# Patient Record
Sex: MALE | Race: WHITE | NOT HISPANIC OR LATINO | Employment: OTHER | URBAN - METROPOLITAN AREA
[De-identification: names, ages, dates, MRNs, and addresses within clinical notes are randomized per-mention and may not be internally consistent; named-entity substitution may affect disease eponyms.]

---

## 2020-10-02 ENCOUNTER — HOSPITAL PATHOLOGY (OUTPATIENT)
Dept: OTHER | Facility: CLINIC | Age: 80
End: 2020-10-02

## 2020-10-06 LAB — COPATH REPORT: NORMAL

## 2020-10-23 DIAGNOSIS — Z11.59 ENCOUNTER FOR SCREENING FOR OTHER VIRAL DISEASES: Primary | ICD-10-CM

## 2020-10-31 DIAGNOSIS — Z11.59 ENCOUNTER FOR SCREENING FOR OTHER VIRAL DISEASES: ICD-10-CM

## 2020-10-31 LAB
SARS-COV-2 RNA SPEC QL NAA+PROBE: NORMAL
SPECIMEN SOURCE: NORMAL

## 2020-10-31 PROCEDURE — U0003 INFECTIOUS AGENT DETECTION BY NUCLEIC ACID (DNA OR RNA); SEVERE ACUTE RESPIRATORY SYNDROME CORONAVIRUS 2 (SARS-COV-2) (CORONAVIRUS DISEASE [COVID-19]), AMPLIFIED PROBE TECHNIQUE, MAKING USE OF HIGH THROUGHPUT TECHNOLOGIES AS DESCRIBED BY CMS-2020-01-R: HCPCS | Performed by: OPHTHALMOLOGY

## 2020-11-01 LAB
LABORATORY COMMENT REPORT: NORMAL
SARS-COV-2 RNA SPEC QL NAA+PROBE: NEGATIVE
SPECIMEN SOURCE: NORMAL

## 2020-11-03 RX ORDER — LORATADINE 10 MG/1
10 TABLET ORAL DAILY PRN
COMMUNITY
End: 2022-07-08

## 2020-11-03 RX ORDER — LEVOTHYROXINE SODIUM 100 UG/1
100 TABLET ORAL DAILY
COMMUNITY

## 2020-11-03 RX ORDER — ALBUTEROL SULFATE 0.83 MG/ML
2.5 SOLUTION RESPIRATORY (INHALATION) EVERY 4 HOURS PRN
COMMUNITY
End: 2022-07-08

## 2020-11-03 RX ORDER — KETOCONAZOLE 10 MG/ML
SHAMPOO TOPICAL PRN
COMMUNITY

## 2020-11-03 RX ORDER — ASPIRIN 81 MG/1
81 TABLET, CHEWABLE ORAL DAILY
COMMUNITY
End: 2021-03-24

## 2020-11-03 RX ORDER — FEXOFENADINE HCL 180 MG/1
180 TABLET ORAL PRN
COMMUNITY
End: 2022-07-08

## 2020-11-03 RX ORDER — TRIAMCINOLONE ACETONIDE 1 MG/G
CREAM TOPICAL 2 TIMES DAILY
COMMUNITY
End: 2022-07-08

## 2020-11-03 RX ORDER — ALBUTEROL SULFATE 90 UG/1
2 AEROSOL, METERED RESPIRATORY (INHALATION) PRN
COMMUNITY

## 2020-11-03 RX ORDER — PANTOPRAZOLE SODIUM 40 MG/1
40 TABLET, DELAYED RELEASE ORAL DAILY
COMMUNITY
End: 2022-07-08

## 2020-11-04 ENCOUNTER — ANESTHESIA EVENT (OUTPATIENT)
Dept: SURGERY | Facility: CLINIC | Age: 80
End: 2020-11-04
Payer: MEDICARE

## 2020-11-04 ENCOUNTER — ANESTHESIA (OUTPATIENT)
Dept: SURGERY | Facility: CLINIC | Age: 80
End: 2020-11-04
Payer: MEDICARE

## 2020-11-04 ENCOUNTER — HOSPITAL ENCOUNTER (OUTPATIENT)
Facility: CLINIC | Age: 80
Discharge: HOME | End: 2020-11-04
Attending: OPHTHALMOLOGY | Admitting: OPHTHALMOLOGY
Payer: MEDICARE

## 2020-11-04 VITALS
HEART RATE: 56 BPM | TEMPERATURE: 98 F | DIASTOLIC BLOOD PRESSURE: 92 MMHG | RESPIRATION RATE: 16 BRPM | SYSTOLIC BLOOD PRESSURE: 135 MMHG | HEIGHT: 71 IN | BODY MASS INDEX: 23.49 KG/M2 | WEIGHT: 167.8 LBS | OXYGEN SATURATION: 95 %

## 2020-11-04 DIAGNOSIS — H02.9 LESION OF LEFT EYELID: Primary | ICD-10-CM

## 2020-11-04 PROCEDURE — 250N000011 HC RX IP 250 OP 636: Performed by: NURSE ANESTHETIST, CERTIFIED REGISTERED

## 2020-11-04 PROCEDURE — 360N000021 HC SURGERY LEVEL 3 EA 15 ADDTL MIN: Performed by: OPHTHALMOLOGY

## 2020-11-04 PROCEDURE — 88331 PATH CONSLTJ SURG 1 BLK 1SPC: CPT | Mod: 26 | Performed by: PATHOLOGY

## 2020-11-04 PROCEDURE — 258N000003 HC RX IP 258 OP 636: Performed by: NURSE ANESTHETIST, CERTIFIED REGISTERED

## 2020-11-04 PROCEDURE — 272N000001 HC OR GENERAL SUPPLY STERILE: Performed by: OPHTHALMOLOGY

## 2020-11-04 PROCEDURE — 360N000020 HC SURGERY LEVEL 3 1ST 30 MIN: Performed by: OPHTHALMOLOGY

## 2020-11-04 PROCEDURE — 999N000139 HC STATISTIC PRE-PROCEDURE ASSESSMENT II: Performed by: OPHTHALMOLOGY

## 2020-11-04 PROCEDURE — 250N000009 HC RX 250: Performed by: OPHTHALMOLOGY

## 2020-11-04 PROCEDURE — 88331 PATH CONSLTJ SURG 1 BLK 1SPC: CPT | Mod: TC | Performed by: OPHTHALMOLOGY

## 2020-11-04 PROCEDURE — 370N000001 HC ANESTHESIA TECHNICAL FEE, 1ST 30 MIN: Performed by: OPHTHALMOLOGY

## 2020-11-04 PROCEDURE — 761N000007 HC RECOVERY PHASE 2 EACH 15 MINS: Performed by: OPHTHALMOLOGY

## 2020-11-04 PROCEDURE — 761N000001 HC RECOVERY PHASE 1 LEVEL 1 FIRST HR: Performed by: OPHTHALMOLOGY

## 2020-11-04 PROCEDURE — 370N000002 HC ANESTHESIA TECHNICAL FEE, EACH ADDTL 15 MIN: Performed by: OPHTHALMOLOGY

## 2020-11-04 PROCEDURE — 88305 TISSUE EXAM BY PATHOLOGIST: CPT | Mod: TC | Performed by: OPHTHALMOLOGY

## 2020-11-04 PROCEDURE — 88305 TISSUE EXAM BY PATHOLOGIST: CPT | Mod: 26 | Performed by: PATHOLOGY

## 2020-11-04 RX ORDER — ONDANSETRON 2 MG/ML
4 INJECTION INTRAMUSCULAR; INTRAVENOUS EVERY 30 MIN PRN
Status: DISCONTINUED | OUTPATIENT
Start: 2020-11-04 | End: 2020-11-04 | Stop reason: HOSPADM

## 2020-11-04 RX ORDER — ERYTHROMYCIN 5 MG/G
OINTMENT OPHTHALMIC PRN
Status: DISCONTINUED | OUTPATIENT
Start: 2020-11-04 | End: 2020-11-04 | Stop reason: HOSPADM

## 2020-11-04 RX ORDER — HYDRALAZINE HYDROCHLORIDE 20 MG/ML
2.5-5 INJECTION INTRAMUSCULAR; INTRAVENOUS EVERY 10 MIN PRN
Status: DISCONTINUED | OUTPATIENT
Start: 2020-11-04 | End: 2020-11-04 | Stop reason: HOSPADM

## 2020-11-04 RX ORDER — DEXAMETHASONE SODIUM PHOSPHATE 4 MG/ML
4 INJECTION, SOLUTION INTRA-ARTICULAR; INTRALESIONAL; INTRAMUSCULAR; INTRAVENOUS; SOFT TISSUE EVERY 10 MIN PRN
Status: DISCONTINUED | OUTPATIENT
Start: 2020-11-04 | End: 2020-11-04 | Stop reason: HOSPADM

## 2020-11-04 RX ORDER — SODIUM CHLORIDE, SODIUM LACTATE, POTASSIUM CHLORIDE, CALCIUM CHLORIDE 600; 310; 30; 20 MG/100ML; MG/100ML; MG/100ML; MG/100ML
INJECTION, SOLUTION INTRAVENOUS CONTINUOUS PRN
Status: DISCONTINUED | OUTPATIENT
Start: 2020-11-04 | End: 2020-11-04

## 2020-11-04 RX ORDER — MEPERIDINE HYDROCHLORIDE 25 MG/ML
12.5 INJECTION INTRAMUSCULAR; INTRAVENOUS; SUBCUTANEOUS
Status: DISCONTINUED | OUTPATIENT
Start: 2020-11-04 | End: 2020-11-04 | Stop reason: HOSPADM

## 2020-11-04 RX ORDER — PROPOFOL 10 MG/ML
INJECTION, EMULSION INTRAVENOUS PRN
Status: DISCONTINUED | OUTPATIENT
Start: 2020-11-04 | End: 2020-11-04

## 2020-11-04 RX ORDER — FENTANYL CITRATE 0.05 MG/ML
25-50 INJECTION, SOLUTION INTRAMUSCULAR; INTRAVENOUS
Status: DISCONTINUED | OUTPATIENT
Start: 2020-11-04 | End: 2020-11-04 | Stop reason: HOSPADM

## 2020-11-04 RX ORDER — ONDANSETRON 4 MG/1
4 TABLET, ORALLY DISINTEGRATING ORAL EVERY 30 MIN PRN
Status: DISCONTINUED | OUTPATIENT
Start: 2020-11-04 | End: 2020-11-04 | Stop reason: HOSPADM

## 2020-11-04 RX ORDER — HYDROCODONE BITARTRATE AND ACETAMINOPHEN 5; 325 MG/1; MG/1
1 TABLET ORAL EVERY 6 HOURS PRN
Qty: 10 TABLET | Refills: 0 | Status: SHIPPED | OUTPATIENT
Start: 2020-11-04 | End: 2020-11-07

## 2020-11-04 RX ORDER — ERYTHROMYCIN 5 MG/G
OINTMENT OPHTHALMIC 3 TIMES DAILY
Qty: 2 TUBE | Refills: 1 | Status: SHIPPED | OUTPATIENT
Start: 2020-11-04 | End: 2020-11-04

## 2020-11-04 RX ORDER — SODIUM CHLORIDE, SODIUM LACTATE, POTASSIUM CHLORIDE, CALCIUM CHLORIDE 600; 310; 30; 20 MG/100ML; MG/100ML; MG/100ML; MG/100ML
INJECTION, SOLUTION INTRAVENOUS CONTINUOUS
Status: DISCONTINUED | OUTPATIENT
Start: 2020-11-04 | End: 2020-11-04 | Stop reason: HOSPADM

## 2020-11-04 RX ORDER — ONDANSETRON 2 MG/ML
INJECTION INTRAMUSCULAR; INTRAVENOUS PRN
Status: DISCONTINUED | OUTPATIENT
Start: 2020-11-04 | End: 2020-11-04

## 2020-11-04 RX ORDER — NALOXONE HYDROCHLORIDE 0.4 MG/ML
.1-.4 INJECTION, SOLUTION INTRAMUSCULAR; INTRAVENOUS; SUBCUTANEOUS
Status: DISCONTINUED | OUTPATIENT
Start: 2020-11-04 | End: 2020-11-04 | Stop reason: HOSPADM

## 2020-11-04 RX ORDER — ERYTHROMYCIN 5 MG/G
OINTMENT OPHTHALMIC 3 TIMES DAILY
Qty: 2 TUBE | Refills: 1 | Status: SHIPPED | OUTPATIENT
Start: 2020-11-04 | End: 2022-07-08

## 2020-11-04 RX ORDER — TETRACAINE HYDROCHLORIDE 5 MG/ML
SOLUTION OPHTHALMIC PRN
Status: DISCONTINUED | OUTPATIENT
Start: 2020-11-04 | End: 2020-11-04 | Stop reason: HOSPADM

## 2020-11-04 RX ORDER — LABETALOL HYDROCHLORIDE 5 MG/ML
10 INJECTION, SOLUTION INTRAVENOUS
Status: DISCONTINUED | OUTPATIENT
Start: 2020-11-04 | End: 2020-11-04 | Stop reason: HOSPADM

## 2020-11-04 RX ORDER — HYDROMORPHONE HYDROCHLORIDE 1 MG/ML
.3-.5 INJECTION, SOLUTION INTRAMUSCULAR; INTRAVENOUS; SUBCUTANEOUS EVERY 10 MIN PRN
Status: DISCONTINUED | OUTPATIENT
Start: 2020-11-04 | End: 2020-11-04 | Stop reason: HOSPADM

## 2020-11-04 RX ADMIN — PROPOFOL 20 MG: 10 INJECTION, EMULSION INTRAVENOUS at 14:51

## 2020-11-04 RX ADMIN — SODIUM CHLORIDE, POTASSIUM CHLORIDE, SODIUM LACTATE AND CALCIUM CHLORIDE: 600; 310; 30; 20 INJECTION, SOLUTION INTRAVENOUS at 14:23

## 2020-11-04 RX ADMIN — ONDANSETRON 4 MG: 2 INJECTION INTRAMUSCULAR; INTRAVENOUS at 14:46

## 2020-11-04 RX ADMIN — PROPOFOL 50 MG: 10 INJECTION, EMULSION INTRAVENOUS at 14:28

## 2020-11-04 RX ADMIN — PROPOFOL 20 MG: 10 INJECTION, EMULSION INTRAVENOUS at 14:29

## 2020-11-04 ASSESSMENT — COPD QUESTIONNAIRES: COPD: 1

## 2020-11-04 ASSESSMENT — MIFFLIN-ST. JEOR: SCORE: 1493.27

## 2020-11-04 ASSESSMENT — LIFESTYLE VARIABLES: TOBACCO_USE: 1

## 2020-11-04 NOTE — ANESTHESIA CARE TRANSFER NOTE
Patient: Ramu Quigley    Procedure(s):  LEFT LOWER LID WEDGE EXCISION WITH FROZEN SECTION CONTROL    Diagnosis: Neoplasm of uncertain behavior of skin [D48.5]  Diagnosis Additional Information: No value filed.    Anesthesia Type:   MAC     Note:  Airway :Room Air  Patient transferred to:PACU  Handoff Report: Identifed the Patient, Identified the Reponsible Provider, Reviewed the pertinent medical history, Discussed the surgical course, Reviewed Intra-OP anesthesia mangement and issues during anesthesia, Set expectations for post-procedure period and Allowed opportunity for questions and acknowledgement of understanding      Vitals: (Last set prior to Anesthesia Care Transfer)    CRNA VITALS  11/4/2020 1430 - 11/4/2020 1509      11/4/2020             Resp Rate (set):  10    EKG:  Sinus rhythm                Electronically Signed By: BRANDY Altman CRNA  November 4, 2020  3:09 PM

## 2020-11-04 NOTE — DISCHARGE INSTRUCTIONS
Same Day Surgery Discharge Instructions for  Sedation and General Anesthesia       It's not unusual to feel dizzy, light-headed or faint for up to 24 hours after surgery or while taking pain medication.  If you have these symptoms: sit for a few minutes before standing and have someone assist you when you get up to walk or use the bathroom.      You should rest and relax for the next 24 hours. We recommend you make arrangements to have an adult stay with you for at least 24 hours after your discharge.  Avoid hazardous and strenuous activity.      DO NOT DRIVE any vehicle or operate mechanical equipment for 24 hours following the end of your surgery.  Even though you may feel normal, your reactions may be affected by the medication you have received.      Do not drink alcoholic beverages for 24 hours following surgery.       Slowly progress to your regular diet as you feel able. It's not unusual to feel nauseated and/or vomit after receiving anesthesia.  If you develop these symptoms, drink clear liquids (apple juice, ginger ale, broth, 7-up, etc. ) until you feel better.  If your nausea and vomiting persists for 24 hours, please notify your surgeon.        All narcotic pain medications, along with inactivity and anesthesia, can cause constipation. Drinking plenty of liquids and increasing fiber intake will help.      For any questions of a medical nature, call your surgeon.      Do not make important decisions for 24 hours.      If you had general anesthesia, you may have a sore throat for a couple of days related to the breathing tube used during surgery.  You may use Cepacol lozenges to help with this discomfort.  If it worsens or if you develop a fever, contact your surgeon.       If you feel your pain is not well managed with the pain medications prescribed by your surgeon, please contact your surgeon's office to let them know so they can address your concerns.       CoVid 19 Information    We want to give you  information regarding Covid. Please consult your primary care provider with any questions you might have.     Patient who have symptoms (cough, fever, or shortness of breath), need to isolate for 7 days from when symptoms started OR 72 hours after fever resolves (without fever reducing medications) AND improvement of respiratory symptoms (whichever is longer).      Isolate yourself at home (in own room/own bathroom if possible)    Do Not allow any visitors    Do Not go to work or school    Do Not go to [employer],  centers, shopping, or other public places.    Do Not shake hands.    Avoid close and intimate contact with others (hugging, kissing).    Follow CDC recommendations for household cleaning of frequently touched services.     After the initial 7 days, continue to isolate yourself from household members as much as possible. To continue decrease the risk of community spread and exposure, you and any members of your household should limit activities in public for 14 days after starting home isolation.     You can reference the following CDC link for helpful home isolation/care tips:  https://www.cdc.gov/coronavirus/2019-ncov/downloads/10Things.pdf    Protect Others:    Cover Your Mouth and Nose with a mask, disposable tissue or wash cloth to avoid spreading germs to others.    Wash your hands and face frequently with soap and water    Call Your Primary Doctor If: Breathing difficulty develops or you become worse.    For more information about COVID19 and options for caring for yourself at home, please visit the CDC website at https://www.cdc.gov/coronavirus/2019-ncov/about/steps-when-sick.html  For more options for care at [facility], please visit our website at [email or web address]            [facility]   Eyelid/Orbital Surgery Discharge Instructions  Dr. Beulah Ba     ICE COMPRESSES  Immediately following surgery, you should begin to apply ice  compresses.  Apply a cold gel pack or wrap a clean washcloth around a cup of crushed ice in a plastic bag (a bag of frozen peas also works well) and hold the cold compresses directly against the closed eyelid (s).Apply cold pack for a minimum of six times daily for no longer than 15 minutes at a time. Continue cold compresses every day until the bruising and swelling begin to subside.  This can vary for each patient, but three days may be common.    HOT COMPRESSES  After your swelling and bruising have begun to subside, hot compresses should be applied.  Take a clean washcloth and wring it out in hot water (as warm as you can tolerate comfortably).  Hold this warm compress against the closed eyelid(s) at least six times per day for 15 minutes.  This should be continued for about two weeks.    OINTMENT  You may be given some ointment when you leave the hospital.  Apply this ointment as directed per pharmacy label for 7 days.  Expect some blurring of vision from the ointment.     ACTIVITY  Avoid heavy lifting or vigorous exercise for one week after surgery.  You may resume regular activities as tolerated.  You may shower and wash your hair on the day after surgery; be careful to avoid soaking the wounds or getting shampoo in your eyes. While your eyes are still swollen, it is recommended you sleep on your back and elevate your head with 2-3 pillows.    MEDICATION  If the doctor has given you some medications to take after surgery, please take these according to the instructions on the bottle.  Pain medications may make you drowsy so do not drive, operate heavy machinery, or use alcohol while taking it.  When you feel that you do not need the prescription pain medication, you may substitute Extra Strength Tylenol for mild pain by also following the directions on the bottle.    If you were taking Aspirin prior to your surgery, you may resume this medication tomorrow.    If you were on an anticoagulant, you may resume  taking it with the next scheduled dose.      WHAT TO EXPECT  You should expect some slight oozing of blood from the incision site over the first two to three days after surgery.  Swelling and bruising will occur for one to two weeks or longer.  You may also experience itching and tearing during the first several weeks after surgery.  This is part of the normal healing process    QUESTIONS  Please feel free to contact the office, should you have any questions that are not answered above.  The phone number is [phone].  Please call immediately if you are unable to establish vision in the operative eye, you are experiencing heavy bleeding that will not stop with gentle pressure or you have any signs of an infection (greenish/yellow discharge or progressive redness).    [facility]  [address]  [place], [place] [address] [phone]      **If you have questions or concerns about your procedure,  call Dr. Ba at [phone]**

## 2020-11-04 NOTE — ANESTHESIA POSTPROCEDURE EVALUATION
Patient: Ramu Quigley    Procedure(s):  LEFT LOWER LID WEDGE EXCISION WITH FROZEN SECTION CONTROL    Diagnosis:Neoplasm of uncertain behavior of skin [D48.5]  Diagnosis Additional Information: No value filed.    Anesthesia Type:  MAC    Note:  Anesthesia Post Evaluation    Patient location during evaluation: PACU  Patient participation: Able to fully participate in evaluation  Level of consciousness: awake and alert  Pain management: adequate  Airway patency: patent  Cardiovascular status: acceptable  Respiratory status: acceptable  Hydration status: acceptable  PONV: none     Anesthetic complications: None          Last vitals:  Vitals:    11/04/20 1530 11/04/20 1545 11/04/20 1556   BP: (!) 159/111 (!) 160/91 (!) 139/95   Pulse: 52 56 56   Resp: 12 10 11   Temp:      SpO2: 96% 94% 95%         Electronically Signed By: Boby Carey MD  November 4, 2020  4:34 PM

## 2020-11-04 NOTE — ANESTHESIA PREPROCEDURE EVALUATION
Anesthesia Pre-Procedure Evaluation    Patient: Ramu Quigley   MRN: [id number] : 1940          Preoperative Diagnosis: Neoplasm of uncertain behavior of skin [D48.5]    Procedure(s):  LEFT LOWER LID WEDGE EXCISION WITH FROZEN SECTION CONTROL    Past Medical History:   Diagnosis Date     Asthma      COPD (chronic obstructive pulmonary disease) (H)      Essential thrombocytopenia (H)      Graves disease      Hypertension      Hypothyroidism      Lumbar disc herniation      Testosterone deficiency      Past Surgical History:   Procedure Laterality Date     BACK SURGERY      lumbar discectomy     ENT SURGERY      sinus polypectomy       Anesthesia Evaluation     . Pt has had prior anesthetic.     No history of anesthetic complications          ROS/MED HX    ENT/Pulmonary:     (+)tobacco use, Past use asthma COPD, , . .   (-) sleep apnea   Neurologic:  - neg neurologic ROS     Cardiovascular:     (+) hypertension----. : . . . :. .       METS/Exercise Tolerance:     Hematologic:     (+) Other Hematologic Disorder-thrombocytopenia      Musculoskeletal:         GI/Hepatic:     (+) GERD       Renal/Genitourinary:  - ROS Renal section negative       Endo:     (+) thyroid problem hypothyroidism, .      Psychiatric:         Infectious Disease:         Malignancy:   (+) Malignancy History of Skin          Other:                          Physical Exam  Normal systems: cardiovascular and pulmonary    Airway   Mallampati: I  TM distance: >3 FB  Neck ROM: full    Dental   (+) caps    Cardiovascular       Pulmonary             No results found for: WBC, HGB, HCT, PLT, CRP, SED, NA, POTASSIUM, CHLORIDE, CO2, BUN, CR, GLC, JOSUE, PHOS, MAG, ALBUMIN, PROTTOTAL, ALT, AST, GGT, ALKPHOS, BILITOTAL, BILIDIRECT, LIPASE, AMYLASE, SIMON, PTT, INR, FIBR, TSH, T4, T3, HCG, HCGS, CKTOTAL, CKMB, TROPN    Preop Vitals  BP Readings from Last 3 Encounters:   No data found for BP    Pulse Readings from Last 3 Encounters:   No data found  for Pulse      Resp Readings from Last 3 Encounters:   No data found for Resp    SpO2 Readings from Last 3 Encounters:   No data found for SpO2      Temp Readings from Last 1 Encounters:   01/04/10 36.4  C (97.5  F) (Tympanic)    Ht Readings from Last 1 Encounters:   No data found for Ht      Wt Readings from Last 1 Encounters:   No data found for Wt    There is no height or weight on file to calculate BMI.       Anesthesia Plan      History & Physical Review  History and physical reviewed and following examination; no interval change.    ASA Status:  2 .    NPO Status:  > 8 hours    Plan for MAC Reason for MAC:  Deep or markedly invasive procedure (G8)  PONV prophylaxis:  Ondansetron (or other 5HT-3)         Postoperative Care  Postoperative pain management:  Multi-modal analgesia.      Consents  Anesthetic plan, risks, benefits and alternatives discussed with:  Patient..                 Negrita Retana MD

## 2020-11-04 NOTE — OP NOTE
"Pre operative Diagnosis:  1. Left lower eyelid margin involving lesion 2 mm x  3 mm, history of biopsy proven basal cell carcinoma      Post-operative Diagnosis: Same as above      Procedure(s):    1.Left lower eyelid wedge resection with frozen section control and with full thickness repair                 Surgeon: Beulah Ba MD      Anesthesia: Monitored anesthesia care with local anesthetic      Blood loss: <5 cc      Complications: None      Specimens: Left lower eyelid wedge resection (permanent pathology), medial and lateral margin for frozen section returned negative for malignancy                 Operative Procedure:  The risks, benefits and alternatives to the procedure were discussed with the patient.The patient agreed to the planned procedure and the patient was brought to the operating room.  A \"time out\" was performed to ensure the correct surgical site was being operated on.  The eyelid skin was cleaned with isopropyl alcohol.  A pentagonal wedge was marked with care taken to excise the entire lesion. The lesion was noted to be 2 x 3 mm, the wedge was increased by 1 mm on each side.  Local anesthetic was injected into the operative site(s). The patient was prepped and draped in the usual sterile fashion.     Next, attention was turned to left lower eyelid.  A wedge was marked with care taken to excise the gross appearing lesion with 1 mm margin on each side. The eye was protected with the Aziza plate and the #15 blade was used to make full thickness incision through the eyelid margin along the previously placed markings.  The wedge was completed with scissors.  A medial and lateral margin (~1 mm on each side) were also excised and sent to pathology for frozen section.  Cautery was used for hemostasis. This wedge resection was sent for permanent pathology.  The frozen sections returned negative for carcinoma. The repair was performed with partial thickness posterior lamellar passes of 5-0 " vicryl sutures in an interrupted fashion. The lid margin was repaired with 7-0 vicryl in a vertical mattress fashion: one through the gray line and one through the lash line.  Care was taken to laura the lid margin.    Hemostasis was achieved with cautery.      The anterior lamella was then repair with interrupted 7-0 vicryl sutures.  The face was washed and antibiotic ointment was placed.     The patient was transferred to recovery in stable condition.           Beulah Ba MD

## 2020-11-05 LAB — COPATH REPORT: NORMAL

## 2021-03-03 ENCOUNTER — HOSPITAL ENCOUNTER (OUTPATIENT)
Dept: WOUND CARE | Facility: CLINIC | Age: 81
Discharge: HOME | End: 2021-03-03
Attending: SURGERY | Admitting: SURGERY
Payer: MEDICARE

## 2021-03-03 VITALS
TEMPERATURE: 99.8 F | DIASTOLIC BLOOD PRESSURE: 83 MMHG | WEIGHT: 171.6 LBS | HEART RATE: 73 BPM | BODY MASS INDEX: 24.02 KG/M2 | SYSTOLIC BLOOD PRESSURE: 159 MMHG | HEIGHT: 71 IN

## 2021-03-03 DIAGNOSIS — I70.233 ATHEROSCLEROSIS OF NATIVE ARTERIES OF RIGHT LEG WITH ULCERATION OF ANKLE (H): ICD-10-CM

## 2021-03-03 DIAGNOSIS — L97.912 ULCER OF RIGHT LOWER EXTREMITY WITH FAT LAYER EXPOSED (H): Primary | ICD-10-CM

## 2021-03-03 DIAGNOSIS — L97.912 ULCER OF RIGHT LOWER EXTREMITY WITH FAT LAYER EXPOSED (H): ICD-10-CM

## 2021-03-03 PROBLEM — G47.61 PERIODIC LIMB MOVEMENT DISORDER: Status: ACTIVE | Noted: 2017-10-31

## 2021-03-03 PROBLEM — R60.0 EDEMA OF LOWER EXTREMITY: Status: ACTIVE | Noted: 2019-04-23

## 2021-03-03 PROBLEM — D47.3 ESSENTIAL THROMBOCYTHEMIA (H): Status: ACTIVE | Noted: 2019-04-23

## 2021-03-03 PROBLEM — Z87.891 PERSONAL HISTORY OF NICOTINE DEPENDENCE: Status: ACTIVE | Noted: 2018-11-28

## 2021-03-03 PROBLEM — K92.1 GASTROINTESTINAL HEMORRHAGE WITH MELENA: Status: ACTIVE | Noted: 2019-12-18

## 2021-03-03 PROBLEM — M81.0 OSTEOPOROSIS: Status: ACTIVE | Noted: 2017-10-31

## 2021-03-03 PROBLEM — Z86.39 HISTORY OF GRAVES' DISEASE: Status: ACTIVE | Noted: 2021-03-03

## 2021-03-03 PROBLEM — H91.90 HIGH FREQUENCY DEAFNESS: Status: ACTIVE | Noted: 2017-10-30

## 2021-03-03 PROBLEM — L97.911: Status: ACTIVE | Noted: 2019-12-18

## 2021-03-03 PROBLEM — E03.9 HYPOTHYROIDISM (ACQUIRED): Status: ACTIVE | Noted: 2019-12-18

## 2021-03-03 PROBLEM — E34.9 TESTOSTERONE DEFICIENCY: Status: ACTIVE | Noted: 2021-03-03

## 2021-03-03 PROBLEM — G47.33 OBSTRUCTIVE SLEEP APNEA SYNDROME: Status: ACTIVE | Noted: 2017-10-31

## 2021-03-03 PROBLEM — R41.3 MEMORY IMPAIRMENT: Status: ACTIVE | Noted: 2017-08-11

## 2021-03-03 PROBLEM — K92.2 GASTROINTESTINAL HEMORRHAGE: Status: ACTIVE | Noted: 2020-01-29

## 2021-03-03 PROBLEM — J45.909 ASTHMA: Status: ACTIVE | Noted: 2021-03-03

## 2021-03-03 PROBLEM — D64.9 ANEMIA: Status: ACTIVE | Noted: 2018-11-20

## 2021-03-03 PROBLEM — L29.9 PRURITUS: Status: ACTIVE | Noted: 2020-10-12

## 2021-03-03 PROCEDURE — 17250 CHEM CAUT OF GRANLTJ TISSUE: CPT | Performed by: SURGERY

## 2021-03-03 PROCEDURE — 81291 MTHFR GENE: CPT | Performed by: INTERNAL MEDICINE

## 2021-03-03 PROCEDURE — 88312 SPECIAL STAINS GROUP 1: CPT | Mod: TC | Performed by: SURGERY

## 2021-03-03 PROCEDURE — 88305 TISSUE EXAM BY PATHOLOGIST: CPT | Mod: 26 | Performed by: DERMATOLOGY

## 2021-03-03 PROCEDURE — 11042 DBRDMT SUBQ TIS 1ST 20SQCM/<: CPT | Performed by: SURGERY

## 2021-03-03 PROCEDURE — G0463 HOSPITAL OUTPT CLINIC VISIT: HCPCS | Mod: 25

## 2021-03-03 PROCEDURE — 99203 OFFICE O/P NEW LOW 30 MIN: CPT | Mod: 25 | Performed by: SURGERY

## 2021-03-03 PROCEDURE — G0452 MOLECULAR PATHOLOGY INTERPR: HCPCS | Performed by: PATHOLOGY

## 2021-03-03 PROCEDURE — 11102 TANGNTL BX SKIN SINGLE LES: CPT | Mod: XU

## 2021-03-03 PROCEDURE — 11102 TANGNTL BX SKIN SINGLE LES: CPT | Performed by: SURGERY

## 2021-03-03 PROCEDURE — 36415 COLL VENOUS BLD VENIPUNCTURE: CPT | Performed by: INTERNAL MEDICINE

## 2021-03-03 PROCEDURE — 88305 TISSUE EXAM BY PATHOLOGIST: CPT | Mod: TC | Performed by: SURGERY

## 2021-03-03 PROCEDURE — 88312 SPECIAL STAINS GROUP 1: CPT | Mod: 26 | Performed by: DERMATOLOGY

## 2021-03-03 RX ORDER — LOSARTAN POTASSIUM 100 MG/1
100 TABLET ORAL DAILY
COMMUNITY
Start: 2020-11-19

## 2021-03-03 RX ORDER — IRON HEME POLYPEPTIDE/FOLIC AC 12-1MG
10000 TABLET ORAL DAILY
Qty: 90 CAPSULE | Refills: 3 | Status: SHIPPED | OUTPATIENT
Start: 2021-03-03

## 2021-03-03 RX ORDER — LANOLIN ALCOHOL/MO/W.PET/CERES
1000 CREAM (GRAM) TOPICAL DAILY
Qty: 90 TABLET | Refills: 3 | Status: SHIPPED | OUTPATIENT
Start: 2021-03-03 | End: 2022-07-08

## 2021-03-03 RX ORDER — MULTIPLE VITAMINS W/ MINERALS TAB 9MG-400MCG
1 TAB ORAL
COMMUNITY

## 2021-03-03 RX ORDER — MULTIVIT WITH MINERALS/LUTEIN
1000 TABLET ORAL 2 TIMES DAILY
Qty: 180 TABLET | Refills: 3 | Status: SHIPPED | OUTPATIENT
Start: 2021-03-03

## 2021-03-03 ASSESSMENT — MIFFLIN-ST. JEOR: SCORE: 1510.5

## 2021-03-03 NOTE — PROGRESS NOTES
Patient arrived for wound care visit. Certified Wound Care Nurse time spent evaluating patient record, completed a full evaluation and documented wound(s) & doroteo-wound skin; provided recommendation based on treatment plan. Applied dressing, reviewed discharge instructions, patient education, and discussed plan of care with appropriate medical team staff members and patient and/or family members.

## 2021-03-03 NOTE — PROGRESS NOTES
[facility] [place] Progress Note    Subject: Ramu Quigley consultation for evaluation of right lateral distal calf wound.  Patient is an 80-year-old male present with his wife, he states he has been seen at [facility] over the past 2 years in the wound clinic, he states this was traumatically induced secondary to a penetrating injury while fixing a lawnmower.  Medihoney has been utilized, Jobst style compression sock or Sigvaris compression sock.  Denies history of deep venous thrombosis, nondiabetic, remote history of tobacco utilization, he has not had ankle-brachial indices or duplex ultrasound for evaluation of peripheral arterial disease.  Denies history of stroke.  No tobacco utilization current, no significant alcohol utilization.  States he has COPD, not on home oxygen.  History of hypertension and hypothyroidism.  No history of myocardial infarction, renal or hepatic dysfunction, no known history of lymphedema though states he has had chronic edema in the right lower extremity since the trauma approximately 2 years ago.  Medical record reviewed.  In view of systems otherwise negative.    PMH:   Past Medical History:   Diagnosis Date     Asthma      COPD (chronic obstructive pulmonary disease) (H)      Essential thrombocytopenia (H)      Graves disease      Hypertension      Hypothyroidism      Lumbar disc herniation      Testosterone deficiency      Patient Active Problem List   Diagnosis     Anemia     Asthma     Chronic cough     Edema of lower extremity     Essential hypertension     Essential thrombocytosis (H)     Essential thrombocythemia (H)     Gastrointestinal hemorrhage     Gastrointestinal hemorrhage with melena     High frequency deafness     History of Graves' disease     Hypothyroidism (acquired)     Hypothyroidism following radioiodine therapy     Memory impairment     Moderate COPD (chronic obstructive pulmonary disease) (H)     Obstructive sleep apnea syndrome      Osteoporosis     Periodic limb movement disorder     Personal history of nicotine dependence     Personal history of other malignant neoplasm of skin     Pruritus     Testosterone deficiency     Non-pressure chronic ulcer of unspecified part of right lower leg limited to breakdown of skin (H)     Ulcer of right lower extremity with fat layer exposed (H)     Social Hx:   Social History     Socioeconomic History     Marital status:      Spouse name: Not on file     Number of children: Not on file     Years of education: Not on file     Highest education level: Not on file   Occupational History     Not on file   Social Needs     Financial resource strain: Not on file     Food insecurity     Worry: Not on file     Inability: Not on file     Transportation needs     Medical: Not on file     Non-medical: Not on file   Tobacco Use     Smoking status: Former Smoker     Smokeless tobacco: Never Used     Tobacco comment: quit over 40 yrs ago   Substance and Sexual Activity     Alcohol use: Yes     Comment: 3 drinks/week     Drug use: Never     Sexual activity: Not on file   Lifestyle     Physical activity     Days per week: Not on file     Minutes per session: Not on file     Stress: Not on file   Relationships     Social connections     Talks on phone: Not on file     Gets together: Not on file     Attends [employer]ism service: Not on file     Active member of club or organization: Not on file     Attends meetings of clubs or organizations: Not on file     Relationship status: Not on file     Intimate partner violence     Fear of current or ex partner: Not on file     Emotionally abused: Not on file     Physically abused: Not on file     Forced sexual activity: Not on file   Other Topics Concern     Parent/sibling w/ CABG, MI or angioplasty before 65F 55M? Not Asked   Social History Narrative     Not on file       Surgical Hx:   Past Surgical History:   Procedure Laterality Date     BACK SURGERY      lumbar discectomy      ENT SURGERY      sinus polypectomy     WEDGE RESECTION EYELID Left 11/4/2020    Procedure: LEFT LOWER LID WEDGE EXCISION WITH FROZEN SECTION CONTROL;  Surgeon: Beulah Ba MD;  Location:  OR       Allergies:    Allergies   Allergen Reactions     Cats Itching     Dust Mites Itching     Mold Itching     Levaquin [Levofloxacin]      Ankle swelling       Medications:   Current Outpatient Medications   Medication     albuterol (PROAIR HFA/PROVENTIL HFA/VENTOLIN HFA) 108 (90 Base) MCG/ACT inhaler     albuterol (PROVENTIL) (2.5 MG/3ML) 0.083% neb solution     busulfan (MYLERAN) 2 MG tablet     Calcium Carbonate-Vitamin D (CALTRATE 600+D PO)     calcium carbonate-vitamin D (OYSTER SHELL CALCIUM/D) 500-200 MG-UNIT tablet     cholecalciferol (DIALYVITE VITAMIN D 5000) 125 MCG (5000 UT) CAPS     cyanocobalamin (VITAMIN B-12) 1000 MCG tablet     erythromycin (ROMYCIN) 5 MG/GM ophthalmic ointment     fexofenadine (ALLEGRA) 180 MG tablet     fluticasone-salmeterol (ADVAIR) 250-50 MCG/DOSE inhaler     folic acid (FOLVITE) 400 MCG tablet     ketoconazole (NIZORAL A-D) 1 % shampoo     levothyroxine (SYNTHROID/LEVOTHROID) 100 MCG tablet     loratadine (CLARITIN) 10 MG tablet     losartan (COZAAR) 25 MG tablet     Multiple Vitamins-Minerals (MULTIVITAMIN ADULTS PO)     multivitamin w/minerals (MULTI-VITAMIN) tablet     omeprazole (PRILOSEC) 20 MG DR capsule     pantoprazole (PROTONIX) 40 MG EC tablet     triamcinolone (KENALOG) 0.1 % external cream     umeclidinium (INCRUSE ELLIPTA) 62.5 MCG/INH inhaler     vitamin C (ASCORBIC ACID) 1000 MG TABS     aspirin (ASA) 81 MG chewable tablet     No current facility-administered medications for this encounter.        Labs: No results for input(s): ALBUMIN, HGB, INR, WBC, A1C, CRP in the last 57445 hours.    Invalid input(s): PREALBUMIN,  GLUCOSE, MICROBIO  No results found for: CR  No results found for: GFRESTIMATED  No results found for: GFRESTBLACK  No results found for:  "WBC  No results found for: RBC  No results found for: HGB  No results found for: HCT  No components found for: MCT  No results found for: MCV  No results found for: MCH  No results found for: MCHC  No results found for: RDW  No results found for: PLT       Nutrition requirements were discussed with patient today.  Objective:  BP (!) 159/83   Pulse 73   Temp 99.8  F (37.7  C) (Temporal)   Ht 1.803 m (5' 11\")   Wt 77.8 kg (171 lb 9.6 oz)   BMI 23.93 kg/m    Wound (used by OP [facility] only) 03/03/21 1237 Right (Active)   Dressing Appearance moist drainage 03/03/21 1200   Length (cm) 4 03/03/21 1200   Width (cm) 1.6 03/03/21 1200   Depth (cm) 0.3 03/03/21 1200   Wound (cm^2) 6.4 cm^2 03/03/21 1200   Wound Volume (cm^3) 1.92 cm^3 03/03/21 1200   Drainage Characteristics/Odor serosanguineous 03/03/21 1200   Drainage Amount moderate 03/03/21 1200        General:  Patient is alert and orientated, no acute distress.  Conversant.  Evaluation of the right lateral distal calf reveals gravity dependent maceration from wound drainage, significant 2+ pitting interstitial edema, pale granulation bed, no cellulitis, no bone or tendon exposure, no undermining, no foul odor, no clinical signs or symptoms of infection.  Periwound margins have mild raised edges.    Vascular: Palpable posterior tibial pulse though no palpable anterior tibial pulse on the right lower extremity    Procedure:   Patient was determined to be capable of making their own medical decisions and informed consent was obtained, topical anesthetic of 4% lidocaine was applied, then 1% lidocaine was infiltrated, knife was utilized to excise all wound edges and wound bed, sent to dermatopathology for evaluation, debridement was performed.  Knife was used to debride ulcer down to and including subcutaneous tissue. Bleeding controlled with light pressure and application of silver nitrate.. Patient tolerated procedure well.    Impression: 2-year history of chronic " nonhealing right lateral distal calf wound initiated by trauma, penetrating injury while repairing lawnmower, previous utilization of Medihoney, history of tobacco utilization ceased approximately 30 years ago  Barriers to healing include: Tobacco, lymphedema, chronic edema and chronicity    Plan: Obtain ankle-brachial index, toe brachial indices, duplex ultrasound right lower extremity to evaluate for potential peripheral arterial disease given nonpalpable right pedal pulse and history of tobacco utilization.  Control of interstitial edema with utilization of edema wear, then place hypochlorous acid over edema wear on top of wound, changes twice a day, Tubigrip sock to be placed over edema wear for mild compression on top of the edema wear, obtain MTHFR given chronicity of wound.  Adjunct of micronutrients of micronized purified flavonoid fraction 1 tablet twice daily, B12 1 mg daily, folate 1 mg daily, vitamin C 2000 mg daily, vitamin D 10,000 international units daily.  Emphasized the need to elevate right ankle above hip when sitting to decrease interstitial edema.  Patient will return to the clinic in 2-3 weeks time.     Juancho Guillen MD on 3/3/2021 at 1:59 PM      [place]

## 2021-03-03 NOTE — DISCHARGE INSTRUCTIONS
[facility]  [address] [address]    Call us at [phone] if you have any questions about your wounds, have redness or swelling around your wound, have a fever of 101 or greater or if you have any other problems or concerns. We answer the phone Monday through Friday 8 am to 4 pm, please leave a message as we check the voicemail frequently throughout the day.     Ramu Quigley      1940    Medications/supplements to aid in healin. Hesperidin Diosmin 1,000 mg tablet twice daily order from [employer]   2. Vitamin D3 10,000 iu per day  3. Vitamin C 2,000 mg daily  4. Methyl Folate 1000 mcg daily   5. Vitamin B 12 1000 mcg daily    Wound care recommendations to Right Ankle  Cleanse leg with soap and water in shower. Pat Dry.  Apply Edemawear blue stripe from base of toes to knee.   Apply VASHE damp gauze on top. Secure with roll gauze and tape. Change twice a day  Compression:  Apply clean compression spandigrip E first thing in the morning and remove at night before going to bed.   Remove compression dressing if toes turn blue and/or start to feel numb and is not relieved by elevating the leg for one hour.   Walk as much as you can. When you sit raise your ankle above your hips to promote wound healing.      RICHARD Guillen M.D.. March 3, 2021    Wound Clinic follow up 3 weeks   [facility] [phone] for blood flow testing    COVID vaccine information at [email or web address]    If you had a positive experience please indicate that on your patient satisfaction survey form that [facility] will be sending you.

## 2021-03-08 LAB — COPATH REPORT: NORMAL

## 2021-03-10 LAB — COPATH REPORT: NORMAL

## 2021-03-15 ENCOUNTER — HOSPITAL ENCOUNTER (OUTPATIENT)
Dept: ULTRASOUND IMAGING | Facility: CLINIC | Age: 81
End: 2021-03-15
Attending: SURGERY
Payer: MEDICARE

## 2021-03-15 DIAGNOSIS — L97.912 ULCER OF RIGHT LOWER EXTREMITY WITH FAT LAYER EXPOSED (H): ICD-10-CM

## 2021-03-15 DIAGNOSIS — I70.233 ATHEROSCLEROSIS OF NATIVE ARTERIES OF RIGHT LEG WITH ULCERATION OF ANKLE (H): ICD-10-CM

## 2021-03-15 PROCEDURE — 93926 LOWER EXTREMITY STUDY: CPT | Mod: RT

## 2021-03-15 PROCEDURE — 93922 UPR/L XTREMITY ART 2 LEVELS: CPT

## 2021-03-24 ENCOUNTER — HOSPITAL ENCOUNTER (OUTPATIENT)
Dept: WOUND CARE | Facility: CLINIC | Age: 81
End: 2021-03-24
Attending: SURGERY
Payer: MEDICARE

## 2021-03-24 VITALS
HEART RATE: 75 BPM | DIASTOLIC BLOOD PRESSURE: 90 MMHG | TEMPERATURE: 98.6 F | SYSTOLIC BLOOD PRESSURE: 157 MMHG | RESPIRATION RATE: 16 BRPM

## 2021-03-24 DIAGNOSIS — L97.912 ULCER OF RIGHT LOWER EXTREMITY WITH FAT LAYER EXPOSED (H): ICD-10-CM

## 2021-03-24 DIAGNOSIS — I87.2 VENOUS (PERIPHERAL) INSUFFICIENCY: Primary | ICD-10-CM

## 2021-03-24 PROCEDURE — 11042 DBRDMT SUBQ TIS 1ST 20SQCM/<: CPT | Performed by: SURGERY

## 2021-03-24 NOTE — PROGRESS NOTES
[facility]aling [place] Progress Note    Subject: Ramu Quigley traumatically induced right lateral calf ulceration, dermatopathology demonstrates no evidence of malignancy, additional studies reviewed, adequate arterial perfusion.  Less maceration.  Denies pain.  Utilizing edema wear.  Needs improved interstitial edema control, will measure for Sigvaris CoolFlex.    Patient Active Problem List   Diagnosis     Anemia     Asthma     Chronic cough     Edema of lower extremity     Essential hypertension     Essential thrombocytosis (H)     Essential thrombocythemia (H)     Gastrointestinal hemorrhage     Gastrointestinal hemorrhage with melena     High frequency deafness     History of Graves' disease     Hypothyroidism (acquired)     Hypothyroidism following radioiodine therapy     Memory impairment     Moderate COPD (chronic obstructive pulmonary disease) (H)     Obstructive sleep apnea syndrome     Osteoporosis     Periodic limb movement disorder     Personal history of nicotine dependence     Personal history of other malignant neoplasm of skin     Pruritus     Testosterone deficiency     Non-pressure chronic ulcer of unspecified part of right lower leg limited to breakdown of skin (H)     Ulcer of right lower extremity with fat layer exposed (H)     Past Medical History:   Diagnosis Date     Asthma      COPD (chronic obstructive pulmonary disease) (H)      Essential thrombocytopenia (H)      Graves disease      Hypertension      Hypothyroidism      Lumbar disc herniation      Testosterone deficiency      Exam:  BP (!) 157/90   Pulse 75   Temp 98.6  F (37  C) (Temporal)   Resp 16   Wound (used by OP [facility] only) 03/03/21 1237 Right (Active)   Thickness/Stage full thickness 03/24/21 1210   Dressing Appearance moist drainage 03/24/21 1210   Base granulating 03/24/21 1210   Periwound swelling 03/24/21 1210   Periwound Temperature warm 03/24/21 1210   Periwound Skin Turgor soft 03/24/21 1210   Edges  open 03/24/21 1210   Length (cm) 4.7 03/24/21 1210   Width (cm) 2.6 03/24/21 1210   Depth (cm) 0.3 03/24/21 1210   Wound (cm^2) 12.22 cm^2 03/24/21 1210   Wound Volume (cm^3) 3.67 cm^3 03/24/21 1210   Wound healing % -90.94 03/24/21 1210   Drainage Characteristics/Odor serosanguineous 03/24/21 1210   Drainage Amount moderate 03/24/21 1210   Care, Wound non-select wound debridement performed 03/24/21 1210     Measurements were Sigvaris CoolFlex right leg, calf length 42 cm, foot length 20 cm, circumference of calf 36 cm, ankle 27 cm, ankle to heel 37 cm, base of toes 25 cm.  Wound has fibrinous debris, no eschar, no purulence, no undermining, no bone or tendon exposure, no periwound erythema, epiboly improved.    Procedure:   Patient was determined to be capable of making their own medical decisions and informed consent was obtained. Topical anesthetic of 4% lidocaine was applied, debridement was performed using a #15 blade down to and including subcutaneous tissue biofilm, bleeding controlled with light pressure. Patient tolerated procedure well.    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  Anticipate this should be 40 to 50% better within the next 4 weeks.  Wound size had increased due to shave biopsy performed at consultation..  Patient will return to the clinic in 4 weeks time    Juancho Guillen MD on 3/24/2021 at 12:18 PM

## 2021-03-24 NOTE — DISCHARGE INSTRUCTIONS
[facility]  [address] [address]    Call us at [phone] if you have any questions about your wounds, have redness or swelling around your wound, have a fever of 101 or greater or if you have any other problems or concerns. We answer the phone Monday through Friday 8 am to 4 pm, please leave a message as we check the voicemail frequently throughout the day.     Ramu Quigley      1940    Medications/supplements to aid in healing:  Hesperidin Diosmin 1,000 mg tablet twice daily order from [employer]  Vitamin D3 10,000 iu per day  Vitamin C 2,000 mg daily  Methyl Folate 1000 mcg daily  Vitamin B 12 1000 mcg daily  Wound care recommendations to Right Ankle  Cleanse leg with soap and water in shower. Pat Dry.  Apply Medline Gel to wound base Edemawear blue stripe from base of toes to knee.  Apply VASHE damp gauze on top. Secure with roll gauze and tape. Change twice a day  Compression: Apply clean Coolflex Wraps -apply by pulling the strap to the second white line than hooking into place- leave on 24/7  Remove compression dressing if toes turn blue and/or start to feel numb and is not relieved by elevating the leg for one hour.  Walk as much as you can. When you sit raise your ankle above your hips to promote wound healing       RICHARD Guillen M.D.. March 24, 2021          If you had a positive experience please indicate on your patient satisfaction survey form that [facility] will be sending you.

## 2021-04-22 ENCOUNTER — HOSPITAL ENCOUNTER (OUTPATIENT)
Dept: WOUND CARE | Facility: CLINIC | Age: 81
Discharge: HOME | End: 2021-04-22
Attending: SURGERY | Admitting: SURGERY
Payer: MEDICARE

## 2021-04-22 VITALS
HEART RATE: 74 BPM | TEMPERATURE: 98.5 F | RESPIRATION RATE: 16 BRPM | SYSTOLIC BLOOD PRESSURE: 157 MMHG | DIASTOLIC BLOOD PRESSURE: 89 MMHG

## 2021-04-22 DIAGNOSIS — L97.912 ULCER OF RIGHT LOWER EXTREMITY WITH FAT LAYER EXPOSED (H): ICD-10-CM

## 2021-04-22 DIAGNOSIS — L97.922 ULCER OF LEFT LOWER EXTREMITY WITH FAT LAYER EXPOSED (H): Primary | ICD-10-CM

## 2021-04-22 PROCEDURE — 97602 WOUND(S) CARE NON-SELECTIVE: CPT

## 2021-04-22 PROCEDURE — 99213 OFFICE O/P EST LOW 20 MIN: CPT | Performed by: SURGERY

## 2021-04-22 RX ORDER — BETAMETHASONE DIPROPIONATE 0.05 %
OINTMENT (GRAM) TOPICAL DAILY
Qty: 150 G | Refills: 1 | Status: SHIPPED | OUTPATIENT
Start: 2021-04-22 | End: 2022-07-08

## 2021-04-22 NOTE — PROGRESS NOTES
[facility]ound [facility] [place] Progress Note    Subject: Ramu Quigley, traumatically induced ulceration right lateral distal calf with chronic edema, 2+ pitting at the right medial malleolus and the right lateral malleolus.  The patient lacks insight into the degree of edema and the necessity of treating edema to maximize microvascular perfusion of the lower extremity ulceration and management.  He states he attempted to obtain the overlying inelastic compression and that it has not been ordered though he questions the need for any type of additional inelastic compression over the edema wear.  The physiology has been discussed with the patient at length with multiple types of analogies.    Patient Active Problem List   Diagnosis     Anemia     Asthma     Chronic cough     Edema of lower extremity     Essential hypertension     Essential thrombocytosis (H)     Essential thrombocythemia (H)     Gastrointestinal hemorrhage     Gastrointestinal hemorrhage with melena     High frequency deafness     History of Graves' disease     Hypothyroidism (acquired)     Hypothyroidism following radioiodine therapy     Memory impairment     Moderate COPD (chronic obstructive pulmonary disease) (H)     Obstructive sleep apnea syndrome     Osteoporosis     Periodic limb movement disorder     Personal history of nicotine dependence     Personal history of other malignant neoplasm of skin     Pruritus     Testosterone deficiency     Non-pressure chronic ulcer of unspecified part of right lower leg limited to breakdown of skin (H)     Ulcer of right lower extremity with fat layer exposed (H)     Past Medical History:   Diagnosis Date     Asthma      COPD (chronic obstructive pulmonary disease) (H)      Essential thrombocytopenia (H)      Graves disease      Hypertension      Hypothyroidism      Lumbar disc herniation      Testosterone deficiency      Exam:  BP (!) 157/89   Pulse 74   Temp 98.5  F (36.9  C) (Temporal)   Resp  16   Wound (used by OP [facility] only) 03/03/21 1237 Right (Active)   Thickness/Stage full thickness 04/22/21 1104   Dressing Appearance moist drainage 04/22/21 1104   Base granulating 04/22/21 1104   Periwound intact 04/22/21 1104   Periwound Temperature warm 04/22/21 1104   Periwound Skin Turgor soft 04/22/21 1104   Edges open 04/22/21 1104   Length (cm) 4.2 04/22/21 1104   Width (cm) 2.6 04/22/21 1104   Depth (cm) 0.2 04/22/21 1104   Wound (cm^2) 10.92 cm^2 04/22/21 1104   Wound Volume (cm^3) 2.18 cm^3 04/22/21 1104   Wound healing % -70.62 04/22/21 1104   Drainage Characteristics/Odor serosanguineous 04/22/21 1104   Drainage Amount moderate 04/22/21 1104   Care, Wound non-select wound debridement performed 04/22/21 1104     Chronic pitting edema right lateral and medial malleolus, chronic ulceration, periwound fibrosis and periwound inflammation without purulence or cellulitis.        Impression: Traumatically induced right lateral calf wound, chronic    Plan: We have attempted to explain to the patient the importance of interstitial edema control, lymphatic dysfunction management, lymphedema, we have offered 2 layer wrap, he is hesitant for any type of management regarding standard of care for edema management.  We will dress the wounds with steroid cream under Saran wrap for an hour now a daily basis, edema wear, Spandage  as outer layer though this is clearly not standard of care does not provide adequate inelastic compression over the edema wear to maximize dermal lymphatic functionality and edema resolution.  Will place PolyMem with Prontosan on wound.  This will be applied after the steroid under Saran wrap for an hour.  Ideally elevate leg when sitting.  Micronutrients..  Patient will return to the clinic in 6 weeks time  We have discussed with the patient that without better control of his interstitial edema, the wound resolution will be remarkably slow.    Juancho Guillen MD on 4/22/2021 at 11:50  AM

## 2021-04-22 NOTE — DISCHARGE INSTRUCTIONS
[facility]  [address] [address]    Call us at [phone] if you have any questions about your wounds, have redness or swelling around your wound, have a fever of 101 or greater or if you have any other problems or concerns. We answer the phone Monday through Friday 8 am to 4 pm, please leave a message as we check the voicemail frequently throughout the day.     Ramu Quigley      1940    Medications/supplements to aid in healing:  Hesperidin Diosmin 1,000 mg tablet twice daily order from [employer]  Vitamin D3 10,000 iu per day  Vitamin C 2,000 mg daily  Methyl Folate 1000 mcg daily  Vitamin B 12 1000 mcg daily    Wound care recommendations to Right Ankle  Cleanse leg with soap and water in shower. Then apply prontosan wound solution on wound with gauze. Then Apply a thin layer of Betamethasone Ointment to intact skin on legs. Then wrap with saran wrap for one hour. Remove saran wrap and do not rinse. Then apply moisturizing cream (Cera-Ve, Aquaphor, Eucerin, ect.)  Apply Polymem to wound base Edemawear blue stripe from base of toes to knee.  Secure with roll gauze and tape. Change once day    Compression: Apply clean spandigrip E wear at all times.   Remove compression dressing if toes turn blue and/or start to feel numb and is not relieved by elevating the leg for one hour.  Walk as much as you can. When you sit raise your ankle above your hips to promote wound healing     RICHARD Guillen M.D.. April 22, 2021      If you had a positive experience please indicate on your patient satisfaction survey form that [facility] will be sending you.

## 2021-04-22 NOTE — ADDENDUM NOTE
Encounter addended by: Robyn Rdz RN on: 4/22/2021 12:00 PM   Actions taken: Edited Discharge Instructions

## 2021-06-03 ENCOUNTER — TELEPHONE (OUTPATIENT)
Dept: WOUND CARE | Facility: CLINIC | Age: 81
End: 2021-06-03

## 2021-06-03 NOTE — TELEPHONE ENCOUNTER
[facility] [facility]    Who is the name of the provider?:  Mindy      What is the location you see this provider at?: [place]    Reason for call:  Please call to reschedule 6/8 appt, will be out of town on 6/8.    Can we leave a detailed message on this number?  YES

## 2021-06-28 ENCOUNTER — HOSPITAL ENCOUNTER (OUTPATIENT)
Dept: WOUND CARE | Facility: CLINIC | Age: 81
Discharge: HOME | End: 2021-06-28
Attending: SURGERY | Admitting: SURGERY
Payer: MEDICARE

## 2021-06-28 VITALS
HEART RATE: 63 BPM | TEMPERATURE: 98 F | RESPIRATION RATE: 16 BRPM | DIASTOLIC BLOOD PRESSURE: 91 MMHG | SYSTOLIC BLOOD PRESSURE: 151 MMHG

## 2021-06-28 DIAGNOSIS — L97.912 ULCER OF RIGHT LOWER EXTREMITY WITH FAT LAYER EXPOSED (H): ICD-10-CM

## 2021-06-28 PROCEDURE — 97602 WOUND(S) CARE NON-SELECTIVE: CPT

## 2021-06-28 PROCEDURE — 99213 OFFICE O/P EST LOW 20 MIN: CPT | Performed by: SURGERY

## 2021-06-28 RX ORDER — HYDROXYZINE HYDROCHLORIDE 25 MG/1
TABLET, FILM COATED ORAL
COMMUNITY
Start: 2021-04-26 | End: 2022-07-08

## 2021-06-28 NOTE — DISCHARGE INSTRUCTIONS
[facility]  [address] [address]    Call us at [phone] if you have any questions about your wounds, have redness or swelling around your wound, have a fever of 101 or greater or if you have any other problems or concerns. We answer the phone Monday through Friday 8 am to 4 pm, please leave a message as we check the voicemail frequently throughout the day.     Ramu Quigley      1940    Medications/supplements to aid in healing:  Hesperidin Diosmin 1,000 mg tablet twice daily order from [employer]  Vitamin D3 10,000 iu per day  Vitamin C 2,000 mg daily  Methyl Folate 1000 mcg daily  Vitamin B 12 1000 mcg daily    Wound care recommendations to Right Ankle  Cleanse leg with soap and water in shower. Then apply prontosan wound solution on wound with gauze. Then Apply a thin layer of Betamethasone Ointment to intact skin on legs. Then wrap with saran wrap for one hour. Remove saran wrap and do not rinse. Then apply moisturizing cream (Cera-Ve, Aquaphor, Eucerin, ect.)  Apply Polymem to wound base Edemawear blue stripe from base of toes to knee.  Secure with roll gauze and tape. Change once day    Compression: Apply clean spandigrip E wear at all times.  Remove compression dressing if toes turn blue and/or start to feel numb and is not relieved by elevating the leg for one hour.  Walk as much as you can. When you sit raise your ankle above your hips to promote wound healing.     You have been referred to [facility] at [phone]. Call to make an appointment with them.     RICHARD Guillen M.D. June 28, 2021    Wound Clinic follow up - call clinic when you know your dermatology appointment    If you had a positive experience please indicate that on your patient satisfaction survey form that [facility] will be sending you.    It was a pleasure meeting with you today.  Thank you for allowing me and my team the privilege of  caring for you today.  YOU are the reason we are here, and I truly hope we provided you with the excellent service you deserve.  Please let us know if there is anything else we can do for you so that we can be sure you are leaving completely satisfied with your care experience.      If you have any billing related questions please call the [facility] Business office at [phone]. The clinic staff does not handle billing related matters.

## 2021-06-28 NOTE — PROGRESS NOTES
[facility] Wound [facility]aling [place] Progress Note    Subject: Ramu Quigley 2-year history of right lateral calf ulceration which she states initiated secondary to being struck by a machinery from a lawnmower.  Normal MTHFR status.  Dermatopathology from March 3, 2021 not entirely diagnostic, favor venous stasis ulcer, mixed cell infiltrate of predominantly neutrophils.  One of our additional concerns was the potential for pyoderma given 2-year history of mild healing after trauma.  Ankle-brachial disease within normal limits, does not utilize tobacco, nondiabetic.    Patient Active Problem List   Diagnosis     Anemia     Asthma     Chronic cough     Edema of lower extremity     Essential hypertension     Essential thrombocytosis (H)     Essential thrombocythemia (H)     Gastrointestinal hemorrhage     Gastrointestinal hemorrhage with melena     High frequency deafness     History of Graves' disease     Hypothyroidism (acquired)     Hypothyroidism following radioiodine therapy     Memory impairment     Moderate COPD (chronic obstructive pulmonary disease) (H)     Obstructive sleep apnea syndrome     Osteoporosis     Periodic limb movement disorder     Personal history of nicotine dependence     Personal history of other malignant neoplasm of skin     Pruritus     Testosterone deficiency     Non-pressure chronic ulcer of unspecified part of right lower leg limited to breakdown of skin (H)     Ulcer of right lower extremity with fat layer exposed (H)     Past Medical History:   Diagnosis Date     Asthma      COPD (chronic obstructive pulmonary disease) (H)      Essential thrombocytopenia (H)      Graves disease      Hypertension      Hypothyroidism      Lumbar disc herniation      Testosterone deficiency      Exam:  BP (!) 151/91   Pulse 63   Temp 98  F (36.7  C) (Temporal)   Resp 16   Wound (used by OP [facility] only) 03/03/21 1237 Right (Active)   Dressing Appearance moist drainage 06/28/21 1107   Length (cm) 5  06/28/21 1107   Width (cm) 2.2 06/28/21 1107   Depth (cm) 0.5 06/28/21 1107   Wound (cm^2) 11 cm^2 06/28/21 1107   Wound Volume (cm^3) 5.5 cm^3 06/28/21 1107   Wound healing % -71.88 06/28/21 1107   Drainage Characteristics/Odor serosanguineous;yellow 06/28/21 1107   Drainage Amount moderate 06/28/21 1107     See photodocumentation and wound measurements, wound has slightly increased in size compared to most recent evaluation, he was utilizing edema wear, adequate management of interstitial edema.      Impression: 2-year history of right lateral calf wound, initially caused by trauma    Plan: Initial consultation on 3 months ago, minimal progress has been made despite standard of care management and patient adherence to treatment regime.  I am requesting a consultation with dermatology specialist to reconsider potential diagnosis pyoderma versus other alternative diagnosis.  Depending on the dermatology consultation, we have discussed ultrasonic debridement in the operating room with application of ovine foregut, will make this decision upon dermatology consultation and evaluation.  We will dress the wounds with PolyMem after hypochlorous acid soak, edema wear, micronized purified flavonoid fraction, adjunctive micronutrients..  Patient will return to the clinic in 2-3 weeks time with a phone call discuss options after dermatology evaluation.    Juancho Guillen MD on 6/28/2021 at 11:27 AM       PT orders received, no formal activity orders. Consulted with nurse Alma Rosa WILCOX. Pt may participate in PT evaluation./yes

## 2021-08-04 ENCOUNTER — LAB REQUISITION (OUTPATIENT)
Dept: LAB | Facility: CLINIC | Age: 81
End: 2021-08-04
Payer: MEDICARE

## 2021-08-04 DIAGNOSIS — L08.9 LOCAL INFECTION OF THE SKIN AND SUBCUTANEOUS TISSUE, UNSPECIFIED: ICD-10-CM

## 2021-08-04 PROCEDURE — 87077 CULTURE AEROBIC IDENTIFY: CPT | Mod: ORL,59 | Performed by: DERMATOLOGY

## 2021-08-08 LAB
BACTERIA SPEC CULT: ABNORMAL

## 2022-07-08 ENCOUNTER — HOSPITAL ENCOUNTER (INPATIENT)
Facility: HOSPITAL | Age: 82
LOS: 2 days | Discharge: HOME | DRG: 354 | End: 2022-07-10
Attending: EMERGENCY MEDICINE | Admitting: INTERNAL MEDICINE
Payer: MEDICARE

## 2022-07-08 DIAGNOSIS — K56.609 SMALL BOWEL OBSTRUCTION (H): ICD-10-CM

## 2022-07-08 LAB
ALBUMIN SERPL-MCNC: 3.4 G/DL (ref 3.5–5)
ALP SERPL-CCNC: 100 U/L (ref 45–120)
ALT SERPL W P-5'-P-CCNC: 11 U/L (ref 0–45)
AST SERPL W P-5'-P-CCNC: 22 U/L (ref 0–40)
BILIRUB DIRECT SERPL-MCNC: 0.3 MG/DL
BILIRUB SERPL-MCNC: 0.9 MG/DL (ref 0–1)
C REACTIVE PROTEIN LHE: 5.5 MG/DL (ref 0–?)
CK SERPL-CCNC: 128 U/L (ref 30–190)
CK SERPL-CCNC: 148 U/L (ref 30–190)
ETHANOL SERPL-MCNC: <10 MG/DL
LACTATE SERPL-SCNC: 1.4 MMOL/L (ref 0.7–2)
LIPASE SERPL-CCNC: 21 U/L (ref 0–52)
MAGNESIUM SERPL-MCNC: 2 MG/DL (ref 1.8–2.6)
PHOSPHATE SERPL-MCNC: 5.6 MG/DL (ref 2.5–4.5)
PROCALCITONIN SERPL-MCNC: 0.1 NG/ML (ref 0–0.49)
PROT SERPL-MCNC: 7.6 G/DL (ref 6–8)
SARS-COV-2 RNA RESP QL NAA+PROBE: NEGATIVE

## 2022-07-08 PROCEDURE — 258N000003 HC RX IP 258 OP 636: Performed by: EMERGENCY MEDICINE

## 2022-07-08 PROCEDURE — 99285 EMERGENCY DEPT VISIT HI MDM: CPT

## 2022-07-08 PROCEDURE — 250N000011 HC RX IP 250 OP 636: Performed by: INTERNAL MEDICINE

## 2022-07-08 PROCEDURE — 84145 PROCALCITONIN (PCT): CPT | Performed by: INTERNAL MEDICINE

## 2022-07-08 PROCEDURE — 87040 BLOOD CULTURE FOR BACTERIA: CPT | Performed by: INTERNAL MEDICINE

## 2022-07-08 PROCEDURE — 83735 ASSAY OF MAGNESIUM: CPT | Performed by: INTERNAL MEDICINE

## 2022-07-08 PROCEDURE — 96361 HYDRATE IV INFUSION ADD-ON: CPT

## 2022-07-08 PROCEDURE — 82550 ASSAY OF CK (CPK): CPT | Performed by: INTERNAL MEDICINE

## 2022-07-08 PROCEDURE — 96360 HYDRATION IV INFUSION INIT: CPT

## 2022-07-08 PROCEDURE — 87635 SARS-COV-2 COVID-19 AMP PRB: CPT | Performed by: EMERGENCY MEDICINE

## 2022-07-08 PROCEDURE — C9803 HOPD COVID-19 SPEC COLLECT: HCPCS

## 2022-07-08 PROCEDURE — 82746 ASSAY OF FOLIC ACID SERUM: CPT | Performed by: INTERNAL MEDICINE

## 2022-07-08 PROCEDURE — 99223 1ST HOSP IP/OBS HIGH 75: CPT | Mod: AI | Performed by: INTERNAL MEDICINE

## 2022-07-08 PROCEDURE — 82077 ASSAY SPEC XCP UR&BREATH IA: CPT | Performed by: INTERNAL MEDICINE

## 2022-07-08 PROCEDURE — 120N000001 HC R&B MED SURG/OB

## 2022-07-08 PROCEDURE — 80076 HEPATIC FUNCTION PANEL: CPT | Performed by: EMERGENCY MEDICINE

## 2022-07-08 PROCEDURE — 82607 VITAMIN B-12: CPT | Performed by: INTERNAL MEDICINE

## 2022-07-08 PROCEDURE — 86140 C-REACTIVE PROTEIN: CPT | Performed by: EMERGENCY MEDICINE

## 2022-07-08 PROCEDURE — 36415 COLL VENOUS BLD VENIPUNCTURE: CPT | Performed by: INTERNAL MEDICINE

## 2022-07-08 PROCEDURE — 36415 COLL VENOUS BLD VENIPUNCTURE: CPT | Performed by: EMERGENCY MEDICINE

## 2022-07-08 PROCEDURE — 84100 ASSAY OF PHOSPHORUS: CPT | Performed by: INTERNAL MEDICINE

## 2022-07-08 PROCEDURE — C9113 INJ PANTOPRAZOLE SODIUM, VIA: HCPCS | Performed by: INTERNAL MEDICINE

## 2022-07-08 PROCEDURE — 99222 1ST HOSP IP/OBS MODERATE 55: CPT | Mod: 57 | Performed by: SPECIALIST

## 2022-07-08 PROCEDURE — 83690 ASSAY OF LIPASE: CPT | Performed by: EMERGENCY MEDICINE

## 2022-07-08 PROCEDURE — 83605 ASSAY OF LACTIC ACID: CPT | Performed by: EMERGENCY MEDICINE

## 2022-07-08 PROCEDURE — 84443 ASSAY THYROID STIM HORMONE: CPT | Performed by: INTERNAL MEDICINE

## 2022-07-08 RX ORDER — PIPERACILLIN SODIUM, TAZOBACTAM SODIUM 3; .375 G/15ML; G/15ML
3.38 INJECTION, POWDER, LYOPHILIZED, FOR SOLUTION INTRAVENOUS EVERY 8 HOURS
Status: DISCONTINUED | OUTPATIENT
Start: 2022-07-09 | End: 2022-07-10 | Stop reason: HOSPADM

## 2022-07-08 RX ORDER — ONDANSETRON 2 MG/ML
4 INJECTION INTRAMUSCULAR; INTRAVENOUS ONCE
Status: DISCONTINUED | OUTPATIENT
Start: 2022-07-08 | End: 2022-07-08

## 2022-07-08 RX ORDER — NALOXONE HYDROCHLORIDE 0.4 MG/ML
0.4 INJECTION, SOLUTION INTRAMUSCULAR; INTRAVENOUS; SUBCUTANEOUS
Status: DISCONTINUED | OUTPATIENT
Start: 2022-07-08 | End: 2022-07-10 | Stop reason: HOSPADM

## 2022-07-08 RX ORDER — THIAMINE HYDROCHLORIDE 100 MG/ML
100 INJECTION, SOLUTION INTRAMUSCULAR; INTRAVENOUS DAILY
Status: DISCONTINUED | OUTPATIENT
Start: 2022-07-08 | End: 2022-07-10 | Stop reason: HOSPADM

## 2022-07-08 RX ORDER — NALOXONE HYDROCHLORIDE 0.4 MG/ML
0.2 INJECTION, SOLUTION INTRAMUSCULAR; INTRAVENOUS; SUBCUTANEOUS
Status: DISCONTINUED | OUTPATIENT
Start: 2022-07-08 | End: 2022-07-10 | Stop reason: HOSPADM

## 2022-07-08 RX ORDER — ONDANSETRON 2 MG/ML
4 INJECTION INTRAMUSCULAR; INTRAVENOUS EVERY 6 HOURS PRN
Status: DISCONTINUED | OUTPATIENT
Start: 2022-07-08 | End: 2022-07-09

## 2022-07-08 RX ORDER — LIDOCAINE 40 MG/G
CREAM TOPICAL
Status: DISCONTINUED | OUTPATIENT
Start: 2022-07-08 | End: 2022-07-10 | Stop reason: HOSPADM

## 2022-07-08 RX ORDER — MORPHINE SULFATE 4 MG/ML
4 INJECTION, SOLUTION INTRAMUSCULAR; INTRAVENOUS ONCE
Status: DISCONTINUED | OUTPATIENT
Start: 2022-07-08 | End: 2022-07-08

## 2022-07-08 RX ORDER — FOLIC ACID 5 MG/ML
1 INJECTION, SOLUTION INTRAMUSCULAR; INTRAVENOUS; SUBCUTANEOUS DAILY
Status: DISCONTINUED | OUTPATIENT
Start: 2022-07-08 | End: 2022-07-10 | Stop reason: HOSPADM

## 2022-07-08 RX ORDER — SODIUM CHLORIDE 9 MG/ML
INJECTION, SOLUTION INTRAVENOUS CONTINUOUS
Status: DISCONTINUED | OUTPATIENT
Start: 2022-07-08 | End: 2022-07-10 | Stop reason: HOSPADM

## 2022-07-08 RX ORDER — PIPERACILLIN SODIUM, TAZOBACTAM SODIUM 3; .375 G/15ML; G/15ML
3.38 INJECTION, POWDER, LYOPHILIZED, FOR SOLUTION INTRAVENOUS EVERY 8 HOURS
Status: DISCONTINUED | OUTPATIENT
Start: 2022-07-08 | End: 2022-07-08 | Stop reason: DRUGHIGH

## 2022-07-08 RX ORDER — SODIUM CHLORIDE 9 MG/ML
INJECTION, SOLUTION INTRAVENOUS CONTINUOUS
Status: DISCONTINUED | OUTPATIENT
Start: 2022-07-08 | End: 2022-07-09

## 2022-07-08 RX ORDER — HYDRALAZINE HYDROCHLORIDE 20 MG/ML
10 INJECTION INTRAMUSCULAR; INTRAVENOUS EVERY 6 HOURS PRN
Status: DISCONTINUED | OUTPATIENT
Start: 2022-07-08 | End: 2022-07-10 | Stop reason: HOSPADM

## 2022-07-08 RX ORDER — LEVOTHYROXINE SODIUM 100 UG/1
100 TABLET ORAL DAILY
Status: DISCONTINUED | OUTPATIENT
Start: 2022-07-09 | End: 2022-07-09

## 2022-07-08 RX ORDER — PIPERACILLIN SODIUM, TAZOBACTAM SODIUM 3; .375 G/15ML; G/15ML
3.38 INJECTION, POWDER, LYOPHILIZED, FOR SOLUTION INTRAVENOUS ONCE
Status: COMPLETED | OUTPATIENT
Start: 2022-07-08 | End: 2022-07-08

## 2022-07-08 RX ORDER — ALBUTEROL SULFATE 90 UG/1
2 AEROSOL, METERED RESPIRATORY (INHALATION) EVERY 4 HOURS PRN
Status: DISCONTINUED | OUTPATIENT
Start: 2022-07-08 | End: 2022-07-10 | Stop reason: HOSPADM

## 2022-07-08 RX ORDER — FERROUS SULFATE 325(65) MG
325 TABLET ORAL
COMMUNITY

## 2022-07-08 RX ORDER — ONDANSETRON 4 MG/1
4 TABLET, ORALLY DISINTEGRATING ORAL EVERY 6 HOURS PRN
Status: DISCONTINUED | OUTPATIENT
Start: 2022-07-08 | End: 2022-07-09

## 2022-07-08 RX ADMIN — PANTOPRAZOLE SODIUM 40 MG: 40 INJECTION, POWDER, FOR SOLUTION INTRAVENOUS at 21:22

## 2022-07-08 RX ADMIN — FOLIC ACID 1 MG: 5 INJECTION, SOLUTION INTRAMUSCULAR; INTRAVENOUS; SUBCUTANEOUS at 23:25

## 2022-07-08 RX ADMIN — SODIUM CHLORIDE 1000 ML: 9 INJECTION, SOLUTION INTRAVENOUS at 20:01

## 2022-07-08 RX ADMIN — THIAMINE HYDROCHLORIDE 100 MG: 100 INJECTION, SOLUTION INTRAMUSCULAR; INTRAVENOUS at 23:24

## 2022-07-08 RX ADMIN — PIPERACILLIN AND TAZOBACTAM 3.38 G: 3; .375 INJECTION, POWDER, LYOPHILIZED, FOR SOLUTION INTRAVENOUS at 22:46

## 2022-07-08 RX ADMIN — SODIUM CHLORIDE: 9 INJECTION, SOLUTION INTRAVENOUS at 22:50

## 2022-07-08 ASSESSMENT — ACTIVITIES OF DAILY LIVING (ADL)
DOING_ERRANDS_INDEPENDENTLY_DIFFICULTY: NO
WEAR_GLASSES_OR_BLIND: YES
CONCENTRATING,_REMEMBERING_OR_MAKING_DECISIONS_DIFFICULTY: YES
CHANGE_IN_FUNCTIONAL_STATUS_SINCE_ONSET_OF_CURRENT_ILLNESS/INJURY: NO
DRESSING/BATHING_DIFFICULTY: NO
ADLS_ACUITY_SCORE: 21
TOILETING_ISSUES: NO
FALL_HISTORY_WITHIN_LAST_SIX_MONTHS: NO
DIFFICULTY_EATING/SWALLOWING: NO
ADLS_ACUITY_SCORE: 35
WALKING_OR_CLIMBING_STAIRS_DIFFICULTY: NO

## 2022-07-08 ASSESSMENT — ENCOUNTER SYMPTOMS
VOMITING: 1
NAUSEA: 0
ABDOMINAL PAIN: 0
ABDOMINAL DISTENTION: 1

## 2022-07-08 NOTE — ED NOTES
Expected Patient Referral to ED  6:23 PM    Referring Clinic/Provider:  [facility]     Reason for referral/Clinical facts:  He had 3 days of colicky abdominal pain and then today began distended and had nausea and vomiting.  A CT scan was done which showed a high-grade bowel obstruction with a transition point in the right lower quadrant.  He is being sent to the ER for evaluation and admission.  Surgery has not been consulted.    Recommendations provided:  Send to ED for further evaluation    Caller was informed that this institution does possess the capabilities and/or resources to provide for patient and should be transferred to our facility.    Discussed that if direct admit is sought and any hurdles are encountered, this ED would be happy to see the patient and evaluate.    Informed caller that recommendations provided are recommendations based only on the facts provided and that they responsible to accept or reject the advice, or to seek a formal in person consultation as needed and that this ED will see/treat patient should they arrive.      Fabio Bustillo MD  [facility] EMERGENCY DEPARTMENT  [address] [place] [id number]  [phone]       Fabio Bustillo MD  07/08/22 3239

## 2022-07-09 ENCOUNTER — APPOINTMENT (OUTPATIENT)
Dept: RADIOLOGY | Facility: HOSPITAL | Age: 82
DRG: 354 | End: 2022-07-09
Attending: SPECIALIST
Payer: MEDICARE

## 2022-07-09 ENCOUNTER — APPOINTMENT (OUTPATIENT)
Dept: RADIOLOGY | Facility: HOSPITAL | Age: 82
DRG: 354 | End: 2022-07-09
Attending: INTERNAL MEDICINE
Payer: MEDICARE

## 2022-07-09 ENCOUNTER — ANESTHESIA (OUTPATIENT)
Dept: SURGERY | Facility: HOSPITAL | Age: 82
DRG: 354 | End: 2022-07-09
Payer: MEDICARE

## 2022-07-09 ENCOUNTER — APPOINTMENT (OUTPATIENT)
Dept: RADIOLOGY | Facility: HOSPITAL | Age: 82
DRG: 354 | End: 2022-07-09
Payer: MEDICARE

## 2022-07-09 ENCOUNTER — ANESTHESIA EVENT (OUTPATIENT)
Dept: SURGERY | Facility: HOSPITAL | Age: 82
DRG: 354 | End: 2022-07-09
Payer: MEDICARE

## 2022-07-09 ENCOUNTER — APPOINTMENT (OUTPATIENT)
Dept: PHYSICAL THERAPY | Facility: HOSPITAL | Age: 82
DRG: 354 | End: 2022-07-09
Attending: INTERNAL MEDICINE
Payer: MEDICARE

## 2022-07-09 ENCOUNTER — APPOINTMENT (OUTPATIENT)
Dept: OCCUPATIONAL THERAPY | Facility: HOSPITAL | Age: 82
DRG: 354 | End: 2022-07-09
Attending: INTERNAL MEDICINE
Payer: MEDICARE

## 2022-07-09 LAB
ALBUMIN SERPL-MCNC: 3 G/DL (ref 3.5–5)
ALBUMIN UR-MCNC: 20 MG/DL
ALP SERPL-CCNC: 86 U/L (ref 45–120)
ALT SERPL W P-5'-P-CCNC: 12 U/L (ref 0–45)
ANION GAP SERPL CALCULATED.3IONS-SCNC: 13 MMOL/L (ref 5–18)
APPEARANCE UR: CLEAR
AST SERPL W P-5'-P-CCNC: 20 U/L (ref 0–40)
BACTERIA #/AREA URNS HPF: ABNORMAL /HPF
BILIRUB SERPL-MCNC: 1 MG/DL (ref 0–1)
BILIRUB UR QL STRIP: NEGATIVE
BUN SERPL-MCNC: 55 MG/DL (ref 8–28)
CALCIUM SERPL-MCNC: 8.9 MG/DL (ref 8.5–10.5)
CHLORIDE BLD-SCNC: 103 MMOL/L (ref 98–107)
CO2 SERPL-SCNC: 23 MMOL/L (ref 22–31)
COLOR UR AUTO: YELLOW
CREAT SERPL-MCNC: 2.32 MG/DL (ref 0.7–1.3)
ERYTHROCYTE [DISTWIDTH] IN BLOOD BY AUTOMATED COUNT: 13.5 % (ref 10–15)
FOLATE SERPL-MCNC: 18.2 NG/ML (ref 4.6–34.8)
GFR SERPL CREATININE-BSD FRML MDRD: 27 ML/MIN/1.73M2
GLUCOSE BLD-MCNC: 92 MG/DL (ref 70–125)
GLUCOSE UR STRIP-MCNC: NEGATIVE MG/DL
HCT VFR BLD AUTO: 34.5 % (ref 40–53)
HGB BLD-MCNC: 10.8 G/DL (ref 13.3–17.7)
HGB UR QL STRIP: NEGATIVE
HYALINE CASTS: 21 /LPF
KETONES UR STRIP-MCNC: ABNORMAL MG/DL
LEUKOCYTE ESTERASE UR QL STRIP: NEGATIVE
MCH RBC QN AUTO: 31.1 PG (ref 26.5–33)
MCHC RBC AUTO-ENTMCNC: 31.3 G/DL (ref 31.5–36.5)
MCV RBC AUTO: 99 FL (ref 78–100)
MUCOUS THREADS #/AREA URNS LPF: PRESENT /LPF
NITRATE UR QL: NEGATIVE
PH UR STRIP: 5 [PH] (ref 5–7)
PLATELET # BLD AUTO: 590 10E3/UL (ref 150–450)
POTASSIUM BLD-SCNC: 4.1 MMOL/L (ref 3.5–5)
PROT SERPL-MCNC: 6.7 G/DL (ref 6–8)
RBC # BLD AUTO: 3.47 10E6/UL (ref 4.4–5.9)
RBC URINE: <1 /HPF
SODIUM SERPL-SCNC: 139 MMOL/L (ref 136–145)
SP GR UR STRIP: 1.02 (ref 1–1.03)
SQUAMOUS EPITHELIAL: <1 /HPF
TSH SERPL DL<=0.005 MIU/L-ACNC: 0.49 UIU/ML (ref 0.3–5)
UROBILINOGEN UR STRIP-MCNC: <2 MG/DL
VIT B12 SERPL-MCNC: 932 PG/ML (ref 232–1245)
WBC # BLD AUTO: 11.2 10E3/UL (ref 4–11)
WBC URINE: 2 /HPF

## 2022-07-09 PROCEDURE — 0JDP0ZZ EXTRACTION OF LEFT LOWER LEG SUBCUTANEOUS TISSUE AND FASCIA, OPEN APPROACH: ICD-10-PCS | Performed by: SPECIALIST

## 2022-07-09 PROCEDURE — 258N000003 HC RX IP 258 OP 636: Performed by: SPECIALIST

## 2022-07-09 PROCEDURE — 0WUF0JZ SUPPLEMENT ABDOMINAL WALL WITH SYNTHETIC SUBSTITUTE, OPEN APPROACH: ICD-10-PCS | Performed by: SPECIALIST

## 2022-07-09 PROCEDURE — 370N000017 HC ANESTHESIA TECHNICAL FEE, PER MIN: Performed by: SPECIALIST

## 2022-07-09 PROCEDURE — 258N000003 HC RX IP 258 OP 636: Performed by: NURSE ANESTHETIST, CERTIFIED REGISTERED

## 2022-07-09 PROCEDURE — 81001 URINALYSIS AUTO W/SCOPE: CPT | Performed by: INTERNAL MEDICINE

## 2022-07-09 PROCEDURE — 82040 ASSAY OF SERUM ALBUMIN: CPT | Performed by: INTERNAL MEDICINE

## 2022-07-09 PROCEDURE — 80053 COMPREHEN METABOLIC PANEL: CPT | Performed by: INTERNAL MEDICINE

## 2022-07-09 PROCEDURE — 250N000011 HC RX IP 250 OP 636: Performed by: SPECIALIST

## 2022-07-09 PROCEDURE — 74018 RADEX ABDOMEN 1 VIEW: CPT

## 2022-07-09 PROCEDURE — 250N000009 HC RX 250: Performed by: NURSE ANESTHETIST, CERTIFIED REGISTERED

## 2022-07-09 PROCEDURE — 11045 DBRDMT SUBQ TISS EACH ADDL: CPT | Mod: 51 | Performed by: SPECIALIST

## 2022-07-09 PROCEDURE — C1781 MESH (IMPLANTABLE): HCPCS | Performed by: SPECIALIST

## 2022-07-09 PROCEDURE — 2894A PR VOIDCORRECT: CPT | Mod: 57 | Performed by: SPECIALIST

## 2022-07-09 PROCEDURE — 49590 PR REPAIR SPIEGELIAN HERNIA: CPT | Performed by: SPECIALIST

## 2022-07-09 PROCEDURE — 250N000025 HC SEVOFLURANE, PER MIN: Performed by: SPECIALIST

## 2022-07-09 PROCEDURE — 11042 DBRDMT SUBQ TIS 1ST 20SQCM/<: CPT | Mod: 51 | Performed by: SPECIALIST

## 2022-07-09 PROCEDURE — 710N000009 HC RECOVERY PHASE 1, LEVEL 1, PER MIN: Performed by: SPECIALIST

## 2022-07-09 PROCEDURE — 0JDN0ZZ EXTRACTION OF RIGHT LOWER LEG SUBCUTANEOUS TISSUE AND FASCIA, OPEN APPROACH: ICD-10-PCS | Performed by: SPECIALIST

## 2022-07-09 PROCEDURE — 250N000013 HC RX MED GY IP 250 OP 250 PS 637: Performed by: SPECIALIST

## 2022-07-09 PROCEDURE — 85027 COMPLETE CBC AUTOMATED: CPT | Performed by: INTERNAL MEDICINE

## 2022-07-09 PROCEDURE — 97535 SELF CARE MNGMENT TRAINING: CPT | Mod: GO

## 2022-07-09 PROCEDURE — 88302 TISSUE EXAM BY PATHOLOGIST: CPT | Mod: TC | Performed by: SPECIALIST

## 2022-07-09 PROCEDURE — 250N000009 HC RX 250: Performed by: SPECIALIST

## 2022-07-09 PROCEDURE — 250N000011 HC RX IP 250 OP 636: Performed by: INTERNAL MEDICINE

## 2022-07-09 PROCEDURE — 999N000065 XR ABDOMEN 1 VIEW

## 2022-07-09 PROCEDURE — 36415 COLL VENOUS BLD VENIPUNCTURE: CPT | Performed by: INTERNAL MEDICINE

## 2022-07-09 PROCEDURE — 250N000011 HC RX IP 250 OP 636: Performed by: NURSE ANESTHETIST, CERTIFIED REGISTERED

## 2022-07-09 PROCEDURE — 97161 PT EVAL LOW COMPLEX 20 MIN: CPT | Mod: GP

## 2022-07-09 PROCEDURE — 258N000003 HC RX IP 258 OP 636: Performed by: INTERNAL MEDICINE

## 2022-07-09 PROCEDURE — 97116 GAIT TRAINING THERAPY: CPT | Mod: GP

## 2022-07-09 PROCEDURE — 360N000076 HC SURGERY LEVEL 3, PER MIN: Performed by: SPECIALIST

## 2022-07-09 PROCEDURE — 120N000001 HC R&B MED SURG/OB

## 2022-07-09 PROCEDURE — 272N000001 HC OR GENERAL SUPPLY STERILE: Performed by: SPECIALIST

## 2022-07-09 PROCEDURE — 97166 OT EVAL MOD COMPLEX 45 MIN: CPT | Mod: GO

## 2022-07-09 PROCEDURE — 97597 DBRDMT OPN WND 1ST 20 CM/<: CPT | Mod: 59 | Performed by: SPECIALIST

## 2022-07-09 PROCEDURE — 87040 BLOOD CULTURE FOR BACTERIA: CPT | Performed by: INTERNAL MEDICINE

## 2022-07-09 PROCEDURE — 99233 SBSQ HOSP IP/OBS HIGH 50: CPT | Performed by: INTERNAL MEDICINE

## 2022-07-09 PROCEDURE — 999N000141 HC STATISTIC PRE-PROCEDURE NURSING ASSESSMENT: Performed by: SPECIALIST

## 2022-07-09 PROCEDURE — C9113 INJ PANTOPRAZOLE SODIUM, VIA: HCPCS | Performed by: INTERNAL MEDICINE

## 2022-07-09 DEVICE — POLYPROPYLENE NON-ABSORBABLE SYNTHETIC SURGICAL MESH
Type: IMPLANTABLE DEVICE | Site: ABDOMEN | Status: FUNCTIONAL
Brand: PROLENE

## 2022-07-09 RX ORDER — ONDANSETRON 4 MG/1
4 TABLET, ORALLY DISINTEGRATING ORAL EVERY 6 HOURS PRN
Status: DISCONTINUED | OUTPATIENT
Start: 2022-07-09 | End: 2022-07-10 | Stop reason: HOSPADM

## 2022-07-09 RX ORDER — ACETAMINOPHEN 325 MG/1
975 TABLET ORAL EVERY 8 HOURS
Status: DISCONTINUED | OUTPATIENT
Start: 2022-07-09 | End: 2022-07-10 | Stop reason: HOSPADM

## 2022-07-09 RX ORDER — ONDANSETRON 4 MG/1
4 TABLET, ORALLY DISINTEGRATING ORAL EVERY 30 MIN PRN
Status: DISCONTINUED | OUTPATIENT
Start: 2022-07-09 | End: 2022-07-09 | Stop reason: HOSPADM

## 2022-07-09 RX ORDER — CEFAZOLIN SODIUM/WATER 2 G/20 ML
2 SYRINGE (ML) INTRAVENOUS
Status: DISCONTINUED | OUTPATIENT
Start: 2022-07-09 | End: 2022-07-09 | Stop reason: HOSPADM

## 2022-07-09 RX ORDER — HYDROMORPHONE HYDROCHLORIDE 1 MG/ML
0.2 INJECTION, SOLUTION INTRAMUSCULAR; INTRAVENOUS; SUBCUTANEOUS EVERY 5 MIN PRN
Status: DISCONTINUED | OUTPATIENT
Start: 2022-07-09 | End: 2022-07-09 | Stop reason: HOSPADM

## 2022-07-09 RX ORDER — LIDOCAINE 40 MG/G
CREAM TOPICAL
Status: DISCONTINUED | OUTPATIENT
Start: 2022-07-09 | End: 2022-07-10 | Stop reason: HOSPADM

## 2022-07-09 RX ORDER — ONDANSETRON 2 MG/ML
4 INJECTION INTRAMUSCULAR; INTRAVENOUS EVERY 30 MIN PRN
Status: DISCONTINUED | OUTPATIENT
Start: 2022-07-09 | End: 2022-07-09 | Stop reason: HOSPADM

## 2022-07-09 RX ORDER — ACETAMINOPHEN 325 MG/1
975 TABLET ORAL ONCE
Status: DISCONTINUED | OUTPATIENT
Start: 2022-07-09 | End: 2022-07-09 | Stop reason: HOSPADM

## 2022-07-09 RX ORDER — BUPIVACAINE HYDROCHLORIDE 2.5 MG/ML
INJECTION, SOLUTION EPIDURAL; INFILTRATION; INTRACAUDAL PRN
Status: DISCONTINUED | OUTPATIENT
Start: 2022-07-09 | End: 2022-07-09 | Stop reason: HOSPADM

## 2022-07-09 RX ORDER — POLYETHYLENE GLYCOL 3350 17 G/17G
17 POWDER, FOR SOLUTION ORAL DAILY
Status: DISCONTINUED | OUTPATIENT
Start: 2022-07-10 | End: 2022-07-10 | Stop reason: HOSPADM

## 2022-07-09 RX ORDER — AMOXICILLIN 250 MG
1 CAPSULE ORAL 2 TIMES DAILY
Status: DISCONTINUED | OUTPATIENT
Start: 2022-07-09 | End: 2022-07-10 | Stop reason: HOSPADM

## 2022-07-09 RX ORDER — OXYCODONE HYDROCHLORIDE 5 MG/1
5 TABLET ORAL EVERY 4 HOURS PRN
Status: DISCONTINUED | OUTPATIENT
Start: 2022-07-09 | End: 2022-07-09 | Stop reason: HOSPADM

## 2022-07-09 RX ORDER — DEXAMETHASONE SODIUM PHOSPHATE 10 MG/ML
INJECTION, SOLUTION INTRAMUSCULAR; INTRAVENOUS PRN
Status: DISCONTINUED | OUTPATIENT
Start: 2022-07-09 | End: 2022-07-09

## 2022-07-09 RX ORDER — BACITRACIN ZINC 500 [USP'U]/G
OINTMENT TOPICAL PRN
Status: DISCONTINUED | OUTPATIENT
Start: 2022-07-09 | End: 2022-07-09

## 2022-07-09 RX ORDER — PROPOFOL 10 MG/ML
INJECTION, EMULSION INTRAVENOUS PRN
Status: DISCONTINUED | OUTPATIENT
Start: 2022-07-09 | End: 2022-07-09

## 2022-07-09 RX ORDER — SODIUM CHLORIDE, SODIUM LACTATE, POTASSIUM CHLORIDE, CALCIUM CHLORIDE 600; 310; 30; 20 MG/100ML; MG/100ML; MG/100ML; MG/100ML
INJECTION, SOLUTION INTRAVENOUS CONTINUOUS
Status: DISCONTINUED | OUTPATIENT
Start: 2022-07-09 | End: 2022-07-09 | Stop reason: HOSPADM

## 2022-07-09 RX ORDER — FENTANYL CITRATE 50 UG/ML
INJECTION, SOLUTION INTRAMUSCULAR; INTRAVENOUS PRN
Status: DISCONTINUED | OUTPATIENT
Start: 2022-07-09 | End: 2022-07-09

## 2022-07-09 RX ORDER — BISACODYL 10 MG
10 SUPPOSITORY, RECTAL RECTAL DAILY PRN
Status: DISCONTINUED | OUTPATIENT
Start: 2022-07-09 | End: 2022-07-10 | Stop reason: HOSPADM

## 2022-07-09 RX ORDER — ONDANSETRON 2 MG/ML
INJECTION INTRAMUSCULAR; INTRAVENOUS PRN
Status: DISCONTINUED | OUTPATIENT
Start: 2022-07-09 | End: 2022-07-09

## 2022-07-09 RX ORDER — FENTANYL CITRATE 50 UG/ML
25 INJECTION, SOLUTION INTRAMUSCULAR; INTRAVENOUS EVERY 5 MIN PRN
Status: DISCONTINUED | OUTPATIENT
Start: 2022-07-09 | End: 2022-07-09 | Stop reason: HOSPADM

## 2022-07-09 RX ORDER — PROCHLORPERAZINE MALEATE 5 MG
5 TABLET ORAL EVERY 6 HOURS PRN
Status: DISCONTINUED | OUTPATIENT
Start: 2022-07-09 | End: 2022-07-10 | Stop reason: HOSPADM

## 2022-07-09 RX ORDER — ONDANSETRON 2 MG/ML
4 INJECTION INTRAMUSCULAR; INTRAVENOUS EVERY 6 HOURS PRN
Status: DISCONTINUED | OUTPATIENT
Start: 2022-07-09 | End: 2022-07-10 | Stop reason: HOSPADM

## 2022-07-09 RX ORDER — HYDROMORPHONE HCL IN WATER/PF 6 MG/30 ML
0.2 PATIENT CONTROLLED ANALGESIA SYRINGE INTRAVENOUS
Status: DISCONTINUED | OUTPATIENT
Start: 2022-07-09 | End: 2022-07-10 | Stop reason: HOSPADM

## 2022-07-09 RX ORDER — ALBUTEROL SULFATE 5 MG/ML
2.5 SOLUTION RESPIRATORY (INHALATION) EVERY 6 HOURS PRN
Status: DISCONTINUED | OUTPATIENT
Start: 2022-07-09 | End: 2022-07-10 | Stop reason: HOSPADM

## 2022-07-09 RX ORDER — LEVOTHYROXINE SODIUM ANHYDROUS 100 UG/5ML
50 INJECTION, POWDER, LYOPHILIZED, FOR SOLUTION INTRAVENOUS DAILY
Status: DISCONTINUED | OUTPATIENT
Start: 2022-07-10 | End: 2022-07-10 | Stop reason: HOSPADM

## 2022-07-09 RX ORDER — ACETAMINOPHEN 325 MG/1
650 TABLET ORAL EVERY 4 HOURS PRN
Status: DISCONTINUED | OUTPATIENT
Start: 2022-07-12 | End: 2022-07-10 | Stop reason: HOSPADM

## 2022-07-09 RX ORDER — LIDOCAINE HYDROCHLORIDE 10 MG/ML
INJECTION, SOLUTION INFILTRATION; PERINEURAL PRN
Status: DISCONTINUED | OUTPATIENT
Start: 2022-07-09 | End: 2022-07-09

## 2022-07-09 RX ADMIN — DIATRIZOATE MEGLUMINE AND DIATRIZOATE SODIUM 75 ML: 660; 100 SOLUTION ORAL; RECTAL at 02:30

## 2022-07-09 RX ADMIN — PROPOFOL 150 MG: 10 INJECTION, EMULSION INTRAVENOUS at 13:30

## 2022-07-09 RX ADMIN — PHENYLEPHRINE HYDROCHLORIDE 100 MCG: 10 INJECTION INTRAVENOUS at 14:13

## 2022-07-09 RX ADMIN — PANTOPRAZOLE SODIUM 40 MG: 40 INJECTION, POWDER, FOR SOLUTION INTRAVENOUS at 11:37

## 2022-07-09 RX ADMIN — LIDOCAINE HYDROCHLORIDE 20 MG: 10 INJECTION, SOLUTION INFILTRATION; PERINEURAL at 13:30

## 2022-07-09 RX ADMIN — PHENYLEPHRINE HYDROCHLORIDE 100 MCG: 10 INJECTION INTRAVENOUS at 14:25

## 2022-07-09 RX ADMIN — SODIUM CHLORIDE: 9 INJECTION, SOLUTION INTRAVENOUS at 17:57

## 2022-07-09 RX ADMIN — PHENYLEPHRINE HYDROCHLORIDE 100 MCG: 10 INJECTION INTRAVENOUS at 13:59

## 2022-07-09 RX ADMIN — PIPERACILLIN AND TAZOBACTAM 3.38 G: 3; .375 INJECTION, POWDER, LYOPHILIZED, FOR SOLUTION INTRAVENOUS at 19:30

## 2022-07-09 RX ADMIN — PROPOFOL 50 MG: 10 INJECTION, EMULSION INTRAVENOUS at 13:47

## 2022-07-09 RX ADMIN — ACETAMINOPHEN 975 MG: 325 TABLET ORAL at 17:24

## 2022-07-09 RX ADMIN — SENNOSIDES AND DOCUSATE SODIUM 1 TABLET: 50; 8.6 TABLET ORAL at 21:02

## 2022-07-09 RX ADMIN — PIPERACILLIN AND TAZOBACTAM 3.38 G: 3; .375 INJECTION, POWDER, LYOPHILIZED, FOR SOLUTION INTRAVENOUS at 02:40

## 2022-07-09 RX ADMIN — PHENYLEPHRINE HYDROCHLORIDE 100 MCG: 10 INJECTION INTRAVENOUS at 14:07

## 2022-07-09 RX ADMIN — FENTANYL CITRATE 50 MCG: 50 INJECTION, SOLUTION INTRAMUSCULAR; INTRAVENOUS at 13:22

## 2022-07-09 RX ADMIN — ONDANSETRON 4 MG: 2 INJECTION INTRAMUSCULAR; INTRAVENOUS at 14:07

## 2022-07-09 RX ADMIN — SUGAMMADEX 400 MG: 100 INJECTION, SOLUTION INTRAVENOUS at 14:32

## 2022-07-09 RX ADMIN — PHENYLEPHRINE HYDROCHLORIDE 100 MCG: 10 INJECTION INTRAVENOUS at 13:43

## 2022-07-09 RX ADMIN — PHENYLEPHRINE HYDROCHLORIDE 100 MCG: 10 INJECTION INTRAVENOUS at 13:45

## 2022-07-09 RX ADMIN — ROCURONIUM BROMIDE 50 MG: 50 INJECTION, SOLUTION INTRAVENOUS at 13:45

## 2022-07-09 RX ADMIN — PROPOFOL 30 MG: 10 INJECTION, EMULSION INTRAVENOUS at 14:18

## 2022-07-09 RX ADMIN — SODIUM CHLORIDE: 9 INJECTION, SOLUTION INTRAVENOUS at 05:31

## 2022-07-09 RX ADMIN — PIPERACILLIN AND TAZOBACTAM 3.38 G: 3; .375 INJECTION, POWDER, LYOPHILIZED, FOR SOLUTION INTRAVENOUS at 11:39

## 2022-07-09 RX ADMIN — PROPOFOL 20 MG: 10 INJECTION, EMULSION INTRAVENOUS at 14:22

## 2022-07-09 RX ADMIN — FENTANYL CITRATE 50 MCG: 50 INJECTION, SOLUTION INTRAMUSCULAR; INTRAVENOUS at 13:55

## 2022-07-09 RX ADMIN — Medication 140 MG: at 13:30

## 2022-07-09 RX ADMIN — DEXAMETHASONE SODIUM PHOSPHATE 10 MG: 10 INJECTION, SOLUTION INTRAMUSCULAR; INTRAVENOUS at 13:36

## 2022-07-09 ASSESSMENT — ACTIVITIES OF DAILY LIVING (ADL)
ADLS_ACUITY_SCORE: 25
ADLS_ACUITY_SCORE: 23
ADLS_ACUITY_SCORE: 23
ADLS_ACUITY_SCORE: 21
ADLS_ACUITY_SCORE: 23
ADLS_ACUITY_SCORE: 25
ADLS_ACUITY_SCORE: 23
ADLS_ACUITY_SCORE: 25
ADLS_ACUITY_SCORE: 23
ADLS_ACUITY_SCORE: 23

## 2022-07-09 ASSESSMENT — COPD QUESTIONNAIRES
CAT_SEVERITY: MODERATE
COPD: 1

## 2022-07-09 NOTE — PLAN OF CARE
Goal Outcome Evaluation:        NG last saw in place at 0030. At 0110 pt confused and  pulled out  NG. NG replaced and new order for xray placed. Pt reeducated on NG. Will monitor.

## 2022-07-09 NOTE — ED PROVIDER NOTES
Emergency Department Encounter      NAME: Ramu Quigley  AGE: 82 year old male  YOB: 1940  MRN: [id number]  EVALUATION DATE & TIME: No admission date for patient encounter.    PCP: [facility]    ED PROVIDER: Fabio Bustillo M.D.      Chief Complaint   Patient presents with     bowel obstruction         FINAL IMPRESSION:  1. Small bowel obstruction (H)        MEDICAL DECISION MAKIN:12 PM I discussed the case with hospitalist, Dr Zacarias, who accepts the patient    7:20 PM I met with the patient, obtained history, performed an initial exam, and discussed options and plan for diagnostics and treatment here in the ED. PPE: Provider wore gloves, and paper mask.    7:49 PM Spoke with Dr. Higuera, General Surgery.     This patient is a 82-year-old male who presents with a bowel obstruction.  The patient says that he has been having vomiting since .  He said that his symptoms improved with Pepto-Bismol and that he did not eat much for the next 2 days.  He ate a banana today and the vomiting returned.  He says that his abdomen is distended since yesterday.  He had a small bowel movement yesterday but no bowel movement and he has not passing gas today.  He was seen at the urgency room where lab work and a CT were done.  The CT scan showed a high-grade small bowel obstruction.  I took the phone call from the emergency room doctor who was sending the patient to us.  I began the patient's work-up in the waiting room and spoke quickly with Dr. Shields and then later with Dr. Higuera.  The patient had brought a copy of the CT scan with them on a disc which had been on his chart.  I reviewed the patient's lab work and CT result on the patient's medical record.  Additional lab work was ordered.  An NG tube was ordered in the ER.  I have admitted the patient to the hospitalist service and we consulted Dr. Higuera because of the high-grade bowel obstruction.         Pertinent Labs & Imaging studies  reviewed. (See chart for details)    MEDICATIONS GIVEN IN THE EMERGENCY:  Medications   0.9% sodium chloride BOLUS (1,000 mLs Intravenous New Bag 7/8/22 2001)     Followed by   sodium chloride 0.9% infusion (has no administration in time range)   lidocaine 1 % 0.1-1 mL (has no administration in time range)   lidocaine (LMX4) cream (has no administration in time range)   sodium chloride (PF) 0.9% PF flush 3 mL (3 mLs Intracatheter Not Given 7/8/22 2040)   sodium chloride (PF) 0.9% PF flush 3 mL (has no administration in time range)   sodium chloride 0.9% infusion (has no administration in time range)   ondansetron (ZOFRAN ODT) ODT tab 4 mg (has no administration in time range)     Or   ondansetron (ZOFRAN) injection 4 mg (has no administration in time range)   pantoprazole (PROTONIX) IV push injection 40 mg (has no administration in time range)   HYDROmorphone (DILAUDID) injection 0.3 mg (has no administration in time range)   piperacillin-tazobactam (ZOSYN) 3.375 g vial to attach to  mL bag (has no administration in time range)     Followed by   piperacillin-tazobactam (ZOSYN) 3.375 g vial to attach to  mL bag (has no administration in time range)   phenol (CHLORASEPTIC) 1.4 % spray 1 mL (has no administration in time range)   hydrALAZINE (APRESOLINE) injection 10 mg (has no administration in time range)   thiamine (B-1) injection 100 mg (has no administration in time range)   folic acid injection 1 mg (has no administration in time range)       NEW PRESCRIPTIONS STARTED AT TODAY'S ER VISIT:  New Prescriptions    No medications on file          =================================================================    HPI    Patient information was obtained from: the patient and his wife    Use of : N/A        Ramu Billy Quigley is a 82 year old male with a past medical history of dementia, hypertension and COPD, who presents to the ED via walk in for evaluation of a bowel obstruction.     Per  Chart Review:   7/8/2022 the patient presented to [facility] for evaluation of vomiting. He was given 4 mg of Zofran. The patient's CT abdomen and pelvis showed a high grade, mid to distal small bowel obstruction with a small amount of ascites and mild mesenteric stranding. Exact point of transition was not clear but there were angulated small bowel loops in the mid abdomen at the pelvic inlet. He was referred to the ED for admission and surgical consultation.     The patient reports the onset of vomiting four days ago (7/4). He had about 15 episodes of vomiting on 7/4, and then took some pepto bismol, which provided relief. The patient did not have much to eat over the next two days, and today he ate a banana which started the vomiting again. His wife notes that when he has episodes of vomiting it begins very suddenly. The patient endorses abdominal distention which began yesterday (7/7). His last bowel movement was yesterday, and was very small. He denies passing any gas today. The patient denies abdominal pain, nausea, and any other symptoms or complaints at this time.     REVIEW OF SYSTEMS   Review of Systems   Gastrointestinal: Positive for abdominal distention and vomiting. Negative for abdominal pain and nausea.        Negative for passing gas   All other systems reviewed and are negative.       PAST MEDICAL HISTORY:  Past Medical History:   Diagnosis Date     Asthma      COPD (chronic obstructive pulmonary disease) (H)      Essential thrombocytopenia (H)      Graves disease      Hypertension      Hypothyroidism      Lumbar disc herniation      Testosterone deficiency        PAST SURGICAL HISTORY:  Past Surgical History:   Procedure Laterality Date     BACK SURGERY      lumbar discectomy     ENT SURGERY      sinus polypectomy     WEDGE RESECTION EYELID Left 11/4/2020    Procedure: LEFT LOWER LID WEDGE EXCISION WITH FROZEN SECTION CONTROL;  Surgeon: Beulah Ba MD;  Location:   OR       CURRENT MEDICATIONS:      Current Facility-Administered Medications:      folic acid injection 1 mg, 1 mg, Intravenous, Daily, Boby Zacarias DO     hydrALAZINE (APRESOLINE) injection 10 mg, 10 mg, Intravenous, Q6H PRN, Boby Zacarias DO     HYDROmorphone (DILAUDID) injection 0.3 mg, 0.3 mg, Intravenous, Q3H PRN, Boby Zacarias DO     lidocaine (LMX4) cream, , Topical, Q1H PRN, Boby Zacarias DO     lidocaine 1 % 0.1-1 mL, 0.1-1 mL, Other, Q1H PRN, Boby Zacarias DO     ondansetron (ZOFRAN ODT) ODT tab 4 mg, 4 mg, Oral, Q6H PRN **OR** ondansetron (ZOFRAN) injection 4 mg, 4 mg, Intravenous, Q6H PRN, Boby Zacarias DO     pantoprazole (PROTONIX) IV push injection 40 mg, 40 mg, Intravenous, Daily with breakfast, Boby Zacarias DO     phenol (CHLORASEPTIC) 1.4 % spray 1 mL, 1 spray, Mouth/Throat, Q1H PRN, Boby Zacarias DO     piperacillin-tazobactam (ZOSYN) 3.375 g vial to attach to  mL bag, 3.375 g, Intravenous, Once **FOLLOWED BY** [START ON 7/9/2022] piperacillin-tazobactam (ZOSYN) 3.375 g vial to attach to  mL bag, 3.375 g, Intravenous, Q8H, Boby Zacarias DO     sodium chloride (PF) 0.9% PF flush 3 mL, 3 mL, Intracatheter, Q8H, Boby Zacarias DO     sodium chloride (PF) 0.9% PF flush 3 mL, 3 mL, Intracatheter, q1 min prn, Boby Zacarias DO     [COMPLETED] 0.9% sodium chloride BOLUS, 1,000 mL, Intravenous, Once, Last Rate: 1,000 mL/hr at 07/08/22 2001, 1,000 mL at 07/08/22 2001 **FOLLOWED BY** sodium chloride 0.9% infusion, , Intravenous, Continuous, Fabio Bustillo MD     sodium chloride 0.9% infusion, , Intravenous, Continuous, Boby Zacarias DO     thiamine (B-1) injection 100 mg, 100 mg, Intravenous, Daily, Boby Zacarias DO    Current Outpatient Medications:      albuterol (PROAIR HFA/PROVENTIL HFA/VENTOLIN HFA) 108 (90 Base) MCG/ACT inhaler, Inhale 2 puffs into the lungs as needed for shortness of breath /  dyspnea or wheezing, Disp: , Rfl:      aspirin (ASA) 81 MG EC tablet, Take 81 mg by mouth daily, Disp: , Rfl:      cholecalciferol (DIALYVITE VITAMIN D 5000) 125 MCG (5000 UT) CAPS, Take 2 capsules (10,000 Units) by mouth daily, Disp: 90 capsule, Rfl: 3     ferrous sulfate (FEROSUL) 325 (65 Fe) MG tablet, Take 325 mg by mouth daily (with breakfast), Disp: , Rfl:      ketoconazole (NIZORAL A-D) 1 % shampoo, Externally apply topically as needed, Disp: , Rfl:      levothyroxine (SYNTHROID/LEVOTHROID) 100 MCG tablet, Take 100 mcg by mouth daily, Disp: , Rfl:      losartan (COZAAR) 100 MG tablet, Take 100 mg by mouth daily, Disp: , Rfl:      multivitamin w/minerals (THERA-VIT-M) tablet, Take 1 tablet by mouth, Disp: , Rfl:      vitamin C (ASCORBIC ACID) 1000 MG TABS, Take 1 tablet (1,000 mg) by mouth 2 times daily, Disp: 180 tablet, Rfl: 3    ALLERGIES:  Allergies   Allergen Reactions     Cats Itching     Dust Mites Itching     Mold Itching     Levaquin [Levofloxacin]      Ankle swelling       FAMILY HISTORY:  History reviewed. No pertinent family history.    SOCIAL HISTORY:   Social History     Socioeconomic History     Marital status:    Tobacco Use     Smoking status: Former Smoker     Smokeless tobacco: Never Used     Tobacco comment: quit over 40 yrs ago   Substance and Sexual Activity     Alcohol use: Yes     Comment: 3 drinks/week     Drug use: Never       PHYSICAL EXAM:    Vitals: BP (!) 160/96   Pulse 79   Temp 98.7  F (37.1  C) (Temporal)   Resp 16   Wt 80.7 kg (178 lb)   SpO2 92%   BMI 24.83 kg/m     Constitutional: Well developed, well nourished. Comfortable appearing.  HEAD:Normocephalic, atraumatic,   Eyes: PERRLA, EOM intact, conjunctiva clear, no discharge  ENT: mucous membranes moist, nose normal.   Neck- Supple, gross ROM intact.  No JVD.  No palpable nodes.  Pulmonary: Clear to auscultation bilaterally, no respiratory distress, no wheezing, speaks full sentences easily.  Chest: No chest  wall tenderness  Cardiovascular: Normal heart rate, regular rhythm, no murmurs. No lower extremity edema, 2+ DP pulses.   GI: Soft, mild diffuse tenderness to deep palpation, distended, no masses.  No hepatosplenomegaly. Absent bowel sounds.   Musculoskeletal: Moving all 4 extremities intentionally and without pain. No obvious deformity.  Back: No CVA tenderness  Skin: Warm, dry, no rash.  Neurologic: Alert & oriented x 3, speech clear, moving all extremities spontaneously   Psychiatric: Affect normal, cooperative.     LAB:  All pertinent labs reviewed and interpreted.  Labs Ordered and Resulted from Time of ED Arrival to Time of ED Departure   HEPATIC FUNCTION PANEL - Abnormal       Result Value    Bilirubin Total 0.9      Bilirubin Direct 0.3      Protein Total 7.6      Albumin 3.4 (*)     Alkaline Phosphatase 100      AST 22      ALT 11     CRP INFLAMMATION - Abnormal    CRP 5.5 (*)    LIPASE - Normal    Lipase 21     LACTIC ACID WHOLE BLOOD - Normal    Lactic Acid 1.4     COVID-19 VIRUS (CORONAVIRUS) BY PCR   ETHYL ALCOHOL LEVEL   CK TOTAL   VITAMIN B12   FOLATE   PROCALCITONIN   MAGNESIUM   PHOSPHORUS   UA MACROSCOPIC WITH REFLEX TO MICRO AND CULTURE   CK TOTAL   TSH WITH FREE T4 REFLEX   BLOOD CULTURE   BLOOD CULTURE       RADIOLOGY:  No orders to display       EKG:   None.     PROCEDURES:   Procedures   None.       I, Jackie Brown, am serving as a scribe to document services personally performed by Dr. Fabio Bustillo based on my observation and the provider's statements to me. I, Fabio Bustillo M.D. attest that Jackie Brown is acting in a scribe capacity, has observed my performance of the services and has documented them in accordance with my direction.      Fabio Bustillo M.D.  Emergency Medicine  Formerly [facility] EMERGENCY DEPARTMENT  [address] [place] [id number]  [phone]  Dept: [phone]      Fabio Bustillo MD  07/08/22 4658

## 2022-07-09 NOTE — PROGRESS NOTES
"   07/09/22 0940   Quick Adds   Type of Visit Initial Occupational Therapy Evaluation   Living Environment   People in Home spouse   Current Living Arrangements house   Home Accessibility stairs to enter home   Number of Stairs, Main Entrance 3   Stair Railings, Main Entrance none   Transportation Anticipated family or friend will provide   Living Environment Comments questionable hx due to confusion   Self-Care   Usual Activity Tolerance good   Current Activity Tolerance moderate   Regular Exercise Yes   Activity/Exercise Type walking  (golf)   Equipment Currently Used at Home none   Fall history within last six months no   Instrumental Activities of Daily Living (IADL)   IADL Comments indep.dressing, showers, laundry, sp. cooks, does cleaning duties together w/ spouse   General Information   Onset of Illness/Injury or Date of Surgery 07/08/22   Patient/Family Therapy Goal Statement (OT) none stated   Existing Precautions/Restrictions NPO  (NG tube)   Limitations/Impairments safety/cognitive   Cognitive Status Examination   Orientation Status person;place  (knew month, not year)   Cognitive Status Comments confusion, pt may be unreliable historian   Visual Perception   Visual Impairment/Limitations   (had glasses, wears \"half the time\")   Range of Motion Comprehensive   General Range of Motion no range of motion deficits identified   Strength Comprehensive (MMT)   General Manual Muscle Testing (MMT) Assessment no strength deficits identified   Coordination   Upper Extremity Coordination No deficits were identified   Bed Mobility   Comment (Bed Mobility) EOB sit indep.   Transfers   Transfers toilet transfer;bed-chair transfer   Transfer Skill: Bed to Chair/Chair to Bed   Bed-Chair [place] (Transfers) independent   Transfer Comments none   Toilet Transfer   Type (Toilet Transfer) sit-stand;stand-sit   [place] Level (Toilet Transfer) modified independence   Assistive Device (Toilet Transfer)   (none) "   Balance   Balance Assessment no deficits were identified   Balance Comments amb. in room SBA/I   Grooming Assessment/Training   [place] Level (Grooming) supervision   Comment, (Grooming) brushsed teeth   Clinical Impression   Criteria for Skilled Therapeutic Interventions Met (OT) Yes, treatment indicated   OT Diagnosis decreased ADLs   OT Problem List-Impairments impacting ADL cognition;mobility   Assessment of Occupational Performance 1-3 Performance Deficits   Identified Performance Deficits cognition w/ ADLs   Planned Therapy Interventions (OT) cognition   Clinical Decision Making Complexity (OT) moderate complexity   Anticipated Equipment Needs Upon Discharge (OT)   (cont. to assess)   Risk & Benefits of therapy have been explained evaluation/treatment results reviewed;care plan/treatment goals reviewed;risks/benefits reviewed;patient   OT Discharge Planning   OT Discharge Recommendation (DC Rec) home with assist   OT Rationale for DC Rec AX1 for ADLs and mobility, recommend supervision w/ ADLs for safety   Total Evaluation Time (Minutes)   Total Evaluation Time (Minutes) 18   OT Goals   Therapy Frequency (OT) Daily   OT Predicted Duration/Target Date for Goal Attainment 07/14/22   OT Goals Cognition   OT: Cognitive Patient/caregiver will verbalize understanding of cognitive assessment results/recommendations as needed for safe discharge planning

## 2022-07-09 NOTE — ANESTHESIA CARE TRANSFER NOTE
Patient: Ramu Quigley    Procedure: Procedure(s):  LAPAROTOMY  IRRIGATION AND DEBRIDEMENT, BILATERAL LOWER LEG ULCERS       Diagnosis: Small bowel obstruction (H) [K56.609]  Diagnosis Additional Information: No value filed.    Anesthesia Type:   General     Note:    Oropharynx: oropharynx clear of all foreign objects  Level of Consciousness: drowsy  Oxygen Supplementation: face mask  Level of Supplemental Oxygen (L/min / FiO2): 6  Independent Airway: airway patency satisfactory and stable  Dentition: dentition unchanged  Vital Signs Stable: post-procedure vital signs reviewed and stable  Report to RN Given: handoff report given  Patient transferred to: PACU    Handoff Report: Identifed the Patient, Identified the Reponsible Provider, Reviewed the pertinent medical history, Discussed the surgical course, Reviewed Intra-OP anesthesia mangement and issues during anesthesia, Set expectations for post-procedure period and Allowed opportunity for questions and acknowledgement of understanding      Vitals:  Vitals Value Taken Time   /82 07/09/22 1445   Temp 36.9  C (98.4  F) 07/09/22 1444   Pulse 71 07/09/22 1451   Resp 14 07/09/22 1451   SpO2 100 % 07/09/22 1451   Vitals shown include unvalidated device data.    Electronically Signed By: BRANDY Smith CRNA  July 9, 2022  2:53 PM

## 2022-07-09 NOTE — ANESTHESIA POSTPROCEDURE EVALUATION
Patient: Ramu Quigley    Procedure: Procedure(s):  LAPAROTOMY, REPAIR INCARCERATED SPIGELIAN HERNIA, LEFT  IRRIGATION AND DEBRIDEMENT, BILATERAL LOWER LEG ULCERS       Anesthesia Type:  General    Note:  Disposition: Inpatient   Postop Pain Control: Uneventful            Sign Out: Well controlled pain   PONV: No   Neuro/Psych: Uneventful            Sign Out: Acceptable/Baseline neuro status   Airway/Respiratory: Uneventful            Sign Out: Acceptable/Baseline resp. status   CV/Hemodynamics: Uneventful            Sign Out: Acceptable CV status; No obvious hypovolemia; No obvious fluid overload   Other NRE: NONE   DID A NON-ROUTINE EVENT OCCUR? No    Event details/Postop Comments:  No issues.             Last vitals:  Vitals Value Taken Time   /82 07/09/22 1545   Temp 36.9  C (98.4  F) 07/09/22 1530   Pulse 69 07/09/22 1545   Resp 16 07/09/22 1545   SpO2 96 % 07/09/22 1545       Electronically Signed By: Aidan Abad MD  July 9, 2022  4:59 PM

## 2022-07-09 NOTE — PROGRESS NOTES
Progress Note    Assessment/Plan  Small bowel obstruction:  - Surgery consult  - Continue NG, Gastrografin did not reach colon.  Nurse advised to check NG tube connection for optimal output  - N.p.o. but will allow meds  - Decrease IV fluids to 125 cc an hour     Possible community-acquired pneumonia: Incidental CT finding of air bronchogram and consolidation right middle lobe.  Reports mild cough.  No fever.  Procalcitonin is 0.1  Continue empirical Zosyn for 2 more days     NAVEEN on CKD stage III: Current creatinine 2.7.  Baseline creatinine appears to be 1.6-1.7.  Secondary to volume depletion due to problem #1.  - Improving with IV fluids but decrease rate to 125 cc an hour  - Strict intake and output and daily weights  - UA is unremarkable with negative nitrites and leukocyte esterase  - Avoid nephrotoxic medications  Creatinine is trending down  -Hold ARB     Acute hypercalcemia: Secondary to volume depletion.  Resolved with IV fluids  - Hold vitamin D and calcium supplement  - Calcium of 8.9 on 7/9    Hyponatremia due to hypovolemia  -- Improved with IV fluids        Chronic macrocytic anemia and thrombocytosis:  - Continue outpatient management  TSH, B12 is normal     Hypertension:  - Suboptimal control  - Hold oral meds for now  - IV hydralazine if needed     GERD: PPI     COPD: Not in acute exacerbation.  Not on chronic oxygen.  - Albuterol neb as needed     Hypothyroidism:  -  TSH   Date Value Ref Range Status   07/08/2022 0.49 0.30 - 5.00 uIU/mL Final     -  Ordered half the oral dose of levothyroxine as IV at 50 mcg daily.     Full code     Greater than 2 midnight stay         Barriers to discharge: Return of bowel function    Anticipated discharge date:        Subjective  Patient new to me.  Chart reviewed.  Denies passing gas.  NG tube in place.  No output from the NG tube.  Requested nurse to check the connection.  Gastrografin SBFT does not show dye in the colon.  Creatinine is down to 2.32.  Was 2.7  "as per surgery on admission.  Will decrease IV fluid rate to 125 an hour.  Patient denies any cough or fever or chills.  Sodium has improved to 139      Objective    BP (!) 175/87 (BP Location: Right arm)   Pulse 82   Temp 98.6  F (37  C) (Oral)   Resp 18   Ht 1.778 m (5' 10\")   Wt 80.7 kg (178 lb)   SpO2 96%   BMI 25.54 kg/m    Weight:   Wt Readings from Last 5 Encounters:   07/08/22 80.7 kg (178 lb)   03/03/21 77.8 kg (171 lb 9.6 oz)   11/04/20 76.1 kg (167 lb 12.8 oz)       I/O last 3 completed shifts:  In: 1113 [I.V.:963; NG/GT:150]  Out: 1900 [Urine:600; Emesis/NG output:1300]  I/O this shift:  In: -   Out: 900 [Emesis/NG output:900]          Physical Exam  Alert, oriented*3  Denies any abdominal pain  Body mass index is 25.54 kg/m .  NG tube in place  No sinus tenderness  Moist membranes  Neck supple  CVS: S1 S2-N, no murmurs, gallops, rubs  Resp: B/L vesicular breath sounds, no wheezing, crackles  Abd: Distended with no bowel sounds or  Neuro: no involuntary movements such as tremors  Vasc: no leg edema     Pertinent Labs  ----------------------  Recent Labs   Lab 07/09/22 0546 07/08/22 1955     --    POTASSIUM 4.1  --    CO2 23  --    BUN 55*  --    CR 2.32*  --    MAG  --  2.0   GLC 92  --    ALBUMIN 3.0* 3.4*   BILITOTAL 1.0 0.9   ALKPHOS 86 100   ALT 12 11   AST 20 22     Recent Labs   Lab 07/09/22  0546   WBC 11.2*   HGB 10.8*   HCT 34.5*   *     No results for input(s): INR in the last 168 hours.  Glucose Values Latest Ref Rng & Units 7/9/2022   Bedside Glucose (mg/dl )  - --   GLUCOSE 70 - 125 mg/dL 92   Some recent data might be hidden         Pertinent Radiology   Radiology Results: Personally reviewed impression/s  XR Abdomen 1 View    Result Date: 7/9/2022  EXAM: XR ABDOMEN 1 VIEW LOCATION: [facility] DATE/TIME: 7/9/2022 1:47 AM INDICATION: ng new placement COMPARISON: 07/09/2022 at 0009     IMPRESSION: Enteric tube terminates over the distal " gastric body in the abdominal midline. Gas-filled loops of bowel throughout the upper abdomen. Right basilar atelectasis.     XR Abdomen 1 View    Result Date: 7/9/2022  EXAM: XR ABDOMEN 1 VIEW LOCATION: [facility] DATE/TIME: 7/9/2022 12:09 AM INDICATION: ng placement COMPARISON: Prior CT obtained on 7/8/2022     IMPRESSION: Multiple air-filled loops of small and large bowel are seen in the upper abdomen. There is an NG tube in place with the tip and side-port seen within the stomach.     XR Gastrografin Challenge    Result Date: 7/9/2022  EXAM: XR GASTROGRAFIN CHALLENGE LOCATION: [facility] DATE/TIME: 07/09/2022, 10:20 AM INDICATION: Small bowel obstruction. COMPARISON: 07/08/2022. TECHNIQUE: Routine water soluble contrast follow-through challenge. FINDINGS: FLUOROSCOPIC TIME: 0 minutes. NUMBER OF IMAGES: 2. NG tube in the stomach. Gastrografin contrast in the stomach and dilated small bowel loops. No contrast in the colon.     IMPRESSION: Persistent small bowel obstruction.     EKG Results: not reviewed.

## 2022-07-09 NOTE — PROGRESS NOTES
No complaints.  Does not think he has had a bowel movement or is passing gas.  Afebrile, vital signs stable.    Originally a large amount out NG.  Has tolerated having NG clamped after getting Gastrografin.    Physical exam:  Pleasantly mildly confused.  Abdomen is still mildly distended but soft and nontender.    Follow-up KUB is pending after getting Gastrografin.    Impression: Small bowel obstruction.  Gastrografin challenge is pending at this time.  He has not started passing anything out of his bottom which is a little concerning that this really is an obstruction.  We will follow-up on his x-ray later today.   Pessary Insertion:  S: 76 y.o.  here for pessary insertion and cleaning  Pessary type: #6 ring.   Patient has not been seen since 2019.  She has had no issues with this device for the past year and has been having intercourse without pain.  On Saturday she did have intercourse with her .  On  she admits to some bloody discharge which self resolved.  She is not using vaginal Premarin as she forgets.    PE:  Vitals:    21 0832   BP: 126/59     Gen: AAO in NAD  SSE: Mild to moderate irritation of the vaginal mucosa more so on the right than the left.  There is a trace amount of light bleeding coming from this irritating area.  Cervix appears normal.  Pessary was removed (#6 ring) and examined.  It was cleaned and reinserted without difficulties.    AP:   Advised the patient to resume use of Premarin cream.  She is to use it nightly for 2 weeks then back off to 3 times weekly.    Pt states pessary was comfortable and there was no slipping of the device  Pessary was checked for placement and fit after several minutes of mild activity and noted to be in place.    Pt is to call for any pain, discharge or bleeding from the device.    We will have the patient follow-up in 3 months for continued surveillance of irritation.  Advised patient that she may need a pessary free time to heal the irritation if it persists.

## 2022-07-09 NOTE — CONSULTS
Consultation - Surgery  Ramu Hernandez,  1940, MRN [id number]    Admitting Dx: Small bowel obstruction (H) [K56.609]    PCP: [facility], [place], [phone]   Code status:  Full Code       Extended Emergency Contact Information  Primary Emergency Contact: ZACH HERNANDEZ  Address:  [place]           [address]  Mobile Phone: [phone]  Relation: Spouse       Assessment and Plan   Impression:  Small bowel obstruction.  The patient's had no previous abdominal surgeries so he really has no reason to have adhesions to cause an obstruction.  However he has been having symptoms on and off for 4 days already so that makes me worry a little bit more that this is real.  I think we should give him a trial of an NG and a Gastrografin challenge.  He is not having any pain so there is no indication of an urgent need for surgery.    He is also probably fairly dehydrated.  His creatinine is 2.7 and he had a normal creatinine about 2 years ago.  Needs to be hydrated.  Plan:  NG tube.  Gastrografin challenge.  We will follow with you.           Chief Complaint <principal problem not specified>       HPI    We have been requested by Dr. Bustillo to evaluate Ramu Hernandez for small bowel obstruction.  This is a 82 year old year old male who had significant nausea and vomiting through the day on Monday.  He then just really did not eat much over the last couple days until today he tried eating again and then threw up again.  His wife states that he has been drinking some over the last few days but not a ton.  He has had no previous abdominal surgeries.  He has had an inguinal hernia repair in the past.       Medical History  Patient Active Problem List   Diagnosis     Anemia     Asthma     Chronic cough     Edema of lower extremity     Essential hypertension     Essential thrombocytosis (H)     Essential thrombocythemia (H)     Gastrointestinal hemorrhage     Gastrointestinal hemorrhage  with melena     High frequency deafness     History of Graves' disease     Hypothyroidism (acquired)     Hypothyroidism following radioiodine therapy     Memory impairment     Moderate COPD (chronic obstructive pulmonary disease) (H)     Obstructive sleep apnea syndrome     Osteoporosis     Periodic limb movement disorder     Personal history of nicotine dependence     Personal history of other malignant neoplasm of skin     Pruritus     Testosterone deficiency     Non-pressure chronic ulcer of unspecified part of right lower leg limited to breakdown of skin (H)     Ulcer of right lower extremity with fat layer exposed (H)     Small bowel obstruction (H)       [unfilled] Surgical History  Past Surgical History:   Procedure Laterality Date     BACK SURGERY      lumbar discectomy     ENT SURGERY      sinus polypectomy     WEDGE RESECTION EYELID Left 11/4/2020    Procedure: LEFT LOWER LID WEDGE EXCISION WITH FROZEN SECTION CONTROL;  Surgeon: Beulah Ba MD;  Location:  OR        Social History  Social History     Tobacco Use     Smoking status: Former Smoker     Smokeless tobacco: Never Used     Tobacco comment: quit over 40 yrs ago   Substance Use Topics     Alcohol use: Yes     Comment: 3 drinks/week     Drug use: Never       Allergies  Allergies   Allergen Reactions     Cats Itching     Dust Mites Itching     Mold Itching     Levaquin [Levofloxacin]      Ankle swelling    Family History  Reviewed, and family history is not on file.  The Family history is not pertinent to the patients chief complaint. Psychosocial Needs  Social History     Social History Narrative     Not on file     Additional psychosocial needs reviewed per nursing assessment.     Prior to Admission Medications   Current Outpatient Medications   Medication Instructions     albuterol (PROAIR HFA/PROVENTIL HFA/VENTOLIN HFA) 108 (90 Base) MCG/ACT inhaler 2 puffs, Inhalation, PRN     aspirin (ASA) 81 mg, Oral, DAILY     Dialyvite Vitamin D  5000 10,000 Units, Oral, DAILY     ferrous sulfate (FEROSUL) 325 mg, Oral, DAILY WITH BREAKFAST     ketoconazole (NIZORAL A-D) 1 % shampoo Apply externally, PRN     levothyroxine (SYNTHROID/LEVOTHROID) 100 mcg, Oral, DAILY     losartan (COZAAR) 100 mg, Oral, DAILY     multivitamin w/minerals (THERA-VIT-M) tablet 1 tablet, Oral     vitamin C (ASCORBIC ACID) 1,000 mg, Oral, 2 TIMES DAILY           Review of Systems:  Pertinent items are noted in HPI. Physical Exam:  Temp:  [98.7  F (37.1  C)] 98.7  F (37.1  C)  Pulse:  [79-90] 79  Resp:  [16] 16  BP: (151-161)/(89-96) 160/96  SpO2:  [92 %-93 %] 92 %    General appearance: appears stated age and cooperative, mildly confused.  Asked sometimes inappropriate questions or gives inappropriate answers.  Eyes: no abnormalities detected  Lungs: clear to auscultation bilaterally  Heart: regular rate and rhythm  Abdomen: Moderately distended but still soft.  Nontender.  No palpable hernias.  Extremities: Dressings over the lower extremities just above the ankle.  Pulses: 2+ and symmetric  Skin: Skin color is healthy.  He has dressings on his left lower extremities.  Neurologic: Grossly normal       Pertinent Labs  Lab Results: personally reviewed.   Hemoglobin 12.9  White blood count 12.3    Creatinine 2.7     Pertinent Radiology  Radiology Results: Personally reviewed impression/s  EKG Results: not reviewed.

## 2022-07-09 NOTE — PLAN OF CARE
Admit to P4 from the ED.  Upon arrival to floor, NG placed with a large amount of thin brown gastric content return.  1200cc gastric content suctioned on Lis within 1 hour.  Requiring oxygen with NG in place, stated he had been a smoker.  IV fluids and IV Zosyn infusing here on the floor.  Pleasant and cooperative but forgetful.  Able to state situation, time and place but focused on his missing wallet (wife confirmed she had wallet.)  Denies pain.    Deborah Alva RN

## 2022-07-09 NOTE — PROGRESS NOTES
Surgery addendum:  Patient's follow-up x-ray shows most of the contrast is still in his stomach.  When we hooked up to suction the immediately got out 1100 cc.  At this point I feel he needs to be explored.  I have tried to contact his wife on both lines without success.  We will continue to try to get a hold of her before surgery.  At this point we are waiting for the OR to become available.  I have explained this to the patient and he seems to understand but with his memory issues I definitely want to discuss this with his wife I had a time also.  I do not see an alternative.

## 2022-07-09 NOTE — H&P
[facility] History and Physical       A/P    Small bowel obstruction:  - Surgery consult  - NG tube for decompression to low remittent suction  - N.p.o.  - IV fluids    Possible community-acquired pneumonia: Incidental CT finding of air bronchogram and consolidation right middle lobe.  Reports mild cough.  No fever.  - Check procalcitonin, blood culture x2  -Empiric Zosyn    NAVEEN on CKD stage III: Current creatinine 2.7.  Baseline creatinine appears to be 1.6-1.7.  Secondary to volume depletion due to problem #1.  - IV fluids as ordered  - Strict intake and output and daily weights  - Check UA  - Avoid nephrotoxic medications  - Check CK  - Labs in a.m.    Acute hypercalcemia: Secondary to volume depletion.  -IV fluids  - Hold vitamin D and calcium supplement  - Labs in a.m.    Chronic macrocytic anemia and thrombocytosis:  - Continue outpatient management  -Check TSH, B12 and folate, EtOH, LFTs    Hypertension:  - Await pharmacy to complete med rec  - Hold oral meds for now  - IV hydralazine if needed    GERD: PPI    COPD: Not in acute exacerbation.  Not on chronic oxygen.  - Albuterol neb as needed    Hypothyroidism:  - Check TSH  - Await pharmacy to complete med rec    Full code    Greater than 2 midnight stay    Spouse a bedside         Chief Complaint:     Nausea and vomiting     HPI:    82-year-old male with past medical history significant for COPD not on oxygen, chronic macrocytic anemia and thrombocytosis, hypertension, hypothyroidism who presented to the urgency room this afternoon due to nausea, vomiting and abdominal distention.  Patient first developed symptoms on July 4 of nausea and vomiting which are present for 2 days and then seem to alleviate.  Then starting yesterday redeveloped recurrent nausea and vomiting along with worsening abdominal distention and bloating.  Last bowel movement he believes was yesterday but does not recall.  He denies any melena or hematochezia.  No fevers.   Does report having mild nonproductive cough.  At the urgency room a CT abdomen and pelvis without contrast was obtained which demonstrated markedly dilated stomach and most of small bowel loops, pencil thin distal ileum and small bowel proximal to it slightly fecalized, angulated bowel loops in midabdomen and sacral promontory but exact point of transition unclear.  Mild stranding of small bowel mesentery and a very small amount of ascites adjacent to the liver, and pelvis and tiny right inguinal hernia.  No pneumatosis or portal venous air.  Colon collapsed with numerous distal colonic diverticula.  Small area of consolidation inferiorly and right middle lobe with air bronchograms.  WBC 12.3, hemoglobin 12.9, , platelet count 605.  Sodium 135, potassium 4.7, chloride 94, anion gap 14, glucose 124, calcium 10.5, BUN 61, creatinine 2.7.  Patient transferred to [facility] emergency department.  Patient now being admitted for further evaluation management.         Past Medical History:     Past Medical History:   Diagnosis Date     Asthma      COPD (chronic obstructive pulmonary disease) (H)      Essential thrombocytopenia (H)      Graves disease      Hypertension      Hypothyroidism      Lumbar disc herniation      Testosterone deficiency              Past Surgical History:      Past Surgical History:   Procedure Laterality Date     BACK SURGERY      lumbar discectomy     ENT SURGERY      sinus polypectomy     WEDGE RESECTION EYELID Left 11/4/2020    Procedure: LEFT LOWER LID WEDGE EXCISION WITH FROZEN SECTION CONTROL;  Surgeon: Beulah Ba MD;  Location:  OR             Social History:     Social History     Tobacco Use     Smoking status: Former Smoker     Smokeless tobacco: Never Used     Tobacco comment: quit over 40 yrs ago   Substance Use Topics     Alcohol use: Yes     Comment: 3 drinks/week             Family History:   History reviewed. No pertinent family history.  Family history  reviewed and updated in EPIC            Allergies:     Allergies   Allergen Reactions     Cats Itching     Dust Mites Itching     Mold Itching     Levaquin [Levofloxacin]      Ankle swelling             Medications:   reviewed         Review of Systems:     The Review of Systems is negative other than noted in the HPI      BP (!) 151/89   Pulse 90   Temp 98.7  F (37.1  C) (Temporal)   Resp 16   Wt 80.7 kg (178 lb)   SpO2 93%   BMI 24.83 kg/m     Physical Examination:   General appearance - alert, and in no distress  Eyes - pupils equal and reactive, extraocular eye movements intact, sclera anicteric  Mouth - mucous membranes pasty, pharynx normal without lesions  Lungs -decreased at bases, no wheezes, rales or rhonchi, symmetric air entry  Heart - normal rate, regular rhythm, normal S1, S2, no murmurs, rubs, clicks or gallops. No peripheral edema.  Abdomen -bowel sounds hypoactive, mild distention, no guarding or rebound, hiccuping during exam  Neurological - alert, oriented, normal speech, no focal findings or movement disorder noted  Skin - no c/c/p                Data:   Lab/imaging studies reviewed    ECG from admission personally reviewed.  NSR. No acute ischemic changes.          Boby Zacarias, DO, DO

## 2022-07-09 NOTE — PLAN OF CARE
Problem: Plan of Care - These are the overarching goals to be used throughout the patient stay.    Goal: Readiness for Transition of Care  Outcome: Ongoing, Not Progressing   Goal Outcome Evaluation:  Patient NG clamped fthis am for gastrographin Xray. Patient nausea started to get worse and abdomen distended. Called Xray to come early if possible. Xray done about 1030 and reconnected to suction after and had 900 ml out. Nausea and abdomen improved. Dr. Higuera here to see patient and stated he would be going to surgery when able. Wife arrived about 1200 and informed about patent status. To Surgery via cart with Shutter Guardiann running at 1245. Report given to Hodan MINOR.

## 2022-07-09 NOTE — PROGRESS NOTES
07/09/22 0850   Quick Adds   Type of Visit Initial PT Evaluation   Living Environment   People in Home spouse   Current Living Arrangements house   Home Accessibility stairs to enter home   Number of Stairs, Main Entrance 3   Stair Railings, Main Entrance none   Transportation Anticipated family or friend will provide   Living Environment Comments questionable history due to confusion,pt reports he drives   Self-Care   Usual Activity Tolerance good   Current Activity Tolerance moderate   Regular Exercise Yes   Activity/Exercise Type walking;other (see comments)  (golf)   Equipment Currently Used at Home none   Fall history within last six months no   General Information   Onset of Illness/Injury or Date of Surgery 07/08/22   Referring Physician Tomas Ta   Patient/Family Therapy Goals Statement (PT) return home   Pertinent History of Current Problem (include personal factors and/or comorbidities that impact the POC) SBO   Existing Precautions/Restrictions other (see comments)  (NG tube)   Weight-Bearing Status - LLE full weight-bearing   Weight-Bearing Status - RLE full weight-bearing   General Observations cooperative   Cognition   Affect/Mental Status (Cognition) confused   Orientation Status (Cognition) disoriented to;place;situation;time  (knew month only)   Pain Assessment   Patient Currently in Pain Yes, see Vital Sign flowsheet   Range of Motion (ROM)   Range of Motion ROM is WFL   Strength (Manual Muscle Testing)   Strength (Manual Muscle Testing) strength is WFL   Bed Mobility   Comment, (Bed Mobility) supine-sit Independent   Sit-Stand Transfer   Sit-Stand Bollinger (Transfers) independent   Assistive Device (Sit-Stand Transfers) other (see comments)  (none)   Stand-Sit Transfer   Stand-Sit Bollinger (Transfers) independent   Assistive Device (Stand-Sit Transfers)   (none)   Gait/Stairs (Locomotion)   Bollinger Level (Gait) supervision   Assistive Device (Gait) other (see comments)  (none)    Distance in Feet (Required for LE Total Joints) 30 feet   Pattern (Gait) step-through   Deviations/Abnormal Patterns (Gait) gait speed decreased   Maintains Weight-bearing Status (Gait) able to maintain   Comment, (Gait/Stairs) a little unsteady initially   Clinical Impression   Criteria for Skilled Therapeutic Intervention Evaluation only   Clinical Presentation (PT Evaluation Complexity) Stable/Uncomplicated   Clinical Presentation Rationale pt presents as med diagnosed   Clinical Decision Making (Complexity) low complexity   Planned Therapy Interventions (PT) gait training;stair training   Anticipated Equipment Needs at Discharge (PT)   (none)   Risk & Benefits of therapy have been explained evaluation/treatment results reviewed;patient   PT Discharge Planning   PT Discharge Recommendation (DC Rec) home with assist   PT Rationale for DC Rec patient moves well,lives with spouse who can assist as needed   Total Evaluation Time   Total Evaluation Time (Minutes) 15   Physical Therapy Goals   PT Frequency One time eval and treatment only   PT Predicted Duration/Target Date for Goal Attainment 07/09/22   PT Goals Bed Mobility;Transfers;Gait;Stairs   PT: Bed Mobility Independent;Supine to/from sit;Goal Met   PT: Transfers Independent;Sit to/from stand;Goal Met   PT: Gait Supervision/stand-by assist;Greater than 200 feet;Goal Met   PT: Stairs Supervision/stand-by assist;Modified independent;4 stairs;Rail on left;Goal Met  (4 steps with no rails)

## 2022-07-09 NOTE — ANESTHESIA PREPROCEDURE EVALUATION
Anesthesia Pre-Procedure Evaluation    Patient: Ramu Quigley   MRN: [id number] : 1940        Procedure : Procedure(s):  LAPAROTOMY          Past Medical History:   Diagnosis Date     Asthma      COPD (chronic obstructive pulmonary disease) (H)      Essential thrombocytopenia (H)      Graves disease      Hypertension      Hypothyroidism      Lumbar disc herniation      Testosterone deficiency       Past Surgical History:   Procedure Laterality Date     BACK SURGERY      lumbar discectomy     ENT SURGERY      sinus polypectomy     WEDGE RESECTION EYELID Left 2020    Procedure: LEFT LOWER LID WEDGE EXCISION WITH FROZEN SECTION CONTROL;  Surgeon: Beulah Ba MD;  Location: [facility] OR      Allergies   Allergen Reactions     Cats Itching     Dust Mites Itching     Mold Itching     Levaquin [Levofloxacin]      Ankle swelling      Social History     Tobacco Use     Smoking status: Former Smoker     Smokeless tobacco: Never Used     Tobacco comment: quit over 40 yrs ago   Substance Use Topics     Alcohol use: Yes     Comment: 3 drinks/week      Wt Readings from Last 1 Encounters:   22 80.7 kg (178 lb)        Anesthesia Evaluation            ROS/MED HX  ENT/Pulmonary:     (+) sleep apnea, asthma moderate,  COPD, recent URI,     Neurologic:  - neg neurologic ROS     Cardiovascular:     (+) hypertension-----    METS/Exercise Tolerance:     Hematologic:     (+) anemia,     Musculoskeletal:   (+) arthritis,     GI/Hepatic: Comment: sbo      Renal/Genitourinary:  - neg Renal ROS     Endo:     (+) thyroid problem,     Psychiatric/Substance Use:       Infectious Disease:       Malignancy:       Other:            Physical Exam    Airway        Mallampati: III   TM distance: > 3 FB   Neck ROM: full   Mouth opening: > 3 cm    Respiratory Devices and Support         Dental       (+) chipped      Cardiovascular   cardiovascular exam normal          Pulmonary   pulmonary exam normal            Other findings:  ngt    OUTSIDE LABS:  CBC:   Lab Results   Component Value Date    WBC 11.2 (H) 07/09/2022    HGB 10.8 (L) 07/09/2022    HCT 34.5 (L) 07/09/2022     (H) 07/09/2022     BMP:   Lab Results   Component Value Date     07/09/2022    POTASSIUM 4.1 07/09/2022    CHLORIDE 103 07/09/2022    CO2 23 07/09/2022    BUN 55 (H) 07/09/2022    CR 2.32 (H) 07/09/2022    GLC 92 07/09/2022     COAGS: No results found for: PTT, INR, FIBR  POC: No results found for: BGM, HCG, HCGS  HEPATIC:   Lab Results   Component Value Date    ALBUMIN 3.0 (L) 07/09/2022    PROTTOTAL 6.7 07/09/2022    ALT 12 07/09/2022    AST 20 07/09/2022    ALKPHOS 86 07/09/2022    BILITOTAL 1.0 07/09/2022     OTHER:   Lab Results   Component Value Date    LACT 1.4 07/08/2022    JOSUE 8.9 07/09/2022    PHOS 5.6 (H) 07/08/2022    MAG 2.0 07/08/2022    LIPASE 21 07/08/2022    TSH 0.49 07/08/2022    CRP 5.5 (H) 07/08/2022       Anesthesia Plan    ASA Status:  4, emergent    NPO Status:  NPO Appropriate    Anesthesia Type: General.     - Airway: ETT   Induction: Intravenous, RSI, Propofol.   Maintenance: Balanced.        Consents    Anesthesia Plan(s) and associated risks, benefits, and realistic alternatives discussed. Questions answered and patient/representative(s) expressed understanding.     - Discussed: Risks, Benefits and Alternatives for BOTH SEDATION and the PROCEDURE were discussed     - Discussed with:  Patient      - Extended Intubation/Ventilatory Support Discussed: No.      - Patient is DNR/DNI Status: No    Use of blood products discussed: No .     Postoperative Care    Pain management: IV analgesics, Multi-modal analgesia.   PONV prophylaxis: Ondansetron (or other 5HT-3), Dexamethasone or Solumedrol     Comments:    Other Comments: GETA    RSI with succ    Ponv ppx            Aidan Abad MD

## 2022-07-09 NOTE — ED NOTES
[facility] ED Handoff Report    ED Chief Complaint: vomiting    ED Diagnosis:  (K56.609) Small bowel obstruction (H)  Comment: bolus infusing  Plan: NG placement, admit       PMH:    Past Medical History:   Diagnosis Date     Asthma      COPD (chronic obstructive pulmonary disease) (H)      Essential thrombocytopenia (H)      Graves disease      Hypertension      Hypothyroidism      Lumbar disc herniation      Testosterone deficiency         Code Status:  Full Code     Falls Risk: No Band: Not applicable    Current Living Situation/Residence: lives with a significant other     Elimination Status: Continent: Yes     Activity Level: Independent    Patients Preferred Language:  English     Needed: No    Vital Signs:  BP (!) 161/96   Pulse 90   Temp 98.7  F (37.1  C) (Temporal)   Resp 16   Wt 80.7 kg (178 lb)   SpO2 93%   BMI 24.83 kg/m       Cardiac Rhythm: n/a    Pain Score: 0/10    Is the Patient Confused:  No-- Sundowns    Last Food or Drink: 07/08/22 at PTA    Focused Assessment:  Denies abdominal pain at this time.     Tests Performed: Done: Labs and Imaging    Treatments Provided:  fluids    Family Dynamics/Concerns: No    Family Updated On Visitor Policy: Yes    Plan of Care Communicated to Family: Yes    Who Was Updated about Plan of Care: wife at bedside    Belongings Checklist Done and Signed by Patient: No    Medications sent with patient: none    Covid: asymptomatic , in process    Additional Information: alert and oriented, able to make needs known.     UPDATE:  Wife states patient has some confusion and does sun down. Some confusion noted as shift went on.    Unable to collect second blood culture and so did not start antibiotic yet. It is hanging along with new liter of fluid that can be started once second culture is collected. Lab was notified and will be coming to 420 to collect lab.     Have not started NG. Did not have correct size in ED and patient needed to be transported  as room upstairs was clean.     UA has yet to be collected.     RN: Oksana Alva RN   7/8/2022 8:34 PM

## 2022-07-09 NOTE — PLAN OF CARE
"  Problem: Plan of Care - These are the overarching goals to be used throughout the patient stay.    Goal: Plan of Care Review/Shift Note  Description: The Plan of Care Review/Shift note should be completed every shift.  The Outcome Evaluation is a brief statement about your assessment that the patient is improving, declining, or no change.  This information will be displayed automatically on your shift note.  Outcome: Ongoing, Progressing  Goal: Patient-Specific Goal (Individualized)  Description: You can add care plan individualizations to a care plan. Examples of Individualization might be:  \"Parent requests to be called daily at 9am for status\", \"I have a hard time hearing out of my right ear\", or \"Do not touch me to wake me up as it startles me\".  Outcome: Ongoing, Progressing  Goal: Absence of Hospital-Acquired Illness or Injury  Outcome: Ongoing, Progressing  Intervention: Identify and Manage Fall Risk  Recent Flowsheet Documentation  Taken 7/9/2022 0324 by Brianna Castillo RN  Safety Promotion/Fall Prevention:   bed alarm on   assistive device/personal items within reach   fall prevention program maintained   increased rounding and observation   lighting adjusted   patient and family education   room door open   room near nurse's station   safety round/check completed  Taken 7/9/2022 0000 by Brianna Castillo RN  Safety Promotion/Fall Prevention:   bed alarm on   assistive device/personal items within reach   fall prevention program maintained   increased rounding and observation   lighting adjusted   patient and family education   room door open   room near nurse's station   safety round/check completed  Intervention: Prevent Skin Injury  Recent Flowsheet Documentation  Taken 7/9/2022 0324 by Brianna Castillo RN  Body Position: position changed independently  Taken 7/9/2022 0000 by Brianna Castillo RN  Body Position: position changed independently  Intervention: Prevent and Manage VTE (Venous " Thromboembolism) Risk  Recent Flowsheet Documentation  Taken 7/9/2022 0324 by Brianna Castillo, RN  Activity Management: activity adjusted per tolerance  Taken 7/9/2022 0000 by Brianna Castillo, RN  Activity Management: activity adjusted per tolerance  Goal: Optimal Comfort and Wellbeing  Outcome: Ongoing, Progressing  Goal: Readiness for Transition of Care  Outcome: Ongoing, Progressing     Problem: Risk for Delirium  Goal: Optimal Coping  Outcome: Ongoing, Progressing  Goal: Improved Behavioral Control  Outcome: Ongoing, Progressing  Goal: Improved Attention and Thought Clarity  Outcome: Ongoing, Progressing  Goal: Improved Sleep  Outcome: Ongoing, Progressing     Problem: Fluid Deficit (Intestinal Obstruction)  Goal: Fluid Balance  Outcome: Ongoing, Progressing     Problem: Infection (Intestinal Obstruction)  Goal: Absence of Infection Signs and Symptoms  Outcome: Ongoing, Progressing     Problem: Nausea and Vomiting (Intestinal Obstruction)  Goal: Nausea and Vomiting Relief  Outcome: Ongoing, Progressing     Problem: Pain (Intestinal Obstruction)  Goal: Acceptable Pain Control  Outcome: Ongoing, Progressing   Goal Outcome Evaluation:        Pt alert / confused with fluctuating mentation. After NG replaced, pt given gastrograffin at 0230. Pt NG has been clamped since. Pt denies pain. Pt frequently reoriented on NG. Pt to have xray between 10:30 and 12:30 today. Will monitor.

## 2022-07-09 NOTE — ANESTHESIA PROCEDURE NOTES
Airway       Patient location during procedure: OR       Procedure Start/Stop Times: 7/9/2022 1:31 PM  Staff -        CRNA: Lucia Acuna APRN CRNA       Performed By: CRNA  Consent for Airway        Urgency: elective  Indications and Patient Condition       Indications for airway management: doroteo-procedural       Induction type:intravenous       Mask difficulty assessment: 0 - not attempted    Final Airway Details       Final airway type: endotracheal airway       Successful airway: ETT - single and Oral  Endotracheal Airway Details        ETT size (mm): 8.0       Cuffed: yes       Cuff volume (mL): 10       Successful intubation technique: direct laryngoscopy       DL Blade Type: Umanzor 2       Grade View of Cords: 1       Adjucts: stylet and tooth guard       Position: Right       Measured from: lips       Secured at (cm): 21    Post intubation assessment        Placement verified by: capnometry, equal breath sounds and chest rise        Number of attempts at approach: 1       Secured with: silk tape       Ease of procedure: easy       Dentition: Intact and Unchanged    Medication(s) Administered   Medication Administration Time: 7/9/2022 1:31 PM

## 2022-07-09 NOTE — OP NOTE
Operative Note    Name:  Ramu REIS Dann  PCP:  Acacia Cecil  Procedure Date:  7/9/2022       Procedure:  Procedure(s):  Repair of Left Incarcerated Spigelian Hernia  IRRIGATION AND DEBRIDEMENT, BILATERAL LOWER LEG ULCERS     Pre-Procedure Diagnosis:  Small bowel obstruction (H) [K56.609]     Post-Procedure Diagnosis:    Incarcerated left Spigelian Hernia  Bilateral venous stasis ulcers    Surgeon(s):  Nuha Higuera MD Ogren, Diane S, MD     Assistant: None        Anesthesia Type:  General       Findings:  Knuckle of bowel stuck in a spit Tyrone hernia on the left causing an obstruction.  Bowel was viable.  Bilateral venous stasis ulcers with some necrotic tissue on the left lower leg.    Operative Report:    Just prior to bringing the patient back to the operating suite the radiologist called and reread the CT scan that was done at an outside facility yesterday and then made those images available to me and we reviewed them together.  He had a clear-cut spit Tyrone hernia in the left lower quadrant with a knuckle of bowel stuck in it.  Also he was noted to have bilateral venous stasis ulcers and I discussed with the family taking a look at them in the operating room.  The patient was taken to the operating suite where he was placed in the supine position.  General anesthesia was administered.  He was prepped and draped in a sterile fashion.  A small oblique incision was made in the left lower quadrant over a very subtle palpable fullness.  This is carried down through the subcutaneous tissues and then I incised the external oblique muscle and underneath here was a clear-cut hernia sac.  There is actually some necrotic fatty tissue along the actual hernia sac and that was excised then revealing a knuckle of small bowel up in this.  This was easily reduced.  I opened the hernia somewhat larger just so I could clearly get a good look at the small bowel.  Once releasing this the area that was trapped in  there immediately pinked up and contents started going from very dilated loops of proximal small bowel down to the distal small bowel.  I then closed the transversalis fascia with a running #1 PDS suture.  A piece of Prolene mesh was then placed between the transversalis and external oblique which was then also closed with a running #1 PDS suture.  The subcutaneous tissues were irrigated and closed with 3-0 Vicryl.  The skin was closed with a running subcuticular stitch of 4 Monocryl.  Sterile dressings were placed.  I also infiltrated the surrounding area with quarter percent Marcaine for a total of 50 cc.  Attention was then turned to his legs.  On his right leg laterally just above the lateral malleolus was a ulcer measuring approximately 2 x 5 cm.  This was debrided of fibrinous exudate with a 15 blade knife.  This was not an excisional debridement as there was no necrotic tissue it was just fibrinous exudate that was removed.  On the left lower extremity again laterally just above the lateral malleolus was a much larger ulcer.  For the most part clean.  This ulcer measured 5 x 10 cm.  I did an excisional debridement with a 15 blade knife removing necrotic skin subcutaneous tissue and some minimal fascia primarily from the edges of the wound.  There is also fibrinous exudate at the base of the wound that was debrided away.  For the most part the wound was very clean there is just this necrotic tissue along the edges of this fairly large wound.  Again this was an excisional debridement to debride away the necrotic tissue from the edges.  The wounds were cleansed with Betadine.  I applied bacitracin and then wrapped them with sterile gauze and Ace bandages.  The patient tolerated the procedure well.    Estimated Blood Loss:   10 cc        Specimens:    ID Type Source Tests Collected by Time Destination   1 : HERNIA SAC Tissue Hernia Sac SURGICAL PATHOLOGY EXAM Nuha Higuera MD 7/9/2022  2:15 PM             Drains:   NG/OG/NJ Tube Nasogastric Right nostril replaced after pt removed. (Active)   Site Description WDL 07/09/22 1100   Status Clamped 07/09/22 1215   Drainage Appearance Brown 07/09/22 1100   Placement Confirmation Osborn unchanged 07/09/22 1215   Osborn (cm marking) at nare/mouth 65 cm 07/09/22 0120   Intake (ml) 150 ml 07/09/22 0230   Output (ml) 200 ml 07/09/22 1215       Complications:    None    Nuha Higuera MD     Date: 7/9/2022  Time: 2:44 PM

## 2022-07-09 NOTE — PHARMACY-ADMISSION MEDICATION HISTORY
Pharmacy Note - Admission Medication History    Pertinent Provider Information: Patient takes care of own medications and not the best historian, does not take medications consistently  -Med list is mainly based off of fill history and recent clinic notes      ______________________________________________________________________    Prior To Admission (PTA) med list completed and updated in EMR.       PTA Med List   Medication Sig Last Dose     albuterol (PROAIR HFA/PROVENTIL HFA/VENTOLIN HFA) 108 (90 Base) MCG/ACT inhaler Inhale 2 puffs into the lungs as needed for shortness of breath / dyspnea or wheezing Past Month at Unknown time     aspirin (ASA) 81 MG EC tablet Take 81 mg by mouth daily Past Week at Unknown time     cholecalciferol (DIALYVITE VITAMIN D 5000) 125 MCG (5000 UT) CAPS Take 2 capsules (10,000 Units) by mouth daily Past Month at Unknown time     ferrous sulfate (FEROSUL) 325 (65 Fe) MG tablet Take 325 mg by mouth daily (with breakfast) Past Week at Unknown time     ketoconazole (NIZORAL A-D) 1 % shampoo Externally apply topically as needed Past Month at Unknown time     levothyroxine (SYNTHROID/LEVOTHROID) 100 MCG tablet Take 100 mcg by mouth daily Past Week at Unknown time     losartan (COZAAR) 100 MG tablet Take 100 mg by mouth daily Past Week at Unknown time     multivitamin w/minerals (THERA-VIT-M) tablet Take 1 tablet by mouth Past Month at Unknown time     vitamin C (ASCORBIC ACID) 1000 MG TABS Take 1 tablet (1,000 mg) by mouth 2 times daily Past Month at Unknown time       Information source(s): Patient, Family member, Clinic records and [facility]  Method of interview communication: in-person    Summary of Changes to PTA Med List  New: aspirin, iron  Discontinued: busulan, hydroxyzine, folic acid, omeprazole, pantoprazole incruse inhaler   Changed: losartan     Patient was asked about OTC/herbal products specifically.  PTA med list reflects this.    In the past week, patient  estimated taking medication this percent of the time:  50-90% due to memory .    Allergies were reviewed, assessed, and updated with the patient.      Patient did not bring any medications to the hospital and can't retrieve from home. No multi-dose medications are available for use during hospital stay.     The information provided in this note is only as accurate as the sources available at the time of the update(s).    Thank you for the opportunity to participate in the care of this patient.    JOSE PERALTA RPH  7/8/2022 8:13 PM

## 2022-07-10 VITALS
OXYGEN SATURATION: 94 % | RESPIRATION RATE: 18 BRPM | TEMPERATURE: 97.8 F | HEIGHT: 70 IN | HEART RATE: 76 BPM | BODY MASS INDEX: 23.62 KG/M2 | DIASTOLIC BLOOD PRESSURE: 64 MMHG | WEIGHT: 165 LBS | SYSTOLIC BLOOD PRESSURE: 123 MMHG

## 2022-07-10 LAB
ANION GAP SERPL CALCULATED.3IONS-SCNC: 9 MMOL/L (ref 5–18)
BASOPHILS # BLD AUTO: 0 10E3/UL (ref 0–0.2)
BASOPHILS NFR BLD AUTO: 0 %
BUN SERPL-MCNC: 45 MG/DL (ref 8–28)
CALCIUM SERPL-MCNC: 7.9 MG/DL (ref 8.5–10.5)
CHLORIDE BLD-SCNC: 110 MMOL/L (ref 98–107)
CO2 SERPL-SCNC: 24 MMOL/L (ref 22–31)
CREAT SERPL-MCNC: 1.81 MG/DL (ref 0.7–1.3)
EOSINOPHIL # BLD AUTO: 0 10E3/UL (ref 0–0.7)
EOSINOPHIL NFR BLD AUTO: 0 %
ERYTHROCYTE [DISTWIDTH] IN BLOOD BY AUTOMATED COUNT: 13.6 % (ref 10–15)
GFR SERPL CREATININE-BSD FRML MDRD: 37 ML/MIN/1.73M2
GLUCOSE BLD-MCNC: 93 MG/DL (ref 70–125)
GLUCOSE BLDC GLUCOMTR-MCNC: 77 MG/DL (ref 70–99)
GLUCOSE BLDC GLUCOMTR-MCNC: 79 MG/DL (ref 70–99)
HCT VFR BLD AUTO: 33.6 % (ref 40–53)
HGB BLD-MCNC: 10.2 G/DL (ref 13.3–17.7)
IMM GRANULOCYTES # BLD: 0 10E3/UL
IMM GRANULOCYTES NFR BLD: 0 %
LYMPHOCYTES # BLD AUTO: 0.4 10E3/UL (ref 0.8–5.3)
LYMPHOCYTES NFR BLD AUTO: 4 %
MCH RBC QN AUTO: 31.4 PG (ref 26.5–33)
MCHC RBC AUTO-ENTMCNC: 30.4 G/DL (ref 31.5–36.5)
MCV RBC AUTO: 103 FL (ref 78–100)
MONOCYTES # BLD AUTO: 0.8 10E3/UL (ref 0–1.3)
MONOCYTES NFR BLD AUTO: 8 %
NEUTROPHILS # BLD AUTO: 9.7 10E3/UL (ref 1.6–8.3)
NEUTROPHILS NFR BLD AUTO: 88 %
NRBC # BLD AUTO: 0 10E3/UL
NRBC BLD AUTO-RTO: 0 /100
PLATELET # BLD AUTO: 586 10E3/UL (ref 150–450)
POTASSIUM BLD-SCNC: 4.3 MMOL/L (ref 3.5–5)
RBC # BLD AUTO: 3.25 10E6/UL (ref 4.4–5.9)
SODIUM SERPL-SCNC: 143 MMOL/L (ref 136–145)
WBC # BLD AUTO: 10.9 10E3/UL (ref 4–11)

## 2022-07-10 PROCEDURE — C9113 INJ PANTOPRAZOLE SODIUM, VIA: HCPCS | Performed by: SPECIALIST

## 2022-07-10 PROCEDURE — 258N000003 HC RX IP 258 OP 636: Performed by: SPECIALIST

## 2022-07-10 PROCEDURE — 999N000127 HC STATISTIC PERIPHERAL IV START W US GUIDANCE

## 2022-07-10 PROCEDURE — 80048 BASIC METABOLIC PNL TOTAL CA: CPT | Performed by: SPECIALIST

## 2022-07-10 PROCEDURE — 250N000009 HC RX 250: Performed by: SPECIALIST

## 2022-07-10 PROCEDURE — 85025 COMPLETE CBC W/AUTO DIFF WBC: CPT | Performed by: SPECIALIST

## 2022-07-10 PROCEDURE — 999N000285 HC STATISTIC VASC ACCESS LAB DRAW WITH PIV START

## 2022-07-10 PROCEDURE — 250N000013 HC RX MED GY IP 250 OP 250 PS 637: Performed by: SPECIALIST

## 2022-07-10 PROCEDURE — 250N000011 HC RX IP 250 OP 636: Performed by: SPECIALIST

## 2022-07-10 PROCEDURE — 99207 PR NO BILLABLE SERVICE THIS VISIT: CPT | Performed by: INTERNAL MEDICINE

## 2022-07-10 PROCEDURE — 36415 COLL VENOUS BLD VENIPUNCTURE: CPT | Performed by: SPECIALIST

## 2022-07-10 RX ADMIN — SODIUM CHLORIDE: 9 INJECTION, SOLUTION INTRAVENOUS at 02:08

## 2022-07-10 RX ADMIN — FOLIC ACID 1 MG: 5 INJECTION, SOLUTION INTRAMUSCULAR; INTRAVENOUS; SUBCUTANEOUS at 08:23

## 2022-07-10 RX ADMIN — PIPERACILLIN AND TAZOBACTAM 3.38 G: 3; .375 INJECTION, POWDER, LYOPHILIZED, FOR SOLUTION INTRAVENOUS at 02:24

## 2022-07-10 RX ADMIN — LEVOTHYROXINE SODIUM ANHYDROUS 50 MCG: 100 INJECTION, POWDER, LYOPHILIZED, FOR SOLUTION INTRAVENOUS at 08:25

## 2022-07-10 RX ADMIN — PIPERACILLIN AND TAZOBACTAM 3.38 G: 3; .375 INJECTION, POWDER, LYOPHILIZED, FOR SOLUTION INTRAVENOUS at 10:18

## 2022-07-10 RX ADMIN — POLYETHYLENE GLYCOL 3350 17 G: 17 POWDER, FOR SOLUTION ORAL at 08:24

## 2022-07-10 RX ADMIN — ACETAMINOPHEN 975 MG: 325 TABLET ORAL at 10:17

## 2022-07-10 RX ADMIN — PANTOPRAZOLE SODIUM 40 MG: 40 INJECTION, POWDER, FOR SOLUTION INTRAVENOUS at 08:23

## 2022-07-10 RX ADMIN — SENNOSIDES AND DOCUSATE SODIUM 1 TABLET: 50; 8.6 TABLET ORAL at 08:24

## 2022-07-10 RX ADMIN — THIAMINE HYDROCHLORIDE 100 MG: 100 INJECTION, SOLUTION INTRAMUSCULAR; INTRAVENOUS at 08:23

## 2022-07-10 ASSESSMENT — ACTIVITIES OF DAILY LIVING (ADL)
ADLS_ACUITY_SCORE: 25

## 2022-07-10 NOTE — PLAN OF CARE
Occupational Therapy Discharge Summary    Reason for therapy discharge:    Discharged to home.    Progress towards therapy goal(s). See goals on Care Plan in [facility] electronic health record for goal details.  Goals not met.  Barriers to achieving goals:   discharge from facility.    Therapy recommendation(s):    No further therapy is recommended.    Goal Outcome Evaluation:                 Mary Reyes, OTR/RUBY  7/10/2022

## 2022-07-10 NOTE — PROGRESS NOTES
Care Management Discharge Note    Discharge Date: 07/10/2022       Discharge Disposition: Home    Discharge Services:  none    Discharge DME:  none    Discharge Transportation: family or friend will provide    Private pay costs discussed: Not applicable    Education Provided on the Discharge Plan:  yes  Persons Notified of Discharge Plans: yes  Patient/Family in Agreement with the Plan:  yes    Handoff Referral Completed: Yes    Additional Information:  SWCM met with Pt in room to discuss discharge planning.  Pt resides in [place] [facility] with his wife.  Pt is retired; worked as business .  Pt independent at baseline.  Pt has no discharge needs and no discharge recommendations.  Pt has friend to transport at discharge.    Pt discharged home this afternoon w/friend transporting.  No discharge needs.        CHAU Schilling

## 2022-07-10 NOTE — PROGRESS NOTES
No complaints.  Feels well.  Passing gas.  Afebrile, vital signs stable.    Physical exam:  Abdomen is soft, flat, nontender.  Dressing is dry and in place.  Bowel sounds are active.    Laboratories:  Creatinine improved at 1.8  White blood count 10.9  Hemoglobin 10.2, stable    Impression: Postop day #1 repair of incarcerated spigelian hernia.  Doing well.  We will DC his NG and start his diet.  I think there is even a chance he could go home today.  I have discussed his leg ulcers with his wife.  She states these are chronic and likely the biggest problem is that he has refused to believe that he needs compression socks.  He has been seen at the [facility] and multiple other clinics who have all told him the same thing and he refuses to do what has been told.  So these are likely not going to change anytime soon.  I did tell her that we can get her set up at our vascular/wound care center for follow-up down the line if she is interested.

## 2022-07-10 NOTE — DISCHARGE SUMMARY
Physician Discharge Summary    Primary Care Physician:  [facility] [place]    Discharge Provider: Nuha Higuera MD     Admission Date: 7/8/2022    Discharge Date: July 10, 2022     Admission Diagnoses: Small bowel obstruction (H) [K56.609]     Disposition: No follow-ups on file.  Condition at Discharge: Stable       Principal Diagnosis: Small bowel obstruction secondary to spigelian hernia  Discharge Diagnoses:  Active Problems:    Small bowel obstruction (H)  Chronic venous stasis ulcers    Procedures: Repair of incarcerated spigelian hernia and debridement of chronic venous stasis ulcers    Hospital Summary: The patient was admitted the hospital via the emergency room with a diagnosis of a small bowel obstruction.  On relook of his CT scan he had a very clear-cut spigelian hernia.  This was repaired.  I also debrided the wounds on his legs at the same time.  These are chronic and he has refused to do what was instructed at both the [facility] and another wound care center for those wounds.  He made a very quick recovery from the hernia repair and was ready for discharge home on the first postoperative day tolerating a regular diet.    Discharge Medications:   Usual home medications.        Discharge Instructions:  Follow up appointment with Primary Care Physician: [facility] [place] pr[facility]  Follow up appointment with Specialist: Dr. Higuera or Associates in 1 week.  Also referral made to the [facility] vascular and wound care clinic at [place].  Diet: Regular  Activity: No restrictions  Wound / drain care: As instructed on his abdomen.  For leg wounds he can do what he has been doing.

## 2022-07-10 NOTE — PLAN OF CARE
Goal Outcome Evaluation:      Problem: Fluid Deficit (Intestinal Obstruction)  Goal: Fluid Balance  Outcome: Ongoing, Progressing     Problem: Nausea and Vomiting (Intestinal Obstruction)  Goal: Nausea and Vomiting Relief  Outcome: Ongoing, Progressing     Problem: Pain (Intestinal Obstruction)  Goal: Acceptable Pain Control  Outcome: Ongoing, Progressing     Problem: Pain (Intestinal Obstruction)  Goal: Acceptable Pain Control  Outcome: Ongoing, Progressing     Pt is comfortable and sleeping good this night. NG tube draining well on low intermittent suction. Denies pain. NG drainage is 200 ml, brown- greenish. IV line to left arm was pulled out by pt, new IV line placed on rt hand.

## 2022-07-10 NOTE — PLAN OF CARE
Goal Outcome Evaluation:      Pt. To discharge home today, discharge paperwork discussed with pt. And able to voice any questions, denies pain, clinic will call pt. To make follow up apt. Family will transport pt. Pt. Left floor at 1400.

## 2022-07-11 ENCOUNTER — PATIENT OUTREACH (OUTPATIENT)
Dept: CARE COORDINATION | Facility: CLINIC | Age: 82
End: 2022-07-11

## 2022-07-11 DIAGNOSIS — Z71.89 OTHER SPECIFIED COUNSELING: ICD-10-CM

## 2022-07-11 NOTE — PROGRESS NOTES
Clinic Care Coordination Contact  [facility] [place]/Voicemail       Clinical Data: Care Coordinator Outreach  Reason for referral: TCM outreach  Outreach attempted x 1.  Left message on patient's voicemail with main [facility] phone number to speak with nurse advisors or scheduling department.   Plan:  Care Coordinator will try to reach patient again in 1-2 business days.       Estee Lopez  Community Health Worker  [facility] [place], [facility]  Ph: [phone]

## 2022-07-12 LAB
PATH REPORT.COMMENTS IMP SPEC: NORMAL
PATH REPORT.COMMENTS IMP SPEC: NORMAL
PATH REPORT.FINAL DX SPEC: NORMAL
PATH REPORT.GROSS SPEC: NORMAL
PATH REPORT.MICROSCOPIC SPEC OTHER STN: NORMAL
PATH REPORT.RELEVANT HX SPEC: NORMAL
PHOTO IMAGE: NORMAL

## 2022-07-12 PROCEDURE — 88302 TISSUE EXAM BY PATHOLOGIST: CPT | Mod: 26 | Performed by: PATHOLOGY

## 2022-07-12 NOTE — PROGRESS NOTES
Clinic Care Coordination Contact  [facility]: Post-Discharge Note  SITUATION                                                      Admission:    Admission Date: 07/08/22   Reason for Admission: Small bowel obstruction secondary to spigelian hernia  Discharge:   Discharge Date: 07/10/22  Discharge Diagnosis: Small bowel obstruction (H),  Chronic venous stasis ulcers    BACKGROUND                                                      Per hospital discharge summary and inpatient provider notes:    Ramu Quigley is a 82-year-old male with past medical history significant for COPD not on oxygen, chronic macrocytic anemia and thrombocytosis, hypertension, hypothyroidism who presented to the urgency room this afternoon due to nausea, vomiting and abdominal distention.  Patient first developed symptoms on July 4 of nausea and vomiting which are present for 2 days and then seem to alleviate.  Then starting yesterday redeveloped recurrent nausea and vomiting along with worsening abdominal distention and bloating.  Last bowel movement he believes was yesterday but does not recall.  He denies any melena or hematochezia.  No fevers.  Does report having mild nonproductive cough.  At the urgency room a CT abdomen and pelvis without contrast was obtained which demonstrated markedly dilated stomach and most of small bowel loops, pencil thin distal ileum and small bowel proximal to it slightly fecalized, angulated bowel loops in midabdomen and sacral promontory but exact point of transition unclear.  Mild stranding of small bowel mesentery and a very small amount of ascites adjacent to the liver, and pelvis and tiny right inguinal hernia.  No pneumatosis or portal venous air.  Colon collapsed with numerous distal colonic diverticula.  Small area of consolidation inferiorly and right middle lobe with air bronchograms.  WBC 12.3, hemoglobin 12.9, , platelet count 605.  Sodium 135, potassium 4.7, chloride 94, anion gap  "14, glucose 124, calcium 10.5, BUN 61, creatinine 2.7.  Patient transferred to [facility] emergency department.  Patient now being admitted for further evaluation management.    ASSESSMENT      Enrollment  Primary Care Care Coordination Status: Not a Candidate    Discharge Assessment  How are you doing now that you are home?: \"Doing just fine\"  How are your symptoms? (Red Flag symptoms escalate to triage hotline per guidelines): Improved  Do you feel your condition is stable enough to be safe at home until your provider visit?: Yes  Does the patient have their discharge instructions? : Yes  Does the patient have questions regarding their discharge instructions? : No  Were you started on any new medications or were there changes to any of your previous medications? : No  Does the patient have all of their medications?: Yes  Do you have questions regarding any of your medications? : No  Do you have all of your needed medical supplies or equipment (DME)?  (i.e. oxygen tank, CPAP, cane, etc.): Yes  Discharge follow-up appointment scheduled within 14 calendar days? : No  Is patient agreeable to assistance with scheduling? : Yes (CHW provided phone number to follow up with surgeon and also informed patient it can be found on his after visit summary. He will wait for a call from their office or call today/tomorrow if he doesn't hear from them.)    Post-op (CHW CTA Only)  If the patient had a surgery or procedure, do they have any questions for a nurse?: No      PLAN                                                      Outpatient Plan:      Follow up appointment with Primary Care Physician: [facility] [place] prn  Follow up appointment with Specialist: Dr. Higuera or Associates in 1 week.  Also referral made to the [facility] vascular and wound care clinic at [place].    No future appointments.      For any urgent concerns, please contact our 24 hour nurse triage line: [phone] ([phone])       Estee " Jessica  Community Health Worker  [facility]  Ph: [phone]

## 2022-07-14 LAB
BACTERIA BLD CULT: NO GROWTH
BACTERIA BLD CULT: NO GROWTH

## 2022-07-15 ENCOUNTER — TELEPHONE (OUTPATIENT)
Dept: SURGERY | Facility: CLINIC | Age: 82
End: 2022-07-15

## 2022-07-15 DIAGNOSIS — I10 ESSENTIAL HYPERTENSION: ICD-10-CM

## 2022-07-15 DIAGNOSIS — L97.912 ULCER OF RIGHT LOWER EXTREMITY WITH FAT LAYER EXPOSED (H): ICD-10-CM

## 2022-07-15 DIAGNOSIS — D47.3 ESSENTIAL THROMBOCYTHEMIA (H): ICD-10-CM

## 2022-07-15 DIAGNOSIS — D47.3 ESSENTIAL THROMBOCYTOSIS (H): ICD-10-CM

## 2022-07-15 DIAGNOSIS — G47.33 OBSTRUCTIVE SLEEP APNEA SYNDROME: ICD-10-CM

## 2022-07-15 DIAGNOSIS — L97.812 NON-PRESSURE CHRONIC ULCER OF OTHER PART OF RIGHT LOWER LEG WITH FAT LAYER EXPOSED (H): ICD-10-CM

## 2022-07-15 DIAGNOSIS — L97.911 NON-PRESSURE CHRONIC ULCER OF UNSPECIFIED PART OF RIGHT LOWER LEG LIMITED TO BREAKDOWN OF SKIN (H): Primary | ICD-10-CM

## 2022-07-15 DIAGNOSIS — R60.0 EDEMA OF LOWER EXTREMITY: ICD-10-CM

## 2022-07-15 DIAGNOSIS — I87.2 VENOUS (PERIPHERAL) INSUFFICIENCY: ICD-10-CM

## 2022-07-15 NOTE — TELEPHONE ENCOUNTER
Wife calling patient had surgery a week ago.  Directions say bandage could come off in 1-2 days he still has it on and they are worried it will stick the steri strip and asking what they should do.    Please call and advise.    Thanks!

## 2022-07-15 NOTE — TELEPHONE ENCOUNTER
Returned call to pt and left a detailed message with recommendations for dressing care. Provided my direct phone number for her to call me back if she has further questions.

## 2022-07-18 ENCOUNTER — OFFICE VISIT (OUTPATIENT)
Dept: SURGERY | Facility: CLINIC | Age: 82
End: 2022-07-18
Payer: MEDICARE

## 2022-07-18 DIAGNOSIS — K43.9 SPIGELIAN HERNIA: Primary | ICD-10-CM

## 2022-07-18 PROCEDURE — 99024 POSTOP FOLLOW-UP VISIT: CPT | Performed by: PHYSICIAN ASSISTANT

## 2022-07-18 RX ORDER — FLUTICASONE PROPIONATE AND SALMETEROL XINAFOATE 230; 21 UG/1; UG/1
AEROSOL, METERED RESPIRATORY (INHALATION)
COMMUNITY
Start: 2021-09-03

## 2022-07-18 RX ORDER — LOSARTAN POTASSIUM 25 MG/1
TABLET ORAL
COMMUNITY
Start: 2021-08-24

## 2022-07-18 RX ORDER — BUDESONIDE, GLYCOPYRROLATE, AND FORMOTEROL FUMARATE 160; 9; 4.8 UG/1; UG/1; UG/1
AEROSOL, METERED RESPIRATORY (INHALATION)
COMMUNITY
Start: 2022-02-11

## 2022-07-18 NOTE — LETTER
7/18/2022         RE: Ramu Quigley  [address] [facility] [facility] [address]        Dear Colleague,    Thank you for referring your patient, Ramu Quigley, to the [facility] [place]. Please see a copy of my visit note below.    General Surgery Clinic Appointment    Ramu Quigley is s/p repair of incarcerated spigelian hernia. Patient presents with his wife today. He reports that his pain is minimal. He has been having bowel movements and passing gas and has been tolerating his normal diet. He reports having some swelling in his LLQ in area of incision .      There were no vitals filed for this visit.    PHYSICAL EXAM:  GEN: No acute distress, comfortable  ABD:soft, nontender to palpation. Area overlying LLQ incision with some swelling and firmness, overlying skin with ecchymosis. Incision clean and intact.  EXT:No cyanosis, edema or obvious abnormalities        Assessment:  Ramu Quigley is s/p repair of incarcerated spigelian hernia. Patient doing well. Area of swelling consistent with hematoma. Incision healing nicely.     Plan  Follow up as needed.      Pamela Horton PA-C  General Surgery         Again, thank you for allowing me to participate in the care of your patient.        Sincerely,        Pamela Horton PA-C

## 2022-07-18 NOTE — PROGRESS NOTES
General Surgery Clinic Appointment    Ramu Quigley is s/p repair of incarcerated spigelian hernia. Patient presents with his wife today. He reports that his pain is minimal. He has been having bowel movements and passing gas and has been tolerating his normal diet. He reports having some swelling in his LLQ in area of incision .      There were no vitals filed for this visit.    PHYSICAL EXAM:  GEN: No acute distress, comfortable  ABD:soft, nontender to palpation. Area overlying LLQ incision with some swelling and firmness, overlying skin with ecchymosis. Incision clean and intact.  EXT:No cyanosis, edema or obvious abnormalities        Assessment:  Ramu Quigley is s/p repair of incarcerated spigelian hernia. Patient doing well. Area of swelling consistent with hematoma. Incision healing nicely.     Plan  Follow up as needed.      Pamela Horton PA-C  General Surgery

## 2022-07-29 ENCOUNTER — ANCILLARY PROCEDURE (OUTPATIENT)
Dept: VASCULAR ULTRASOUND | Facility: CLINIC | Age: 82
End: 2022-07-29
Attending: NURSE PRACTITIONER
Payer: MEDICARE

## 2022-07-29 ENCOUNTER — ANCILLARY PROCEDURE (OUTPATIENT)
Dept: VASCULAR ULTRASOUND | Facility: CLINIC | Age: 82
End: 2022-07-29
Attending: FAMILY MEDICINE
Payer: MEDICARE

## 2022-07-29 DIAGNOSIS — G47.33 OBSTRUCTIVE SLEEP APNEA SYNDROME: ICD-10-CM

## 2022-07-29 DIAGNOSIS — R60.0 EDEMA OF LOWER EXTREMITY: ICD-10-CM

## 2022-07-29 DIAGNOSIS — L97.911 NON-PRESSURE CHRONIC ULCER OF UNSPECIFIED PART OF RIGHT LOWER LEG LIMITED TO BREAKDOWN OF SKIN (H): ICD-10-CM

## 2022-07-29 DIAGNOSIS — I87.2 VENOUS (PERIPHERAL) INSUFFICIENCY: ICD-10-CM

## 2022-07-29 DIAGNOSIS — I10 ESSENTIAL HYPERTENSION: ICD-10-CM

## 2022-07-29 DIAGNOSIS — D47.3 ESSENTIAL THROMBOCYTHEMIA (H): ICD-10-CM

## 2022-07-29 DIAGNOSIS — L97.812 NON-PRESSURE CHRONIC ULCER OF OTHER PART OF RIGHT LOWER LEG WITH FAT LAYER EXPOSED (H): ICD-10-CM

## 2022-07-29 DIAGNOSIS — D47.3 ESSENTIAL THROMBOCYTOSIS (H): ICD-10-CM

## 2022-07-29 DIAGNOSIS — L97.912 ULCER OF RIGHT LOWER EXTREMITY WITH FAT LAYER EXPOSED (H): ICD-10-CM

## 2022-07-29 PROCEDURE — 93970 EXTREMITY STUDY: CPT | Mod: 26 | Performed by: SURGERY

## 2022-07-29 PROCEDURE — 93923 UPR/LXTR ART STDY 3+ LVLS: CPT

## 2022-07-29 PROCEDURE — 93923 UPR/LXTR ART STDY 3+ LVLS: CPT | Mod: 26 | Performed by: SURGERY

## 2022-07-29 PROCEDURE — 93970 EXTREMITY STUDY: CPT

## 2022-08-01 ENCOUNTER — OFFICE VISIT (OUTPATIENT)
Dept: VASCULAR SURGERY | Facility: CLINIC | Age: 82
End: 2022-08-01
Attending: SPECIALIST
Payer: MEDICARE

## 2022-08-01 VITALS
HEART RATE: 76 BPM | RESPIRATION RATE: 18 BRPM | BODY MASS INDEX: 23.65 KG/M2 | DIASTOLIC BLOOD PRESSURE: 78 MMHG | WEIGHT: 164.8 LBS | TEMPERATURE: 98.9 F | SYSTOLIC BLOOD PRESSURE: 138 MMHG

## 2022-08-01 DIAGNOSIS — R60.0 VENOUS STASIS ULCER OF RIGHT LOWER LEG WITH EDEMA OF RIGHT LOWER LEG (H): Primary | ICD-10-CM

## 2022-08-01 DIAGNOSIS — R60.0 EDEMA OF LOWER EXTREMITY: ICD-10-CM

## 2022-08-01 DIAGNOSIS — I83.892 VENOUS STASIS ULCER OF LEFT LOWER LEG WITH EDEMA OF LEFT LOWER LEG (H): ICD-10-CM

## 2022-08-01 DIAGNOSIS — L97.929 VENOUS STASIS ULCER OF LEFT LOWER LEG WITH EDEMA OF LEFT LOWER LEG (H): ICD-10-CM

## 2022-08-01 DIAGNOSIS — R60.0 VENOUS STASIS ULCER OF LEFT LOWER LEG WITH EDEMA OF LEFT LOWER LEG (H): ICD-10-CM

## 2022-08-01 DIAGNOSIS — I83.029 VENOUS STASIS ULCER OF LEFT LOWER LEG WITH EDEMA OF LEFT LOWER LEG (H): ICD-10-CM

## 2022-08-01 DIAGNOSIS — I87.2 VENOUS (PERIPHERAL) INSUFFICIENCY: ICD-10-CM

## 2022-08-01 DIAGNOSIS — I83.019 VENOUS STASIS ULCER OF RIGHT LOWER LEG WITH EDEMA OF RIGHT LOWER LEG (H): Primary | ICD-10-CM

## 2022-08-01 DIAGNOSIS — L97.919 VENOUS STASIS ULCER OF RIGHT LOWER LEG WITH EDEMA OF RIGHT LOWER LEG (H): Primary | ICD-10-CM

## 2022-08-01 DIAGNOSIS — I83.891 VENOUS STASIS ULCER OF RIGHT LOWER LEG WITH EDEMA OF RIGHT LOWER LEG (H): Primary | ICD-10-CM

## 2022-08-01 DIAGNOSIS — K56.609 SMALL BOWEL OBSTRUCTION (H): ICD-10-CM

## 2022-08-01 PROCEDURE — 87077 CULTURE AEROBIC IDENTIFY: CPT | Performed by: NURSE PRACTITIONER

## 2022-08-01 PROCEDURE — 11045 DBRDMT SUBQ TISS EACH ADDL: CPT | Performed by: NURSE PRACTITIONER

## 2022-08-01 PROCEDURE — 99203 OFFICE O/P NEW LOW 30 MIN: CPT | Mod: 25 | Performed by: NURSE PRACTITIONER

## 2022-08-01 PROCEDURE — 11044 DBRDMT BONE 1ST 20 SQ CM/<: CPT | Performed by: NURSE PRACTITIONER

## 2022-08-01 PROCEDURE — 87205 SMEAR GRAM STAIN: CPT | Performed by: NURSE PRACTITIONER

## 2022-08-01 PROCEDURE — 11042 DBRDMT SUBQ TIS 1ST 20SQCM/<: CPT | Performed by: NURSE PRACTITIONER

## 2022-08-01 RX ORDER — LIDOCAINE HYDROCHLORIDE 20 MG/ML
JELLY TOPICAL DAILY PRN
Status: ACTIVE | OUTPATIENT
Start: 2022-08-01

## 2022-08-01 ASSESSMENT — PAIN SCALES - GENERAL: PAINLEVEL: NO PAIN (0)

## 2022-08-01 NOTE — PROGRESS NOTES
"        [facility] Consult Note    Date of Service: August 1, 2022     Requesting Provider: Dr. Nuha Higuera    Chief Complaint: BLE wounds    History of Present Illness: Ramu Quigley is being seen at [facility] today regarding BLE wounds. They arrive to the clinic today with wife Gilles. The patient reports that the left leg started first approx 4-5 years ago; was seen by [facility] at that time; he is unsure of what caused these; his wife and him are arguing; he reports history of trauma to the left leg running into a \"dirty bolt\"; the right leg ulcer started 2-3 years ago; this one occurred spontaneously. He recently had surgery with Dr. Higuera and had the wounds surgically debrided 3 weeks ago. Was currently/previously using gauze; rolled gauze. Reports pain of 0/10; currently using nothing for pain. Has used short stretch as compression in the past, is currently using short stretch for compression. Denies any fevers, chills, or generalized ill feeling. Denies history of cancer. Sleeps in a bed with legs elevated sleeps mostly on his back.  Denies history of DVT, Joint Replacement and Vein Procedures. Positive history of Cellulitis. I personally reviewed outside imaging, lab work, and progress noted through Care Everywhere and outside records. He was able to walk to the exam room on his own without assistance.  He had venous insufficiency done last week and this demonstrated deep system incompetence in the left leg; he also had ASHLEY done and these were adequate for healing. No recent cultures or antibiotics.  Former smoker; quit 30 years ago; 1 ppd; smoked for 50 years.       Review of Systems:   Constitutional:  Negative   EENTM: negative for glasses;  negative [place]  GI:  negative for nausea/vomiting;   negative for constipation   negative diarrhea;   negative for fecal incontinence  negative weight loss  :   negative dysuria,  negative incontinence  MS: patient is ambulatory;  " does not use assistive devices  Cardiac: negative   Respiratory:  negative SOB  Endocrine:  negative  diabetes  Psych: negative  depression/anxiety    Past Medical History:   Past Medical History:   Diagnosis Date     Asthma      COPD (chronic obstructive pulmonary disease) (H)      Essential thrombocytopenia (H)      Graves disease      Hypertension      Hypothyroidism      Lumbar disc herniation      Testosterone deficiency         Surgical History:   Past Surgical History:   Procedure Laterality Date     BACK SURGERY      lumbar discectomy     ENT SURGERY      sinus polypectomy     IRRIGATION AND DEBRIDEMENT LOWER EXTREMITY, COMBINED Bilateral 7/9/2022    Procedure: IRRIGATION AND DEBRIDEMENT, BILATERAL LOWER LEG ULCERS;  Surgeon: Nuha Higuera MD;  Location: [facility] OR     LAPAROTOMY EXPLORATORY N/A 7/9/2022    Procedure: LAPAROTOMY,;  Surgeon: Nuha Higuera MD;  Location: [facility] OR     REPAIR SPIGELIAN HERNIA Left 7/9/2022    Procedure: REPAIR INCARCERATED SPIGELIAN HERNIA, LEFT;  Surgeon: Nuha Higuera MD;  Location: [facility] OR     WEDGE RESECTION EYELID Left 11/4/2020    Procedure: LEFT LOWER LID WEDGE EXCISION WITH FROZEN SECTION CONTROL;  Surgeon: Beulah Ba MD;  Location:  OR        Medications:   Current Outpatient Medications   Medication Sig     ADVAIR -21 MCG/ACT inhaler INHALE 2 PUFFS BY MOUTH TWICE DAILY     albuterol (PROAIR HFA/PROVENTIL HFA/VENTOLIN HFA) 108 (90 Base) MCG/ACT inhaler Inhale 2 puffs into the lungs as needed for shortness of breath / dyspnea or wheezing     ascorbic acid 1000 MG TABS tablet Take 1,000 mg by mouth     aspirin (ASA) 81 MG EC tablet Take 81 mg by mouth daily     BREZTRI AEROSPHERE 160-9-4.8 MCG/ACT AERO INHALE 2 PUFFS BY MOUTH TWICE DAILY     calcium carbonate-vitamin D (OS-JOSUE WITH D) 500-200 MG-UNIT tablet Take 1 tablet by mouth     cholecalciferol (DIALYVITE VITAMIN D 5000) 125 MCG (5000 UT) CAPS Take 2 capsules (10,000  Units) by mouth daily     ketoconazole (NIZORAL A-D) 1 % shampoo Externally apply topically as needed     levothyroxine (SYNTHROID/LEVOTHROID) 100 MCG tablet Take 100 mcg by mouth daily     losartan (COZAAR) 100 MG tablet Take 100 mg by mouth daily     losartan (COZAAR) 25 MG tablet      multivitamin w/minerals (THERA-VIT-M) tablet Take 1 tablet by mouth     vitamin C (ASCORBIC ACID) 1000 MG TABS Take 1 tablet (1,000 mg) by mouth 2 times daily     ferrous sulfate (FEROSUL) 325 (65 Fe) MG tablet Take 325 mg by mouth daily (with breakfast) (Patient not taking: Reported on 8/1/2022)     Current Facility-Administered Medications   Medication     lidocaine (XYLOCAINE) 2 % external gel       Allergies:   Allergies   Allergen Reactions     Cats Itching     Dust Mites Itching     Mold Itching     Levaquin [Levofloxacin]      Ankle swelling       Family history: No family history on file.     Social History:   Social History     Socioeconomic History     Marital status:      Spouse name: Not on file     Number of children: Not on file     Years of education: Not on file     Highest education level: Not on file   Occupational History     Not on file   Tobacco Use     Smoking status: Former Smoker     Smokeless tobacco: Never Used     Tobacco comment: quit over 40 yrs ago   Substance and Sexual Activity     Alcohol use: Yes     Comment: 3 drinks/week     Drug use: Never     Sexual activity: Not on file   Other Topics Concern     Parent/sibling w/ CABG, MI or angioplasty before 65F 55M? Not Asked   Social History Narrative     Not on file     Social Determinants of Health     Financial Resource Strain: Not on file   Food Insecurity: Not on file   Transportation Needs: Not on file   Physical Activity: Not on file   Stress: Not on file   Social Connections: Not on file   Intimate Partner Violence: Not on file   Housing Stability: Not on file        Physical Exam  Vitals: /78   Pulse 76   Temp 98.9  F (37.2  C)    Resp 18   Wt 164 lb 12.8 oz (74.8 kg)   BMI 23.65 kg/m    Weight is 164 lbs 12.8 oz          Body mass index is 23.65 kg/m .  General: This is a 82 year old male who appears their reported age, not in acute distress  Head: normocephalic, Atraumatic; wearing glasses; non-icteric sclera; no exudate; moderate hearing loss   Respiratory: Clear throughout all lung fields; unlabored breathing; no cough   Cardiac: Regular, Rate and Rhythm, no murmurs appreciated   Skin: Uniformly warm and dry  Psych: Alert and oriented x4; calm and cooperative throughout exam  Abdomen: Normal bowel sounds. Soft, symmetric, no guarding or rigidity, nontender with palpation.  No organomegaly or masses palpated.   Extremities: BLE with +2 pitting edema; there are multiple full thickness ulcers with slough on both legs; see measures below +epibole; +purulent drainage; moderate to healvy strength testing revealed 4/4 to BLEs.    Sensation: Intact to pinprick and light touch throughout lower extremities bilaterally     Peripheral Vascular: normal dorsalis pedis, posterior tibial pulses to bilateral feet , using a handheld doppler these were strong biphasic in nature.  Good capillary refill. No unusual venous distention. Positive for spider veins, telangiectasias, hemosiderin deposition or hyperpigmentation and fibrosis or scarring         Circumferential volume measures:        Circumferential Measures 8/1/2022   Right just above MTP 24.5   Right Ankle 25   Right Widest Calf 36.   Left - just above MTP 25.4   Left Ankle 25.5   Left Widest Calf 42.2       Ulceration(s)/Wound(s):     VASC Wound Lt lateral ankle (Active)   Pre Size Length 0.7 08/01/22 1300   Pre Size Width 0.7 08/01/22 1300   Pre Size Depth 0.2 08/01/22 1300   Pre Total Sq cm 0.49 08/01/22 1300       VASC Wound Lt lateral leg (Active)   Pre Size Length 8.8 08/01/22 1300   Pre Size Width 5.5 08/01/22 1300   Pre Size Depth 0.2 08/01/22 1300   Pre Total Sq cm 48.4 08/01/22 1300        VASC Wound Lt lower shin (Active)   Pre Size Length 2 08/01/22 1300   Pre Size Width 1.5 08/01/22 1300   Pre Size Depth 0.1 08/01/22 1300   Pre Total Sq cm 3 08/01/22 1300       VASC Wound Rt lateral leg (Active)   Pre Size Length 3.5 08/01/22 1300   Pre Size Width 2 08/01/22 1300   Pre Size Depth 0.2 08/01/22 1300   Pre Total Sq cm 7 08/01/22 1300       Laboratory studies:     I personally reviewed the following lab results today and those on care everywhere, if indicated     CRP   Date Value Ref Range Status   07/08/2022 5.5 (H) 0.0 - <0.8 mg/dL Final      No results found for: SED   Last Renal Panel:  Sodium   Date Value Ref Range Status   07/10/2022 143 136 - 145 mmol/L Final     Potassium   Date Value Ref Range Status   07/10/2022 4.3 3.5 - 5.0 mmol/L Final     Chloride   Date Value Ref Range Status   07/10/2022 110 (H) 98 - 107 mmol/L Final     Carbon Dioxide (CO2)   Date Value Ref Range Status   07/10/2022 24 22 - 31 mmol/L Final     Anion Gap   Date Value Ref Range Status   07/10/2022 9 5 - 18 mmol/L Final     Glucose   Date Value Ref Range Status   07/10/2022 93 70 - 125 mg/dL Final     GLUCOSE BY METER POCT   Date Value Ref Range Status   07/10/2022 79 70 - 99 mg/dL Final     Urea Nitrogen   Date Value Ref Range Status   07/10/2022 45 (H) 8 - 28 mg/dL Final     Creatinine   Date Value Ref Range Status   07/10/2022 1.81 (H) 0.70 - 1.30 mg/dL Final     GFR Estimate   Date Value Ref Range Status   07/10/2022 37 (L) >60 mL/min/1.73m2 Final     Comment:     Effective December 21, 2021 eGFRcr in adults is calculated using the 2021 CKD-EPI creatinine equation which includes age and gender (Michell et al., NEJ, DOI: 10.1056/WXDCxa6114210)     Calcium   Date Value Ref Range Status   07/10/2022 7.9 (L) 8.5 - 10.5 mg/dL Final     Phosphorus   Date Value Ref Range Status   07/08/2022 5.6 (H) 2.5 - 4.5 mg/dL Final     Albumin   Date Value Ref Range Status   07/09/2022 3.0 (L) 3.5 - 5.0 g/dL Final      Lab Results    Component Value Date    WBC 10.9 07/10/2022     Lab Results   Component Value Date    RBC 3.25 07/10/2022     Lab Results   Component Value Date    HGB 10.2 07/10/2022     Lab Results   Component Value Date    HCT 33.6 07/10/2022     No components found for: MCT  Lab Results   Component Value Date     07/10/2022     Lab Results   Component Value Date    MCH 31.4 07/10/2022     Lab Results   Component Value Date    MCHC 30.4 07/10/2022     Lab Results   Component Value Date    RDW 13.6 07/10/2022     Lab Results   Component Value Date     07/10/2022      No results found for: A1C   TSH   Date Value Ref Range Status   07/08/2022 0.49 0.30 - 5.00 uIU/mL Final      No results found for: VITDT       Impression:    Encounter Diagnoses   Name Primary?     Venous stasis ulcer of right lower leg with edema of right lower leg (H) Yes     Small bowel obstruction (H)      Venous stasis ulcer of left lower leg with edema of left lower leg (H)      Edema of lower extremity      Venous (peripheral) insufficiency        Assessment/Plan:  1. Debridement: After discussion of risk factors and verbal consent was obtained 2% Lidocaine HCL jelly was applied, under clean conditions, the BLE ulceration(s) were debrided using currette. Devitalized and nonviable tissue, along with any fibrin and slough, was removed to improve granulation tissue formation, stimulate wound healing, decrease overall bacteria load, disrupt biofilm formation and decrease edge senescence.  Total excisional debridement was 58.89 sq cm from the epidermis/dermis area or into the subcutaneous tissue with a depth of 0.1 cm.   Ulcers were improved afterwards and .  Measures were unchanged after debridement.    2. Treatment: wound treatment will include irrigation and dressings to promote autolytic debridement which will include: will obtain a culture today; will hold off on prescribing antibiotics until the sensitivities are back; will order  hydrofera blue ready; will use silvercel until this arrives; he is not home bound would not qualify for home care; will have them come twice per week for NV. Venous and arterial studies were essentially normal. ADDENDUM: culture was positive; will treat with keflex TID for 10 days; will call patient to update.    If for some reason the patient is not able to get your dressing(s) changed as outlined above (due to illness, lack of supplies, lack of help) please do the following: remove old, soiled dressings; wash the wounds with saline; pat dry; apply ABD pad or other absorbant pad and secure with rolled gauze; avoid tape directly on your skin; Patient instructed to call the clinic as soon as possible to let us know what the current issues are in receiving wound care.    3. Edema.     If a 2 layer or 4 layer compression wrap is being used; these are safe to have on for ONLY 7 days. If for some reason the patient is not able to get the wrap(s) changed (due to illness; lack of supplies, lack of help, lack of transportation) please do the following: unwrap the old 2 or 4 layer compression wrap; avoid using scissors as you could cut your skin and cause wounds; use tubular compression when available. Call to reschedule your home care or clinic visit appointment as soon as possible.    4. Offloading: spoke to him about positioning in the bed; avoiding pressure over the wound kale    5. Nutrition: focus on protein; take daily MVI    Patient to return to clinic in 4 week(s) for re-evaluation; NV twice per week until then. They were instructed to call the clinic sooner with any further questions or concerns. Answered all questions.          Deborah Green NP   [facility]  [phone]      This note was electronically signed by Deborah Green NP

## 2022-08-01 NOTE — PATIENT INSTRUCTIONS
"Wound care supplies were ordered today through [employer] and if you are not receiving your supplies or have a question on your bill please contact Johanne Ascencio at [phone]. Please allow 2-5 business days for delivery of supplies.     Elevate the legs for 30 minutes 3-4 times per day    Today a culture was ordered this will take 4-5 days to get results we will call you with these    Keep pressure off the wound area    Take daily Multi vitamin     Take 1 premier protein shake per day      Wound Care Instructions    2 TIMES PER WEEK and as needed, Cleanse your bilateral legs and leg wound(s) with Dilute hibiclens 30cc in 500cc NS.    Pat Dry with non-sterile gauze    Apply Lotion to the intact skin surrounding your wound and other dry skin locations. Some good lotions include: Remedy Skin Repair Cream, Sarna, Vanicream or Cetaphil    Primary Dressing: Apply hydrofera blue ready into/onto the wounds; will use silvercel until this arrives; then bring to clinic    Secondary dressing: Cover with ABD    Secure with non-sterile roll gauze (4\" x 75\" roll) and tape (1\" roll tape) as needed; avoid adhesive directly on the skin    Compression: 2 layer bilaterally    It is not ok to get your wound wet in the bath or shower      If for some reason you are not able to get your dressing(s) changed as outlined above (due to illness, lack of supplies, lack of help) please do the following: remove old, soiled dressings; wash the wounds with saline; pat dry; apply ABD pad or other absorbant pad and secure with rolled gauze; avoid tape directly on your skin; Call the clinic as soon as possible to let us know what the current issues are in receiving wound care [phone].      SEEK MEDICAL CARE IF:  You have an increase in swelling, pain, or redness around the wound.  You have an increase in the amount of pus coming from the wound.  There is a bad smell coming from the wound.  The wound appears to be worsening/enlarging  You have a " fever greater than 101.5 F      It is ok to continue current wound care treatment/products for the next 2-3 days until new wound care supplies are ordered and arrive. If longer than this please contact our office at [phone].    If you have a 2 layer or 4 layer compression wrap on these are safe to have on for ONLY 7 days. If for some reason you are not able to get the wrap(s) changed (due to illness; lack of supplies, lack of help, lack of transportation) please do the following: unwrap the old 2 or 4 layer compression wrap; avoid using scissors as you could cut your skin and cause wounds; use tubular compression when available. Call to reschedule your home care or clinic visit appointment as soon as possible.    Please NOTE: if you are 15 minutes late to your clinic appointment you will have to be rescheduled. Please call our clinic as soon as possible if you know you will not be able to get to your appointment at [phone].    If you fail to show up to 3 scheduled clinic appointments you will be dismissed from our clinic.        It is recommended that you do not get your ulcer wet when showering.  Listed below are several ways of keeping it dry when you shower.     1. Wrap it with Press and Seal plastic wrap.  It can be found in the stores where the plastic wraps or tin foil is kept.               2.  Some people take a bath and hang their leg/foot out of the tub.                        3  Put your leg in a plastic bag and tape it on.           4. You can purchase a shower cover for casts at some pharmacies and through the Internet.            5. Take a Bed Bath or wash up at the sink            We want to hear from you!  In the next few weeks, you should receive a call or email to complete a survey about your visit at [facility]. Please help us improve your appointment experience by letting us know how we did today. We strive to make your experience good and value any ways in which we  could do better.      We value your input and suggestions.    Thank you for choosing the [facility]!

## 2022-08-04 LAB
BACTERIA SPEC CULT: ABNORMAL
GRAM STAIN RESULT: ABNORMAL
GRAM STAIN RESULT: ABNORMAL

## 2022-08-05 ENCOUNTER — ALLIED HEALTH/NURSE VISIT (OUTPATIENT)
Dept: VASCULAR SURGERY | Facility: CLINIC | Age: 82
End: 2022-08-05
Attending: NURSE PRACTITIONER
Payer: MEDICARE

## 2022-08-05 ENCOUNTER — TELEPHONE (OUTPATIENT)
Dept: VASCULAR SURGERY | Facility: CLINIC | Age: 82
End: 2022-08-05

## 2022-08-05 VITALS — HEART RATE: 92 BPM | SYSTOLIC BLOOD PRESSURE: 140 MMHG | TEMPERATURE: 98 F | DIASTOLIC BLOOD PRESSURE: 78 MMHG

## 2022-08-05 DIAGNOSIS — I83.892 VENOUS STASIS ULCER OF LEFT LOWER LEG WITH EDEMA OF LEFT LOWER LEG (H): ICD-10-CM

## 2022-08-05 DIAGNOSIS — L97.919 VENOUS STASIS ULCER OF RIGHT LOWER LEG WITH EDEMA OF RIGHT LOWER LEG (H): Primary | ICD-10-CM

## 2022-08-05 DIAGNOSIS — R60.0 VENOUS STASIS ULCER OF LEFT LOWER LEG WITH EDEMA OF LEFT LOWER LEG (H): ICD-10-CM

## 2022-08-05 DIAGNOSIS — L97.929 VENOUS STASIS ULCER OF LEFT LOWER LEG WITH EDEMA OF LEFT LOWER LEG (H): ICD-10-CM

## 2022-08-05 DIAGNOSIS — I83.029 VENOUS STASIS ULCER OF LEFT LOWER LEG WITH EDEMA OF LEFT LOWER LEG (H): ICD-10-CM

## 2022-08-05 DIAGNOSIS — I83.891 VENOUS STASIS ULCER OF RIGHT LOWER LEG WITH EDEMA OF RIGHT LOWER LEG (H): Primary | ICD-10-CM

## 2022-08-05 DIAGNOSIS — I83.019 VENOUS STASIS ULCER OF RIGHT LOWER LEG WITH EDEMA OF RIGHT LOWER LEG (H): Primary | ICD-10-CM

## 2022-08-05 DIAGNOSIS — R60.0 VENOUS STASIS ULCER OF RIGHT LOWER LEG WITH EDEMA OF RIGHT LOWER LEG (H): Primary | ICD-10-CM

## 2022-08-05 PROCEDURE — 29581 APPL MULTLAYER CMPRN SYS LEG: CPT

## 2022-08-05 RX ORDER — CEPHALEXIN 500 MG/1
500 CAPSULE ORAL 3 TIMES DAILY
Qty: 30 CAPSULE | Refills: 0 | Status: SHIPPED | OUTPATIENT
Start: 2022-08-05 | End: 2022-08-15

## 2022-08-05 ASSESSMENT — PAIN SCALES - GENERAL: PAINLEVEL: NO PAIN (0)

## 2022-08-05 NOTE — TELEPHONE ENCOUNTER
Pt wife was updated on the results. Keflex instructions discussed. Probiotic recommendations discussed, she stated that she does not think he will take it, she said it will be hard to get him to take 3 pills a day for 10 days. She doesn't know if he has developed diarrhea in the past.     She stated that the received a box from [employer] and was wondering what to do with it. Informed to bring 1 hydrafera blue with to each nurse visit.

## 2022-08-05 NOTE — TELEPHONE ENCOUNTER
----- Message from Deborah Green NP sent at 8/5/2022  9:56 AM CDT -----  Please call the patient and let him know that his culture grew out x2 organsims and will treat with keflex 500mg TID for 10 days; take with food    Patient can also  some probiotics such as Culturelle to help prevent Antibiotic associated diarrhea. They can take this 1 hour before or 2 hours after taking their antibiotic. Should not be taken at the same time as they can cancel out the effects.     cyrli

## 2022-08-05 NOTE — PATIENT INSTRUCTIONS
"Wound care supplies were ordered today through [employer] and if you are not receiving your supplies or have a question on your bill please contact Johanne Ascencio at [phone]. Please allow 2-5 business days for delivery of supplies.     Elevate the legs for 30 minutes 3-4 times per day    Today a culture was ordered this will take 4-5 days to get results we will call you with these    Keep pressure off the wound area    Take daily Multi vitamin     Take 1 premier protein shake per day      Wound Care Instructions    2 TIMES PER WEEK and as needed, Cleanse your bilateral legs and leg wound(s) with Dilute hibiclens 30cc in 500cc NS.    Pat Dry with non-sterile gauze    Apply Lotion to the intact skin surrounding your wound and other dry skin locations. Some good lotions include: Remedy Skin Repair Cream, Sarna, Vanicream or Cetaphil    Primary Dressing: Apply hydrofera blue ready into/onto the wounds; will use silvercel until this arrives; then bring to clinic    Secondary dressing: Cover with ABD    Secure with non-sterile roll gauze (4\" x 75\" roll) and tape (1\" roll tape) as needed; avoid adhesive directly on the skin    Compression: 2 layer bilaterally    It is not ok to get your wound wet in the bath or shower      If for some reason you are not able to get your dressing(s) changed as outlined above (due to illness, lack of supplies, lack of help) please do the following: remove old, soiled dressings; wash the wounds with saline; pat dry; apply ABD pad or other absorbant pad and secure with rolled gauze; avoid tape directly on your skin; Call the clinic as soon as possible to let us know what the current issues are in receiving wound care [phone].      SEEK MEDICAL CARE IF:  You have an increase in swelling, pain, or redness around the wound.  You have an increase in the amount of pus coming from the wound.  There is a bad smell coming from the wound.  The wound appears to be worsening/enlarging  You have a " fever greater than 101.5 F      It is ok to continue current wound care treatment/products for the next 2-3 days until new wound care supplies are ordered and arrive. If longer than this please contact our office at [phone].    If you have a 2 layer or 4 layer compression wrap on these are safe to have on for ONLY 7 days. If for some reason you are not able to get the wrap(s) changed (due to illness; lack of supplies, lack of help, lack of transportation) please do the following: unwrap the old 2 or 4 layer compression wrap; avoid using scissors as you could cut your skin and cause wounds; use tubular compression when available. Call to reschedule your home care or clinic visit appointment as soon as possible.    Please NOTE: if you are 15 minutes late to your clinic appointment you will have to be rescheduled. Please call our clinic as soon as possible if you know you will not be able to get to your appointment at [phone].    If you fail to show up to 3 scheduled clinic appointments you will be dismissed from our clinic.        It is recommended that you do not get your ulcer wet when showering.  Listed below are several ways of keeping it dry when you shower.     1. Wrap it with Press and Seal plastic wrap.  It can be found in the stores where the plastic wraps or tin foil is kept.               2.  Some people take a bath and hang their leg/foot out of the tub.                        3  Put your leg in a plastic bag and tape it on.           4. You can purchase a shower cover for casts at some pharmacies and through the Internet.            5. Take a Bed Bath or wash up at the sink            We want to hear from you!  In the next few weeks, you should receive a call or email to complete a survey about your visit at [facility]. Please help us improve your appointment experience by letting us know how we did today. We strive to make your experience good and value any ways in which we  could do better.      We value your input and suggestions.    Thank you for choosing the [facility]!

## 2022-08-05 NOTE — PROGRESS NOTES
[facility]  -  Nurse Visit    BLE      R lateral leg      L medial shin      Left lateral leg            Date of Service:  August 5, 2022     Requesting Provider: TOI Green    Diagnosis:     ICD-10-CM    1. Venous stasis ulcer of right lower leg with edema of right lower leg (H)  I83.019     I83.891     L97.919     R60.9    2. Venous stasis ulcer of left lower leg with edema of left lower leg (H)  I83.029     I83.892     L97.929     R60.9        Chief Complaint: Ramu is being seen today at [facility] for his wound(s) and swelling dressing change. They arrive to the clinic today with Patient and spouse. Reports pain of 0.  Denies any fevers, chills, or generalized ill feeling.     Dressing on Arrival: silvercel, ABD, rolled gauze, Pt is currently using 2 layer bilatrally for compression and did tolerate well. Upon removal of dressings moderate Serous drainage is noted.    New Wounds noted: No    Vital Signs: BP (!) 140/78   Pulse 92   Temp 98  F (36.7  C)     Assessment:      General:  Patient presents to clinic in no apparent distress.  Psychiatric:  Alert and oriented x3.   Lower extremity:  edema is present.    Integumentary:  Skin is intact    Circumferential volume measures:  Circumferential Measures 8/1/2022 8/5/2022   Right just above MTP 24.5 24   Right Ankle 25 27   Right Widest Calf 36. 35   Left - just above MTP 25.4 24.5   Left Ankle 25.5 29   Left Widest Calf 42.2 39.5       Wound info:  Wound (used by OP [facility] only) 03/03/21 1237 Right (Active)       VASC Wound Lt lateral ankle (Active)   Pre Size Length 0.9 08/05/22 0800   Pre Size Width 0.7 08/05/22 0800   Pre Size Depth 0.2 08/05/22 0800   Pre Total Sq cm 0.63 08/05/22 0800       VASC Wound Lt lateral leg (Active)   Pre Size Length 8.8 08/05/22 0800   Pre Size Width 5.5 08/05/22 0800   Pre Size Depth 0.2 08/05/22 0800   Pre Total Sq cm 48.4 08/01/22 1300       VASC Wound Lt lower shin (Active)    Pre Size Length 2.8 22 0800   Pre Size Width 1.8 22 0800   Pre Size Depth 0.1 22 0800   Pre Total Sq cm 5.04 22 0800       VASC Wound Rt lateral leg (Active)   Pre Size Length 3.9 22 0800   Pre Size Width 1.5 22 0800   Pre Size Depth 0.2 22 0800   Pre Total Sq cm 5.85 22 0800       Incision/Surgical Site 20 Left Lower Eyelid (Active)       Incision/Surgical Site 22 Abdomen (Active)   Incision Assessment UTV 07/10/22 0900   Chery-Incision Assessment Edema 22 1700   Closure YAMILET 22 1700   Incision Drainage Amount Scant 22 1700   Drainage Description Sanguinous 22 1700   Incision Care Ice applied 22 1700   Dressing Intervention Clean, dry, intact 07/10/22 0900       Incision/Surgical Site 22 Bilateral Leg (Active)   Incision Assessment UTV 07/10/22 0900   Incision Drainage Amount UTV 07/10/22 0900   Drainage Description UTV 07/10/22 0900   Dressing Intervention Clean, dry, intact 07/10/22 0900       Undermining is not present.    The periwoundskin is WNL  Wound beds are mix of slough and pale granular tissue    Plan:         1. Patient will return in 4 days for nurse visit.            2. Treatment provided will include irrigation and dressings to promote autolytic debridement and will be as listed below             Cleansed with: Dilute hibiclens 30cc in 500cc NS and soap and water    Protected skin with: Remedy skin repair lotion    Dressings Applied to wound: Silver alginate, ABD and Secured with roll gauze and tape.     Compression Applied to the right le-Layer Compression  Compression Applied to the left le-Layer Compression    2-Layer Coban: I Applied the inner foam layer with the foot dorsiflexed and started atthe base of the fifth metatarsal head. I left the bottom of the heel exposed, and proceed by winding the foam up the leg using minimal overlap to just below the fibular head. I then applied the  compression layer with the foot dorsiflexed and startingat the base of the fifth metatarsal head. I applied at full stretch and proceeded up the leg using 50% overlap. The bottom of the heel is covered with the compression layer up to the end at the fibular head just below the back of the knee and levelwith the top edge of the foam layer.  I gently pressed and conformed the entire surface of the system to ensurethat the two layers are firmly bound together      Offloading used: None    Trial Products: No    Provider notified regarding concerns: No    Treatment Changes: No    Tolerated Dressing Change:  Yes    Taught Regarding: follow up appointment(s), wound cares, compression and layered compression wraps - maximum wear time of 7 days    Educational Barriers: No barriers      JOSE JETT RN CWOCN, CFCN

## 2022-08-05 NOTE — TELEPHONE ENCOUNTER
----- Message from Deborah Green NP sent at 8/5/2022  9:59 AM CDT -----  Please call the patient and let him know that his Arterial study was normal

## 2022-08-09 ENCOUNTER — ALLIED HEALTH/NURSE VISIT (OUTPATIENT)
Dept: VASCULAR SURGERY | Facility: CLINIC | Age: 82
End: 2022-08-09
Attending: NURSE PRACTITIONER
Payer: MEDICARE

## 2022-08-09 VITALS — SYSTOLIC BLOOD PRESSURE: 138 MMHG | DIASTOLIC BLOOD PRESSURE: 70 MMHG | HEART RATE: 76 BPM

## 2022-08-09 DIAGNOSIS — I83.891 VENOUS STASIS ULCER OF RIGHT LOWER LEG WITH EDEMA OF RIGHT LOWER LEG (H): Primary | ICD-10-CM

## 2022-08-09 DIAGNOSIS — R60.0 VENOUS STASIS ULCER OF LEFT LOWER LEG WITH EDEMA OF LEFT LOWER LEG (H): ICD-10-CM

## 2022-08-09 DIAGNOSIS — I83.029 VENOUS STASIS ULCER OF LEFT LOWER LEG WITH EDEMA OF LEFT LOWER LEG (H): ICD-10-CM

## 2022-08-09 DIAGNOSIS — L97.919 VENOUS STASIS ULCER OF RIGHT LOWER LEG WITH EDEMA OF RIGHT LOWER LEG (H): Primary | ICD-10-CM

## 2022-08-09 DIAGNOSIS — I83.892 VENOUS STASIS ULCER OF LEFT LOWER LEG WITH EDEMA OF LEFT LOWER LEG (H): ICD-10-CM

## 2022-08-09 DIAGNOSIS — R60.0 VENOUS STASIS ULCER OF RIGHT LOWER LEG WITH EDEMA OF RIGHT LOWER LEG (H): Primary | ICD-10-CM

## 2022-08-09 DIAGNOSIS — I83.019 VENOUS STASIS ULCER OF RIGHT LOWER LEG WITH EDEMA OF RIGHT LOWER LEG (H): Primary | ICD-10-CM

## 2022-08-09 DIAGNOSIS — L97.929 VENOUS STASIS ULCER OF LEFT LOWER LEG WITH EDEMA OF LEFT LOWER LEG (H): ICD-10-CM

## 2022-08-09 PROCEDURE — 29581 APPL MULTLAYER CMPRN SYS LEG: CPT

## 2022-08-09 ASSESSMENT — PAIN SCALES - GENERAL: PAINLEVEL: NO PAIN (0)

## 2022-08-09 NOTE — PATIENT INSTRUCTIONS
"      Wound Care Instructions    2 TIMES PER WEEK and as needed, Cleanse your bilateral legs and leg wound(s) with Dilute hibiclens 30cc in 500cc NS.    Pat Dry with non-sterile gauze    Apply Lotion to the intact skin surrounding your wound and other dry skin locations. Some good lotions include: Remedy Skin Repair Cream, Sarna, Vanicream or Cetaphil    Primary Dressing: Apply hydrofera blue ready into/onto the wounds; will use silvercel until this arrives; then bring to clinic    Secondary dressing: Cover with ABD    Secure with non-sterile roll gauze (4\" x 75\" roll) and tape (1\" roll tape) as needed; avoid adhesive directly on the skin    Compression: 2 layer bilaterally    It is not ok to get your wound wet in the bath or shower      If for some reason you are not able to get your dressing(s) changed as outlined above (due to illness, lack of supplies, lack of help) please do the following: remove old, soiled dressings; wash the wounds with saline; pat dry; apply ABD pad or other absorbant pad and secure with rolled gauze; avoid tape directly on your skin; Call the clinic as soon as possible to let us know what the current issues are in receiving wound care [phone].      SEEK MEDICAL CARE IF:  You have an increase in swelling, pain, or redness around the wound.  You have an increase in the amount of pus coming from the wound.  There is a bad smell coming from the wound.  The wound appears to be worsening/enlarging  You have a fever greater than 101.5 F      It is ok to continue current wound care treatment/products for the next 2-3 days until new wound care supplies are ordered and arrive. If longer than this please contact our office at [phone].    If you have a 2 layer or 4 layer compression wrap on these are safe to have on for ONLY 7 days. If for some reason you are not able to get the wrap(s) changed (due to illness; lack of supplies, lack of help, lack of transportation) please do the following: " unwrap the old 2 or 4 layer compression wrap; avoid using scissors as you could cut your skin and cause wounds; use tubular compression when available. Call to reschedule your home care or clinic visit appointment as soon as possible.    Please NOTE: if you are 15 minutes late to your clinic appointment you will have to be rescheduled. Please call our clinic as soon as possible if you know you will not be able to get to your appointment at [phone].    If you fail to show up to 3 scheduled clinic appointments you will be dismissed from our clinic.        It is recommended that you do not get your ulcer wet when showering.  Listed below are several ways of keeping it dry when you shower.     1. Wrap it with Press and Seal plastic wrap.  It can be found in the stores where the plastic wraps or tin foil is kept.               2.  Some people take a bath and hang their leg/foot out of the tub.                        3  Put your leg in a plastic bag and tape it on.           4. You can purchase a shower cover for casts at some pharmacies and through the Internet.            5. Take a Bed Bath or wash up at the sink            We want to hear from you!  In the next few weeks, you should receive a call or email to complete a survey about your visit at [facility]. Please help us improve your appointment experience by letting us know how we did today. We strive to make your experience good and value any ways in which we could do better.      We value your input and suggestions.    Thank you for choosing the [facility]!

## 2022-08-12 ENCOUNTER — ALLIED HEALTH/NURSE VISIT (OUTPATIENT)
Dept: VASCULAR SURGERY | Facility: CLINIC | Age: 82
End: 2022-08-12
Attending: NURSE PRACTITIONER
Payer: MEDICARE

## 2022-08-12 VITALS
TEMPERATURE: 98.1 F | SYSTOLIC BLOOD PRESSURE: 140 MMHG | RESPIRATION RATE: 16 BRPM | HEART RATE: 76 BPM | DIASTOLIC BLOOD PRESSURE: 72 MMHG

## 2022-08-12 DIAGNOSIS — I87.2 VENOUS (PERIPHERAL) INSUFFICIENCY: ICD-10-CM

## 2022-08-12 DIAGNOSIS — R60.0 EDEMA OF LOWER EXTREMITY: ICD-10-CM

## 2022-08-12 DIAGNOSIS — L97.929 VENOUS STASIS ULCER OF LEFT LOWER LEG WITH EDEMA OF LEFT LOWER LEG (H): ICD-10-CM

## 2022-08-12 DIAGNOSIS — I83.029 VENOUS STASIS ULCER OF LEFT LOWER LEG WITH EDEMA OF LEFT LOWER LEG (H): ICD-10-CM

## 2022-08-12 DIAGNOSIS — R60.0 VENOUS STASIS ULCER OF LEFT LOWER LEG WITH EDEMA OF LEFT LOWER LEG (H): ICD-10-CM

## 2022-08-12 DIAGNOSIS — I83.891 VENOUS STASIS ULCER OF RIGHT LOWER LEG WITH EDEMA OF RIGHT LOWER LEG (H): Primary | ICD-10-CM

## 2022-08-12 DIAGNOSIS — I83.019 VENOUS STASIS ULCER OF RIGHT LOWER LEG WITH EDEMA OF RIGHT LOWER LEG (H): Primary | ICD-10-CM

## 2022-08-12 DIAGNOSIS — I83.892 VENOUS STASIS ULCER OF LEFT LOWER LEG WITH EDEMA OF LEFT LOWER LEG (H): ICD-10-CM

## 2022-08-12 DIAGNOSIS — L97.919 VENOUS STASIS ULCER OF RIGHT LOWER LEG WITH EDEMA OF RIGHT LOWER LEG (H): Primary | ICD-10-CM

## 2022-08-12 DIAGNOSIS — R60.0 VENOUS STASIS ULCER OF RIGHT LOWER LEG WITH EDEMA OF RIGHT LOWER LEG (H): Primary | ICD-10-CM

## 2022-08-12 PROCEDURE — 29581 APPL MULTLAYER CMPRN SYS LEG: CPT

## 2022-08-12 ASSESSMENT — PAIN SCALES - GENERAL: PAINLEVEL: NO PAIN (0)

## 2022-08-12 NOTE — PATIENT INSTRUCTIONS
- Please call us if your compression wraps fall more than 1-2 inches below the bend of the knee. Remove the wraps if you experience any shortness of breath or notice your toes turning blue/purple and then give us a call. Call if they are too painful. Call if they get wet. If it is a weekend and the wraps fall down, are too painful, or get wet take the wraps off and put on another form of compression. Compression such as velcro wraps, compression stockings, short stretch bandages, or tubular compression. Apply one of these until you can be seen in clinic. The layered compression wraps are safe to have on for ONLY 7 days. If for some reason you are not able to get the wrap(s) changed (due to illness; lack of supplies, lack of help, lack of transportation) please do the following: unwrap the old 2 or 4 layer compression wrap; avoid using scissors as you could cut your skin and cause wounds; use tubular compression when available. Call to reschedule your home care or clinic visit appointment as soon as possible.     - Treatment:  Layered Compression Bandaging (2-layer)    What is it?  The layered compression bandaging has a layer of absorbent material that will soak up drainage.     Why we do it.   This is done to treat swelling, wounds, or both.  This will in turn help circulation and healing.    How to care for your bandages.  The wraps need to be kept dry. If  the wraps become wet, remove them and call the clinic to have another wrap applied.    What to expect.  It is common for the wraps to be uncomfortable at the beginning. The first two days are usually the hardest; then they will become more comfortable.       Elevating your legs will help the discomfort. Try to elevate your legs as much as possible.    If rest and elevation does not help your discomfort, call your provider.  If your provider is not available you can remove the wrap and leave a message for further instructions.    - Swelling and Compression  Therapy    Swelling in the legs can be caused by many reasons. No matter what the reason, treatment usually includes some type of compression. This may be done with a support sock, dressing, ace wrap, or layered wraps.     It is important to treat the swelling for many reasons. If the swelling is not treated you may develop blisters that can lead to ulcerations. This is caused when extra fluid goes into tissue causing damage and blocking blood flow to the tissue.     It is important that you wear your compression every day, including days that you will be seen in clinic.     Compression is often the most important part of treating leg wounds. Without controlling the swelling it is often not possible to heal wounds.     Going without compression for even brief periods of time can be damaging to your legs and your health.  Your compression should be put on first thing in the morning. Take the compression off at night only when instructed by your care provider to do so. Sometimes wearing compression 24 hours a day will be recommended.       If you are having difficulty wearing your compression it is important to notify your care provider so that other options may be reviewed.    Thank you for choosing  [facility]. Please call us if you have any questions [phone].

## 2022-08-12 NOTE — PROGRESS NOTES
[facility]  -  Nurse Visit        Date of Service:  August 12, 2022     Requesting Provider: TOI Green    Diagnosis:     ICD-10-CM    1. Venous stasis ulcer of right lower leg with edema of right lower leg (H)  I83.019     I83.891     L97.919     R60.9    2. Venous stasis ulcer of left lower leg with edema of left lower leg (H)  I83.029     I83.892     L97.929     R60.9    3. Edema of lower extremity  R60.0    4. Venous (peripheral) insufficiency  I87.2        Chief Complaint: Ramu is being seen today at [facility] for his wound(s) and swelling dressing change. They arrive to the clinic today with Patient and spouse. Reports pain of Denies.  Denies any fevers, chills, or generalized ill feeling.     Dressing on Arrival: Hydrofera blue, ABD pad secured with roll gauze and tape, Pt is currently using bilateral 2 layer for compression and did tolerate well. Upon removal of dressings moderate Serosanguinous drainage is noted.    New Wounds noted: No    Vital Signs: BP (!) 140/72   Pulse 76   Temp 98.1  F (36.7  C)   Resp 16     Assessment:      General:  Patient presents to clinic in no apparent distress.   Psychiatric:  Alert and oriented x3.   Lower extremity:  edema is present.    Integumentary:  Skin is WNL    Circumferential volume measures:  Circumferential Measures 8/1/2022 8/5/2022 8/12/2022   Right just above MTP 24.5 24 24.5   Right Ankle 25 27 25.6   Right Widest Calf 36. 35 32.8   Left - just above MTP 25.4 24.5 25   Left Ankle 25.5 29 28.5   Left Widest Calf 42.2 39.5 34.2       Wound info:  Wound (used by OP [facility] only) 03/03/21 1237 Right (Active)       VASC Wound Lt lateral ankle (Active)   Pre Size Length 0.7 08/12/22 1353   Pre Size Width 0.5 08/12/22 1353   Pre Size Depth 0.2 08/12/22 1353   Pre Total Sq cm 0.35 08/12/22 1353       VASC Wound Lt lateral leg (Active)   Pre Size Length 7.9 08/12/22 1353   Pre Size Width  8.1 22 1353   Pre Size Depth 0.2 22 1353   Pre Total Sq cm 63.99 22 1353       VASC Wound Lt lower shin (Active)   Pre Size Length 3 22 1353   Pre Size Width 1.3 22 1353   Pre Size Depth 0.1 22 1353   Pre Total Sq cm 3.9 22 1353       VASC Wound Rt lateral leg (Active)   Pre Size Length 3 22 1353   Pre Size Width 1.4 22 1353   Pre Size Depth 0.1 22 1353   Pre Total Sq cm 4.2 22 1353       Incision/Surgical Site 20 Left Lower Eyelid (Active)       Incision/Surgical Site 22 Abdomen (Active)       Incision/Surgical Site 22 Bilateral Leg (Active)       Undermining is not present.    The periwoundskin is WNL      Plan:         1. Patient will return in 4 days for nurse visit.            2. As listed below, treatment provided irrigation and dressings to promote autolytic debridement and included application of multi-layer compression therapy.             Cleansed with: Dilute hibiclens 30cc in 500cc NS and soap and water    Protected skin with: Remedy skin repair lotion    Dressings Applied to wound: Hydrofera blue,  ABD and Secured with roll gauze and tape.     Compression Applied to the right le-Layer Compression  Compression Applied to the left le-Layer Compression    2-Layer Coban: I Applied the inner foam layer with the foot dorsiflexed and started atthe base of the fifth metatarsal head. I left the bottom of the heel exposed, and proceed by winding the foam up the leg using minimal overlap to just below the fibular head. I then applied the compression layer with the foot dorsiflexed and startingat the base of the fifth metatarsal head. I applied at full stretch and proceeded up the leg using 50% overlap. The bottom of the heel is covered with the compression layer up to the end at the fibular head just below the back of the knee and levelwith the top edge of the foam layer.  I gently pressed and conformed the entire surface  of the system to ensurethat the two layers are firmly bound together      Offloading used: None    Trial Products: No    Provider notified regarding concerns: No    Treatment Changes: No    Tolerated Dressing Change:  Yes    Taught Regarding: follow up appointment(s) and layered compression wraps - maximum wear time of 7 days    Educational Barriers: No barriers      Sandra Black RN

## 2022-08-16 ENCOUNTER — TELEPHONE (OUTPATIENT)
Dept: VASCULAR SURGERY | Facility: CLINIC | Age: 82
End: 2022-08-16

## 2022-08-16 NOTE — TELEPHONE ENCOUNTER
Left message for patient he missed his dressing change apt today at 1100. Advised to call to reschedule.

## 2022-08-19 ENCOUNTER — TELEPHONE (OUTPATIENT)
Dept: VASCULAR SURGERY | Facility: CLINIC | Age: 82
End: 2022-08-19

## 2022-08-19 NOTE — TELEPHONE ENCOUNTER
LMTCB for Ramu that he has a nurse visit today that he did not show up to at 11 am. His last nurse visit was missed and he has 2 layer compression that was applied 7 days ago and it is recommended not to wear for longer than this. If unable to come in he should remove them and put on a another form of compression if he has this. LM with emergency contact [facility] as well.

## 2022-08-23 ENCOUNTER — ALLIED HEALTH/NURSE VISIT (OUTPATIENT)
Dept: VASCULAR SURGERY | Facility: CLINIC | Age: 82
End: 2022-08-23
Attending: NURSE PRACTITIONER
Payer: MEDICARE

## 2022-08-23 VITALS
HEART RATE: 76 BPM | DIASTOLIC BLOOD PRESSURE: 80 MMHG | BODY MASS INDEX: 23.53 KG/M2 | WEIGHT: 164 LBS | RESPIRATION RATE: 16 BRPM | SYSTOLIC BLOOD PRESSURE: 130 MMHG | TEMPERATURE: 98.4 F

## 2022-08-23 DIAGNOSIS — R60.0 EDEMA OF LOWER EXTREMITY: ICD-10-CM

## 2022-08-23 DIAGNOSIS — R60.0 VENOUS STASIS ULCER OF LEFT LOWER LEG WITH EDEMA OF LEFT LOWER LEG (H): ICD-10-CM

## 2022-08-23 DIAGNOSIS — I83.891 VENOUS STASIS ULCER OF RIGHT LOWER LEG WITH EDEMA OF RIGHT LOWER LEG (H): Primary | ICD-10-CM

## 2022-08-23 DIAGNOSIS — I83.019 VENOUS STASIS ULCER OF RIGHT LOWER LEG WITH EDEMA OF RIGHT LOWER LEG (H): Primary | ICD-10-CM

## 2022-08-23 DIAGNOSIS — L97.919 VENOUS STASIS ULCER OF RIGHT LOWER LEG WITH EDEMA OF RIGHT LOWER LEG (H): Primary | ICD-10-CM

## 2022-08-23 DIAGNOSIS — I83.892 VENOUS STASIS ULCER OF LEFT LOWER LEG WITH EDEMA OF LEFT LOWER LEG (H): ICD-10-CM

## 2022-08-23 DIAGNOSIS — L97.929 VENOUS STASIS ULCER OF LEFT LOWER LEG WITH EDEMA OF LEFT LOWER LEG (H): ICD-10-CM

## 2022-08-23 DIAGNOSIS — I83.029 VENOUS STASIS ULCER OF LEFT LOWER LEG WITH EDEMA OF LEFT LOWER LEG (H): ICD-10-CM

## 2022-08-23 DIAGNOSIS — R60.0 VENOUS STASIS ULCER OF RIGHT LOWER LEG WITH EDEMA OF RIGHT LOWER LEG (H): Primary | ICD-10-CM

## 2022-08-23 PROCEDURE — 97602 WOUND(S) CARE NON-SELECTIVE: CPT

## 2022-08-23 ASSESSMENT — PAIN SCALES - GENERAL: PAINLEVEL: NO PAIN (0)

## 2022-08-23 NOTE — LETTER
[facility]  [address] [place] [address] [address], [place] [id number] [phone]      Fax [phone]    [facility]           Fax: [phone]            Customer Service: [phone]    Wound Dressing Rx and Order Form  Order Status: re-order  Verbal: Tsering  Date: 2022     Ramu REIS Dann  Gender: male  : 1940  [address] [facility] [address]  [phone] (home) [phone] (work)    Medical Record: [id number]  Primary Care Provider: Clinic, [place]      ICD-10-CM    1. Venous stasis ulcer of right lower leg with edema of right lower leg (H)  I83.019     I83.891     L97.919     R60.9    2. Venous stasis ulcer of left lower leg with edema of left lower leg (H)  I83.029     I83.892     L97.929     R60.9    3. Edema of lower extremity  R60.0          Insurance Info:  INSURER: Payor: MEDICARE / Plan: MEDICARE / Product Type: Medicare /   Policy ID#:  [id number]  SECONDARY INSURANCE:  [employer]  Secondary Policy ID#:  [id number]        Physician Info:   Name:  STAN JOEL NURSE     Dept Address/Phones:   [address] [place] [address], [facility] [address] [phone]  Fax: [phone]    Lymphedema circumferential measurements (in cm):  Circumferential Measures 2022   Right just above MTP 24.5 24 24.5 24   Right Ankle 25 27 25.6 23.4   Right Widest Calf 36. 35 32.8 32.5   Left - just above MTP 25.4 24.5 25 24   Left Ankle 25.5 29 28.5 23.3   Left Widest Calf 42.2 39.5 34.2 32.3         Wound info:  Wound (used by OP [facility] only) 21 1237 Right (Active)   Number of days: 538       VASC Wound Lt lateral ankle (Active)   Pre Size Length 0.6 22 1000   Pre Size Width 0.3 22 1000   Pre Size Depth 0.2 22 1000   Pre Total Sq cm 0.18 22 1000   Number of days: 22       VASC Wound Lt lateral leg (Active)   Pre Size Length 7.5 22 1000   Pre Size Width 4.4 22 1000   Pre Size Depth 0.2  "08/23/22 1000   Pre Total Sq cm 33 08/23/22 1000   Number of days: 22       VASC Wound Lt lower shin (Active)   Pre Size Length 2.5 08/23/22 1000   Pre Size Width 1.2 08/23/22 1000   Pre Size Depth 0.1 08/23/22 1000   Pre Total Sq cm 3 08/23/22 1000   Number of days: 22       VASC Wound Rt lateral leg (Active)   Pre Size Length 2.6 08/23/22 1000   Pre Size Width 0.8 08/23/22 1000   Pre Size Depth 0.1 08/23/22 1000   Pre Total Sq cm 2.08 08/23/22 1000   Number of days: 22       Incision/Surgical Site 11/04/20 Left Lower Eyelid (Active)   Number of days: 657       Incision/Surgical Site 07/09/22 Abdomen (Active)   Number of days: 45       Incision/Surgical Site 07/09/22 Bilateral Leg (Active)   Number of days: 45        Drainage: moderate  Thickness:  full  Duration of Need: 30 DAYS  Days Supply: 30 DAYS  Start Date: 8/23/22  Starter Kit: none  Qualifying wound/Debridement: Yes      Dressing Type Brand Size Number of pieces Frequency of change    Primary hydrofera blue ready  8\"x8\" 5 2 TIMES PER WEEK and as needed     No substitutions preferred. Call [phone].         OK to forward to covered supplier.    Wound care orders    2 TIMES PER WEEK and as needed, Cleanse your bilateral legs and leg wound(s) with Dilute hibiclens 30cc in 500cc NS.     Pat Dry with non-sterile gauze     Apply Lotion to the intact skin surrounding your wound and other dry skin locations. Some good lotions include: Remedy Skin Repair Cream, Sarna, Vanicream or Cetaphil     Primary Dressing: Apply hydrofera blue ready 8\"x8\" into/onto the wounds; will use silvercel until this arrives; then bring to clinic     Secondary dressing: Cover with ABD     Secure with non-sterile roll gauze (4\" x 75\" roll) and tape (1\" roll tape) as needed; avoid adhesive directly on the skin     Compression: 2 layer bilaterally       Electronically Signed Physician:  STAN [facility] NURSE             Date: August 23, 2022    "

## 2022-08-23 NOTE — PATIENT INSTRUCTIONS
Please call [employer] to find out why you have not received your supplies.   [employer] [facility]  Customer Service: [phone]                                                                     - Please call us if your compression wraps fall more than 1-2 inches below the bend of the knee. Remove the wraps if you experience any shortness of breath or notice your toes turning blue/purple and then give us a call. Call if they are too painful. Call if they get wet. If it is a weekend and the wraps fall down, are too painful, or get wet take the wraps off and put on another form of compression. Compression such as velcro wraps, compression stockings, short stretch bandages, or tubular compression. Apply one of these until you can be seen in clinic. The layered compression wraps are safe to have on for ONLY 7 days. If for some reason you are not able to get the wrap(s) changed (due to illness; lack of supplies, lack of help, lack of transportation) please do the following: unwrap the old 2 or 4 layer compression wrap; avoid using scissors as you could cut your skin and cause wounds; use tubular compression when available. Call to reschedule your home care or clinic visit appointment as soon as possible.     - Treatment:  Layered Compression Bandaging (2-layer)    What is it?  The layered compression bandaging has a layer of absorbent material that will soak up drainage.     Why we do it.   This is done to treat swelling, wounds, or both.  This will in turn help circulation and healing.    How to care for your bandages.  The wraps need to be kept dry. If  the wraps become wet, remove them and call the clinic to have another wrap applied.    What to expect.  It is common for the wraps to be uncomfortable at the beginning. The first two days are usually the hardest; then they will become more comfortable.       Elevating your legs will help the discomfort. Try to elevate your legs as much as  possible.    If rest and elevation does not help your discomfort, call your provider.  If your provider is not available you can remove the wrap and leave a message for further instructions.    - Swelling and Compression Therapy    Swelling in the legs can be caused by many reasons. No matter what the reason, treatment usually includes some type of compression. This may be done with a support sock, dressing, ace wrap, or layered wraps.     It is important to treat the swelling for many reasons. If the swelling is not treated you may develop blisters that can lead to ulcerations. This is caused when extra fluid goes into tissue causing damage and blocking blood flow to the tissue.     It is important that you wear your compression every day, including days that you will be seen in clinic.     Compression is often the most important part of treating leg wounds. Without controlling the swelling it is often not possible to heal wounds.     Going without compression for even brief periods of time can be damaging to your legs and your health.  Your compression should be put on first thing in the morning. Take the compression off at night only when instructed by your care provider to do so. Sometimes wearing compression 24 hours a day will be recommended.       If you are having difficulty wearing your compression it is important to notify your care provider so that other options may be reviewed.    Thank you for choosing  [employer] [facility]. Please call us if you have any questions [phone].

## 2022-08-23 NOTE — NURSING NOTE
[facility]  -  Nurse Visit                      Date of Service:  August 23, 2022     Requesting Provider: TOI Green    Diagnosis:     ICD-10-CM    1. Venous stasis ulcer of right lower leg with edema of right lower leg (H)  I83.019     I83.891     L97.919     R60.9    2. Venous stasis ulcer of left lower leg with edema of left lower leg (H)  I83.029     I83.892     L97.929     R60.9    3. Edema of lower extremity  R60.0        Chief Complaint: Ramu is being seen today at [facility] [place] for his wound(s) and swelling dressing change. They arrive to the clinic today with Patient. Reports pain of 0/10.  Denies any fevers, chills, or generalized ill feeling.     Dressing on Arrival: hydrofera blue ready, Pt is currently using 2 layers for compression and did tolerate well. Upon removal of dressings moderate Serosanguinous drainage is noted.    New Wounds noted: No    Vital Signs: /80   Pulse 76   Temp 98.4  F (36.9  C)   Resp 16   Wt 164 lb (74.4 kg)   BMI 23.53 kg/m      Assessment:      General:  Patient presents to clinic in no apparent distress.   Psychiatric:  Alert and oriented x3.   Lower extremity:  edema is present.    Integumentary:  Skin is WNL    Circumferential volume measures:  Circumferential Measures 8/1/2022 8/5/2022 8/12/2022 8/23/2022   Right just above MTP 24.5 24 24.5 24   Right Ankle 25 27 25.6 23.4   Right Widest Calf 36. 35 32.8 32.5   Left - just above MTP 25.4 24.5 25 24   Left Ankle 25.5 29 28.5 23.3   Left Widest Calf 42.2 39.5 34.2 32.3       Wound info:  Wound (used by OP [facility] only) 03/03/21 1237 Right (Active)       VASC Wound Lt lateral ankle (Active)   Pre Size Length 0.6 08/23/22 1000   Pre Size Width 0.3 08/23/22 1000   Pre Size Depth 0.2 08/23/22 1000   Pre Total Sq cm 0.18 08/23/22 1000       VASC Wound Lt lateral leg (Active)   Pre Size Length 7.5 08/23/22 1000   Pre Size Width 4.4 08/23/22 1000   Pre Size Depth 0.2  22 1000   Pre Total Sq cm 33 22 1000       VASC Wound Lt lower shin (Active)   Pre Size Length 2.5 22 1000   Pre Size Width 1.2 22 1000   Pre Size Depth 0.1 22 1000   Pre Total Sq cm 3 22 1000       VASC Wound Rt lateral leg (Active)   Pre Size Length 2.6 22 1000   Pre Size Width 0.8 22 1000   Pre Size Depth 0.1 22 1000   Pre Total Sq cm 2.08 22 1000       Incision/Surgical Site 20 Left Lower Eyelid (Active)       Incision/Surgical Site 22 Abdomen (Active)       Incision/Surgical Site 22 Bilateral Leg (Active)       Undermining is not present.    The periwoundskin is WNL      Plan:         1. Patient will return in 1 weeks for nurse visit.            2. As listed below, treatment provided irrigation, mechanical cleansing, and dressings to promote autolytic debridement and included application of multi-layer compression therapy.             Cleansed with: Dilute hibiclens 30cc in 500cc NS    Protected skin with: Remedy skin repair lotion    Dressings Applied to wound: hydrofoera blue ready    Compression Applied to the right le-Layer Compression  Compression Applied to the left le-Layer Compression    2-Layer Coban: I Applied the inner foam layer with the foot dorsiflexed and started atthe base of the fifth metatarsal head. I left the bottom of the heel exposed, and proceed by winding the foam up the leg using minimal overlap to just below the fibular head. I then applied the compression layer with the foot dorsiflexed and startingat the base of the fifth metatarsal head. I applied at full stretch and proceeded up the leg using 50% overlap. The bottom of the heel is covered with the compression layer up to the end at the fibular head just below the back of the knee and levelwith the top edge of the foam layer.  I gently pressed and conformed the entire surface of the system to ensurethat the two layers are firmly bound  together      Offloading used: None    Trial Products: No    Provider notified regarding concerns: No    Treatment Changes: No    Tolerated Dressing Change:  Yes    Taught Regarding: follow up appointment(s), wound cares, compression, layered compression wraps - maximum wear time of 7 days and compliance    Educational Barriers: No barriers    Due to recent passing of patients wife, patient is unable to come to clinic 2x/wk per order. Wounds and swelling are showing improvement. He will follow up in 1 week. Supplies were ordered from [employer] 8/1 but pt has yet to receive these. I will contact [employer] and re-order if needed.      CHINEDU MOSER

## 2022-08-30 ENCOUNTER — ALLIED HEALTH/NURSE VISIT (OUTPATIENT)
Dept: VASCULAR SURGERY | Facility: CLINIC | Age: 82
End: 2022-08-30
Attending: NURSE PRACTITIONER
Payer: MEDICARE

## 2022-08-30 VITALS
BODY MASS INDEX: 21.76 KG/M2 | TEMPERATURE: 98.3 F | HEART RATE: 72 BPM | DIASTOLIC BLOOD PRESSURE: 70 MMHG | RESPIRATION RATE: 12 BRPM | WEIGHT: 152 LBS | SYSTOLIC BLOOD PRESSURE: 140 MMHG | HEIGHT: 70 IN

## 2022-08-30 DIAGNOSIS — I83.891 VENOUS STASIS ULCER OF RIGHT LOWER LEG WITH EDEMA OF RIGHT LOWER LEG (H): Primary | ICD-10-CM

## 2022-08-30 DIAGNOSIS — I83.019 VENOUS STASIS ULCER OF RIGHT LOWER LEG WITH EDEMA OF RIGHT LOWER LEG (H): Primary | ICD-10-CM

## 2022-08-30 DIAGNOSIS — L97.919 VENOUS STASIS ULCER OF RIGHT LOWER LEG WITH EDEMA OF RIGHT LOWER LEG (H): Primary | ICD-10-CM

## 2022-08-30 DIAGNOSIS — L97.929 VENOUS STASIS ULCER OF LEFT LOWER LEG WITH EDEMA OF LEFT LOWER LEG (H): ICD-10-CM

## 2022-08-30 DIAGNOSIS — I83.892 VENOUS STASIS ULCER OF LEFT LOWER LEG WITH EDEMA OF LEFT LOWER LEG (H): ICD-10-CM

## 2022-08-30 DIAGNOSIS — R60.0 VENOUS STASIS ULCER OF LEFT LOWER LEG WITH EDEMA OF LEFT LOWER LEG (H): ICD-10-CM

## 2022-08-30 DIAGNOSIS — R60.0 EDEMA OF LOWER EXTREMITY: ICD-10-CM

## 2022-08-30 DIAGNOSIS — I83.029 VENOUS STASIS ULCER OF LEFT LOWER LEG WITH EDEMA OF LEFT LOWER LEG (H): ICD-10-CM

## 2022-08-30 DIAGNOSIS — R60.0 VENOUS STASIS ULCER OF RIGHT LOWER LEG WITH EDEMA OF RIGHT LOWER LEG (H): Primary | ICD-10-CM

## 2022-08-30 PROCEDURE — 97602 WOUND(S) CARE NON-SELECTIVE: CPT

## 2022-08-30 ASSESSMENT — PAIN SCALES - GENERAL: PAINLEVEL: NO PAIN (0)

## 2022-08-30 NOTE — PATIENT INSTRUCTIONS
Call if you need an appointment sooner then 9/7/22.    Bring box from [employer] to next appointment.      Please call [employer] [facility] [employer] to find out why you have not received your supplies.   [employer] [facility] [employer]  Customer Service: [phone]                                                                     - Please call us if your compression wraps fall more than 1-2 inches below the bend of the knee. Remove the wraps if you experience any shortness of breath or notice your toes turning blue/purple and then give us a call. Call if they are too painful. Call if they get wet. If it is a weekend and the wraps fall down, are too painful, or get wet take the wraps off and put on another form of compression. Compression such as velcro wraps, compression stockings, short stretch bandages, or tubular compression. Apply one of these until you can be seen in clinic. The layered compression wraps are safe to have on for ONLY 7 days. If for some reason you are not able to get the wrap(s) changed (due to illness; lack of supplies, lack of help, lack of transportation) please do the following: unwrap the old 2 or 4 layer compression wrap; avoid using scissors as you could cut your skin and cause wounds; use tubular compression when available. Call to reschedule your home care or clinic visit appointment as soon as possible.     - Treatment:  Layered Compression Bandaging (2-layer)    What is it?  The layered compression bandaging has a layer of absorbent material that will soak up drainage.     Why we do it.   This is done to treat swelling, wounds, or both.  This will in turn help circulation and healing.    How to care for your bandages.  The wraps need to be kept dry. If  the wraps become wet, remove them and call the clinic to have another wrap applied.    What to expect.  It is common for the wraps to be uncomfortable at the beginning. The first two days are usually the hardest; then they will become more  comfortable.       Elevating your legs will help the discomfort. Try to elevate your legs as much as possible.    If rest and elevation does not help your discomfort, call your provider.  If your provider is not available you can remove the wrap and leave a message for further instructions.    - Swelling and Compression Therapy    Swelling in the legs can be caused by many reasons. No matter what the reason, treatment usually includes some type of compression. This may be done with a support sock, dressing, ace wrap, or layered wraps.     It is important to treat the swelling for many reasons. If the swelling is not treated you may develop blisters that can lead to ulcerations. This is caused when extra fluid goes into tissue causing damage and blocking blood flow to the tissue.     It is important that you wear your compression every day, including days that you will be seen in clinic.     Compression is often the most important part of treating leg wounds. Without controlling the swelling it is often not possible to heal wounds.     Going without compression for even brief periods of time can be damaging to your legs and your health.  Your compression should be put on first thing in the morning. Take the compression off at night only when instructed by your care provider to do so. Sometimes wearing compression 24 hours a day will be recommended.       If you are having difficulty wearing your compression it is important to notify your care provider so that other options may be reviewed.    Thank you for choosing [facility]. Please call us if you have any questions [phone].

## 2022-08-30 NOTE — PROGRESS NOTES
"  Bilateral leg swelling    Right leg lateral    Left lateral  wound      Left shin    [facility]  -  Nurse Visit        Date of Service:  August 30, 2022     Requesting Provider: TOI Green    Diagnosis:     ICD-10-CM    1. Venous stasis ulcer of right lower leg with edema of right lower leg (H)  I83.019     I83.891     L97.919     R60.9    2. Venous stasis ulcer of left lower leg with edema of left lower leg (H)  I83.029     I83.892     L97.929     R60.9    3. Edema of lower extremity  R60.0        Chief Complaint: Ramu is being seen today at [facility] [place] for his wound(s) and swelling dressing change. They arrive to the clinic today with Patient. Reports pain of 0.  Denies any fevers, chills, or generalized ill feeling. Patient had period of confusion during visit. Patients wife passed away 8/17/22. Has been seeing neurology for memory loss. Weight loss noted. Advised to look for box from [employer] which [employer] has called [facility] and said it was delivered.Advised patient to call if needs compression wraps changed sooner then 9/7/22.    Dressing on Arrival: bilateral 2 layer intact,hydrofera blue ready, abd, to protect shin,roll gauze, Pt is currently using bilateral 2 layer for compression and did tolerate well. Upon removal of dressings light Serous drainage is noted.    New Wounds noted: No    Vital Signs: BP (!) 140/70   Pulse 72   Temp 98.3  F (36.8  C)   Resp 12   Ht 5' 10\" (1.778 m)   Wt 152 lb (68.9 kg)   BMI 21.81 kg/m      Assessment:      General:  Patient presents to clinic in no apparent distress.   Psychiatric:  Alert and oriented x3.   Lower extremity:  edema is present.    Integumentary:  Skin is hemosciderin    Circumferential volume measures:  Circumferential Measures 8/1/2022 8/5/2022 8/12/2022 8/23/2022 8/30/2022   Right just above MTP 24.5 24 24.5 24 23   Right Ankle 25 27 25.6 23.4 25   Right Widest Calf 36. 35 32.8 32.5 31   Left - just " above MTP 25.4 24.5 25 24 23   Left Ankle 25.5 29 28.5 23.3 26   Left Widest Calf 42.2 39.5 34.2 32.3 32       Wound info:  Wound (used by [facility] only) 21 1237 Right (Active)       VASC Wound Lt lateral ankle (Active)   Pre Size Length 0.6 22 1000   Pre Size Width 0.3 22 1000   Pre Size Depth 0.2 22 1000   Pre Total Sq cm 0.18 22 1000   Product Used Alginate 22 1000       VASC Wound Lt lateral leg (Active)   Pre Size Length 7 22 1000   Pre Size Width 3.5 22 1000   Pre Size Depth 0.2 22 1000   Pre Total Sq cm 24.5 22 1000   Product Used Alginate 22 1000       VASC Wound Lt lower shin (Active)   Pre Size Length 3.5 22 1000   Pre Size Width 1.5 22 1000   Pre Size Depth 0.2 22 1000   Pre Total Sq cm 5.25 22 1000   Product Used ABD Pad 22 1000       VASC Wound Rt lateral leg (Active)   Pre Size Length 2.8 22 1000   Pre Size Width 1 22 1000   Pre Size Depth 0.1 22 1000   Pre Total Sq cm 2.8 22 1000   Product Used ABD Pad 22 1000       Incision/Surgical Site 20 Left Lower Eyelid (Active)       Incision/Surgical Site 22 Abdomen (Active)       Incision/Surgical Site 22 Bilateral Leg (Active)       Undermining is not present.    The periwoundskin is pink,hemosciderin      Plan:         1. Patient will return in 8 days for evaluation.            2. As listed below, treatment provided irrigation, mechanical cleansing, and dressings to promote autolytic debridement.             Cleansed with: Dilute hibiclens 30cc in 500cc NS    Protected skin with: Remedy skin repair lotion    Dressings Applied to wound: Silver alginate, ABD and Secured with roll gauze and tape.     Compression Applied to the right le-Layer Compression  Compression Applied to the left le-Layer Compression    2-Layer Coban: I Applied the inner foam layer with the foot dorsiflexed and started atthe base of the  fifth metatarsal head. I left the bottom of the heel exposed, and proceed by winding the foam up the leg using minimal overlap to just below the fibular head. I then applied the compression layer with the foot dorsiflexed and startingat the base of the fifth metatarsal head. I applied at full stretch and proceeded up the leg using 50% overlap. The bottom of the heel is covered with the compression layer up to the end at the fibular head just below the back of the knee and levelwith the top edge of the foam layer.  I gently pressed and conformed the entire surface of the system to ensurethat the two layers are firmly bound together      Offloading used: None    Trial Products: No    Provider notified regarding concerns: No    Treatment Changes: No    Tolerated Dressing Change:  Yes    Taught Regarding: follow up appointment(s), wound cares, wound care supplies, compression, layered compression wraps - maximum wear time of 7 days, elevation and compliance    Educational Barriers: Cognitive/memory loss.      Neri Garcia RN

## 2022-08-30 NOTE — Clinical Note
Will see you 9/7/22- wife passed away 8/17/22, [employer] did send hydrofera patient has memory loss and is seen by neurology. Weight loss noted at nurse visit.

## 2022-09-07 ENCOUNTER — TELEPHONE (OUTPATIENT)
Dept: VASCULAR SURGERY | Facility: CLINIC | Age: 82
End: 2022-09-07

## 2022-09-07 ENCOUNTER — OFFICE VISIT (OUTPATIENT)
Dept: VASCULAR SURGERY | Facility: CLINIC | Age: 82
End: 2022-09-07
Attending: NURSE PRACTITIONER
Payer: MEDICARE

## 2022-09-07 VITALS
RESPIRATION RATE: 14 BRPM | OXYGEN SATURATION: 97 % | DIASTOLIC BLOOD PRESSURE: 80 MMHG | HEART RATE: 66 BPM | BODY MASS INDEX: 21.54 KG/M2 | WEIGHT: 150.1 LBS | SYSTOLIC BLOOD PRESSURE: 124 MMHG

## 2022-09-07 DIAGNOSIS — I87.2 VENOUS (PERIPHERAL) INSUFFICIENCY: ICD-10-CM

## 2022-09-07 DIAGNOSIS — R60.0 VENOUS STASIS ULCER OF LEFT LOWER LEG WITH EDEMA OF LEFT LOWER LEG (H): ICD-10-CM

## 2022-09-07 DIAGNOSIS — I83.029 VENOUS STASIS ULCER OF LEFT LOWER LEG WITH EDEMA OF LEFT LOWER LEG (H): ICD-10-CM

## 2022-09-07 DIAGNOSIS — R60.0 VENOUS STASIS ULCER OF RIGHT LOWER LEG WITH EDEMA OF RIGHT LOWER LEG (H): Primary | ICD-10-CM

## 2022-09-07 DIAGNOSIS — R60.0 EDEMA OF LOWER EXTREMITY: ICD-10-CM

## 2022-09-07 DIAGNOSIS — I83.892 VENOUS STASIS ULCER OF LEFT LOWER LEG WITH EDEMA OF LEFT LOWER LEG (H): ICD-10-CM

## 2022-09-07 DIAGNOSIS — I83.891 VENOUS STASIS ULCER OF RIGHT LOWER LEG WITH EDEMA OF RIGHT LOWER LEG (H): Primary | ICD-10-CM

## 2022-09-07 DIAGNOSIS — I83.019 VENOUS STASIS ULCER OF RIGHT LOWER LEG WITH EDEMA OF RIGHT LOWER LEG (H): Primary | ICD-10-CM

## 2022-09-07 DIAGNOSIS — L97.929 VENOUS STASIS ULCER OF LEFT LOWER LEG WITH EDEMA OF LEFT LOWER LEG (H): ICD-10-CM

## 2022-09-07 DIAGNOSIS — L97.919 VENOUS STASIS ULCER OF RIGHT LOWER LEG WITH EDEMA OF RIGHT LOWER LEG (H): Primary | ICD-10-CM

## 2022-09-07 PROCEDURE — 11044 DBRDMT BONE 1ST 20 SQ CM/<: CPT | Performed by: NURSE PRACTITIONER

## 2022-09-07 PROCEDURE — 11045 DBRDMT SUBQ TISS EACH ADDL: CPT | Performed by: NURSE PRACTITIONER

## 2022-09-07 PROCEDURE — 11042 DBRDMT SUBQ TIS 1ST 20SQCM/<: CPT | Performed by: NURSE PRACTITIONER

## 2022-09-07 RX ORDER — DONEPEZIL HYDROCHLORIDE 5 MG/1
TABLET, FILM COATED ORAL
COMMUNITY
Start: 2022-08-21

## 2022-09-07 ASSESSMENT — PAIN SCALES - GENERAL: PAINLEVEL: NO PAIN (0)

## 2022-09-07 NOTE — PROGRESS NOTES
Follow up Vascular Visit       Date of Service:22      Chief Complaint: nail abnormalities; BLE ulcers and edema      Pt returns to [facility] with regards to their nail abnormalities; BLE ulcers and edema.  They arrive today with brother in law however he stayed in the lobby. They are currently using silvercel to the wounds; we had ordered hydrofera blue dressings; he did not bring this with; states he dropped this off at our clinic; we do not have this dressing here and no record of him dropping this off. This is being done by staff at our clinic twice per week. They are using 2 layers bilaterally for compression. They are feeling well today. Denies fevers, chills. No shortness of breath. His wife recently  and the patient has memory issues.     Allergies:   Allergies   Allergen Reactions     Cats Itching     Dust Mites Itching     Mold Itching     Levaquin [Levofloxacin]      Ankle swelling       Medications:   Current Outpatient Medications:      ADVAIR -21 MCG/ACT inhaler, INHALE 2 PUFFS BY MOUTH TWICE DAILY, Disp: , Rfl:      albuterol (PROAIR HFA/PROVENTIL HFA/VENTOLIN HFA) 108 (90 Base) MCG/ACT inhaler, Inhale 2 puffs into the lungs as needed for shortness of breath / dyspnea or wheezing, Disp: , Rfl:      ascorbic acid 1000 MG TABS tablet, Take 1,000 mg by mouth, Disp: , Rfl:      aspirin (ASA) 81 MG EC tablet, Take 81 mg by mouth daily, Disp: , Rfl:      BREZTRI AEROSPHERE 160-9-4.8 MCG/ACT AERO, INHALE 2 PUFFS BY MOUTH TWICE DAILY, Disp: , Rfl:      calcium carbonate-vitamin D (OS-JOSUE WITH D) 500-200 MG-UNIT tablet, Take 1 tablet by mouth, Disp: , Rfl:      cholecalciferol (DIALYVITE VITAMIN D 5000) 125 MCG (5000 UT) CAPS, Take 2 capsules (10,000 Units) by mouth daily, Disp: 90 capsule, Rfl: 3     donepezil (ARICEPT) 5 MG tablet, TAKE 1 TABLET(5 MG) BY MOUTH EVERY DAY, Disp: , Rfl:      ferrous sulfate (FEROSUL) 325 (65 Fe) MG tablet, Take 325 mg by mouth daily  (with breakfast), Disp: , Rfl:      ketoconazole (NIZORAL A-D) 1 % shampoo, Externally apply topically as needed, Disp: , Rfl:      levothyroxine (SYNTHROID/LEVOTHROID) 100 MCG tablet, Take 100 mcg by mouth daily, Disp: , Rfl:      losartan (COZAAR) 100 MG tablet, Take 100 mg by mouth daily, Disp: , Rfl:      losartan (COZAAR) 25 MG tablet, , Disp: , Rfl:      multivitamin w/minerals (THERA-VIT-M) tablet, Take 1 tablet by mouth, Disp: , Rfl:      vitamin C (ASCORBIC ACID) 1000 MG TABS, Take 1 tablet (1,000 mg) by mouth 2 times daily, Disp: 180 tablet, Rfl: 3    Current Facility-Administered Medications:      lidocaine (XYLOCAINE) 2 % external gel, , Topical, Daily PRN, Deborah Green NP    History:   Past Medical History:   Diagnosis Date     Asthma      COPD (chronic obstructive pulmonary disease) (H)      Essential thrombocytopenia (H)      Graves disease      Hypertension      Hypothyroidism      Lumbar disc herniation      Testosterone deficiency        Physical Exam:    /80   Pulse 66   Resp 14   Wt 150 lb 1.6 oz (68.1 kg)   SpO2 97%   BMI 21.54 kg/m      General:  Patient presents to clinic in no apparent distress.  Head: normocephalic atraumatic  Psychiatric:  Alert and oriented x3.   Respiratory: unlabored breathing; no cough  Integumentary:  Skin is uniformly warm, dry and pink.    Wound #1 Location: left lateral leg  Size: 7.5L x 3W x 0.1depth.  No sinus tract present, Wound base: slough; underneath 100% granulation  No undermining present. Wound is full thickness. There is moderate to heavy drainage. Periwound: no denudement, erythema, induration, maceration or warmth.      Wound #2 Location: left shin  Size: 2.5x1.5x 0.1depth.  No sinus tract present, Wound base: slough; underneath 100% granulation  No undermining present. Wound is full thickness. There is moderate to heavy drainage. Periwound: no denudement, erythema, induration, maceration or warmth.      Wound #3 Location:right lateral leg   Size: 2.4x0.3 x 0.1depth.  No sinus tract present, Wound base: slough; underneath 100% granulation  No undermining present. Wound is full thickness. There is moderate to heavy drainage. Periwound: no denudement, erythema, induration, maceration or warmth.          Wound (used by OP [facility] only) 03/03/21 1237 Right (Active)   Number of days: 553       VASC Wound Lt lateral ankle (Active)   Pre Size Length 0.8 09/07/22 0900   Pre Size Width 0.4 09/07/22 0900   Pre Size Depth 0.2 09/07/22 0900   Pre Total Sq cm 0.32 09/07/22 0900   Product Used Alginate 08/30/22 1000   Number of days: 37       VASC Wound Lt lateral leg (Active)   Pre Size Length 8 09/07/22 0900   Pre Size Width 4 09/07/22 0900   Pre Size Depth 0.2 09/07/22 0900   Pre Total Sq cm 32 09/07/22 0900   Post Size Length 7.5 09/07/22 0900   Post Size Width 3 09/07/22 0900   Post Size Depth 0.1 09/07/22 0900   Post Total Sq cm 22.5 09/07/22 0900   Product Used Alginate 08/30/22 1000   Number of days: 37       VASC Wound Lt lower shin (Active)   Pre Size Length 3.3 09/07/22 0900   Pre Size Width 2.4 09/07/22 0900   Pre Size Depth 0.2 09/07/22 0900   Pre Total Sq cm 7.92 09/07/22 0900   Post Size Length 2.5 09/07/22 0900   Post Size Width 1.5 09/07/22 0900   Post Size Depth 0.1 09/07/22 0900   Post Total Sq cm 3.75 09/07/22 0900   Product Used ABD Pad 08/30/22 1000   Number of days: 37       VASC Wound Rt lateral leg (Active)   Pre Size Length 2.3 09/07/22 0900   Pre Size Width 0.9 09/07/22 0900   Pre Size Depth 0.1 09/07/22 0900   Pre Total Sq cm 2.07 09/07/22 0900   Post Size Length 2.4 09/07/22 0900   Post Size Width 0.3 09/07/22 0900   Post Size Depth 0.1 09/07/22 0900   Post Total Sq cm 0.72 09/07/22 0900   Product Used ABD Pad 08/30/22 1000   Number of days: 37       Incision/Surgical Site 11/04/20 Left Lower Eyelid (Active)   Number of days: 672       Incision/Surgical Site 07/09/22 Abdomen (Active)   Number of days: 60       Incision/Surgical Site 07/09/22  Bilateral Leg (Active)   Number of days: 60            Circumferential volume measures:      Circumferential Measures 8/5/2022 8/12/2022 8/23/2022 8/30/2022 9/7/2022   Right just above MTP 24 24.5 24 23 24.8   Right Ankle 27 25.6 23.4 25 25.1   Right Widest Calf 35 32.8 32.5 31 30.9   Left - just above MTP 24.5 25 24 23 25.2   Left Ankle 29 28.5 23.3 26 25.5   Left Widest Calf 39.5 34.2 32.3 32 30.2       Labs:    I personally reviewed the following lab results today and those on care everywhere    CRP   Date Value Ref Range Status   07/08/2022 5.5 (H) 0.0 - <0.8 mg/dL Final      No results found for: SED   Last Renal Panel:  Sodium   Date Value Ref Range Status   07/10/2022 143 136 - 145 mmol/L Final     Potassium   Date Value Ref Range Status   07/10/2022 4.3 3.5 - 5.0 mmol/L Final     Chloride   Date Value Ref Range Status   07/10/2022 110 (H) 98 - 107 mmol/L Final     Carbon Dioxide (CO2)   Date Value Ref Range Status   07/10/2022 24 22 - 31 mmol/L Final     Anion Gap   Date Value Ref Range Status   07/10/2022 9 5 - 18 mmol/L Final     Glucose   Date Value Ref Range Status   07/10/2022 93 70 - 125 mg/dL Final     GLUCOSE BY METER POCT   Date Value Ref Range Status   07/10/2022 79 70 - 99 mg/dL Final     Urea Nitrogen   Date Value Ref Range Status   07/10/2022 45 (H) 8 - 28 mg/dL Final     Creatinine   Date Value Ref Range Status   07/10/2022 1.81 (H) 0.70 - 1.30 mg/dL Final     GFR Estimate   Date Value Ref Range Status   07/10/2022 37 (L) >60 mL/min/1.73m2 Final     Comment:     Effective December 21, 2021 eGFRcr in adults is calculated using the 2021 CKD-EPI creatinine equation which includes age and gender (Michell palomo al., NEJM, DOI: 10.1056/DMXCoy3429916)     Calcium   Date Value Ref Range Status   07/10/2022 7.9 (L) 8.5 - 10.5 mg/dL Final     Phosphorus   Date Value Ref Range Status   07/08/2022 5.6 (H) 2.5 - 4.5 mg/dL Final     Albumin   Date Value Ref Range Status   07/09/2022 3.0 (L) 3.5 - 5.0 g/dL Final       Lab Results   Component Value Date    WBC 10.9 07/10/2022     Lab Results   Component Value Date    RBC 3.25 07/10/2022     Lab Results   Component Value Date    HGB 10.2 07/10/2022     Lab Results   Component Value Date    HCT 33.6 07/10/2022     No components found for: MCT  Lab Results   Component Value Date     07/10/2022     Lab Results   Component Value Date    MCH 31.4 07/10/2022     Lab Results   Component Value Date    MCHC 30.4 07/10/2022     Lab Results   Component Value Date    RDW 13.6 07/10/2022     Lab Results   Component Value Date     07/10/2022      No results found for: A1C   TSH   Date Value Ref Range Status   07/08/2022 0.49 0.30 - 5.00 uIU/mL Final      No results found for: VITDT                Impression:  Encounter Diagnoses   Name Primary?     Venous stasis ulcer of right lower leg with edema of right lower leg (H) Yes     Venous stasis ulcer of left lower leg with edema of left lower leg (H)      Edema of lower extremity      Venous (peripheral) insufficiency           8/5/22 left lateral ankle         8/5/22 left shin        8/5/22 left latera leg        9/7/2022   Right ankle         9/7/2022 left shin        9/7/2022 lle                Are any of these wounds new today: No; Location: na    Assessment/Plan:          1. Debridement: After discussion of risk factors and verbal consent was obtained 2% Lidocaine HCL jelly was applied, under clean conditions, the BLE ulceration(s) were debrided using currette. Devitalized and nonviable tissue, along with any fibrin and slough, was removed to improve granulation tissue formation, stimulate wound healing, decrease overall bacteria load, disrupt biofilm formation and decrease edge senescence.  Total excisional debridement was 26.97 sq cm from the epidermis/dermis area and into the subcutaneous tissue with a depth of 0.1 cm.   Ulcers were improved afterwards and .  Measures were as noted on the flow sheet.       2.   Wound treatment: wound treatment will include irrigation and dressings to promote autolytic debridement which will include:he does not know where the hydrofera blue is at asked him to look around his home again; he believes he dropped this off at our clinic; I have checked with clinic staff and they do not know anything about this; will use silvercel; until this arrives; will have him come twice per week for dressing changes; wounds are 50% smaller from initial visit.     If for some reason the patient is not able to get their dressing(s) changed as outlined above (due to illness, lack of supplies, lack of help) please do the following: remove old, soiled dressings; wash the wounds with saline; pat dry; apply ABD pad or other absorbant pad and secure with rolled gauze; avoid tape directly on your skin; patient instructed to call the clinic as soon as possible to let us know what the current issues are in receiving wound care. Improved           3. Edema: swelling is down significantly; doing well with the 2 layer; change twice per week; encouraged elevation 4 times a day. The compression wraps were applied today in clinic.     If a 2 layer or 4 layer compression wrap is being used; these are safe to have on for ONLY 7 days. If for some reason the patient is not able to get the wrap(s) changed (due to illness; lack of supplies, lack of help, lack of transportation) please do the following: unwrap the old 2 or 4 layer compression wrap; avoid using scissors as you could cut your skin and cause wounds; use tubular compression when available. Call to reschedule your home care or clinic visit appointment as soon as possible.  Stable            4. Nutrition: focus on protein and low sodium diet           5. Offloading: na            6. Weight loss: pt has had 14 pound weight loss in 1 month; some of this can be attributed to water weight and his edema; he lost 12 cm in circumference in the left leg alone; some can be  attributed to situational due to the recent death of his wife; we will continue to monitor this; I am concerned now that his wife is not cooking for him he is not eating enough.      Patient will follow up with me in 2-3 weeks for reevaluation; NV twice per week. They were instructed to call the clinic sooner with any signs or symptoms of infection or any further questions/concerns. Answered all questions.          Deborah Green DNP, RN, CNP, CWOCN, CFCN, CLT  [facility]   [phone]        This note was electronically signed by Deborah Green, NATHALIE

## 2022-09-07 NOTE — TELEPHONE ENCOUNTER
----- Message from Deborah Green NP sent at 9/7/2022 10:25 AM CDT -----  Regarding: pt safety  Can we get a message out to pt pcp and neurologist that I am concerned about the patient's safety at home now that his wife has passed away. Pt with memory issues. Pt has declined home care and is coming to our clinic twice per week. Can they get a  to evaluate?    Deborah Green DNP, RN, CNP, CWOCN, CFCN, CLT  [facility]  [phone]

## 2022-09-07 NOTE — LETTER
[facility]  [address] [place] [address] [address]  [place], [place] [address]  [phone]      Fax [phone]    [facility]           Fax: [phone]            Customer Service: [phone]    Wound Dressing Rx and Order Form  Order Status: re-order  Verbal: Ilene MINOR/Mamie Chacon CNP  Date: 2022     Ramu Quigley  Gender: male  : 1940  [address] [facility] [address]  [phone] (home)     PLEASE SEND TO CLINIC-[address] [place] [address] [address] [facility] [id number] sent to [facility]     Medical Record: [id number]  Primary Care Provider: Acacia Orlando      ICD-10-CM    1. Venous stasis ulcer of right lower leg with edema of right lower leg (H)  I83.019 DEBRIDE SKIN/SUBQ TISSUE    I83.891 CO DEBRIDEMENT,SUB-Q TISSUE, EA ADDL 20 SQ CM    L97.919     R60.9    2. Venous stasis ulcer of left lower leg with edema of left lower leg (H)  I83.029 DEBRIDE SKIN/SUBQ TISSUE    I83.892 CO DEBRIDEMENT,SUB-Q TISSUE, EA ADDL 20 SQ CM    L97.929     R60.9    3. Edema of lower extremity  R60.0 DEBRIDE SKIN/SUBQ TISSUE     CO DEBRIDEMENT,SUB-Q TISSUE, EA ADDL 20 SQ CM   4. Venous (peripheral) insufficiency  I87.2 DEBRIDE SKIN/SUBQ TISSUE     CO DEBRIDEMENT,SUB-Q TISSUE, EA ADDL 20 SQ CM         Insurance Info:  INSURER: Payor: MEDICARE / Plan: MEDICARE / Product Type: Medicare /   Policy ID#:  [id number]  SECOND[facility]URANCE:  [facility]  Secondary Policy ID#:  [id number]        Physician Info:   Name:  MAMIE CHACON     Dept Address/Phones:   [address] [place] [address], [address]  [facility] [id number] [phone]  Fax: [phone]    Lymphedema circumferential measurements (in cm):  Circumferential Measures 2022   Right just above MTP 24 24.5 24 23 24.8   Right Ankle 27 25.6 23.4 25 25.1   Right Widest Calf 35 32.8 32.5 31 30.9   Left - just above MTP 24.5 25 24 23 25.2   Left Ankle 29 28.5 23.3 26 25.5   Left  Widest Calf 39.5 34.2 32.3 32 30.2         Wound info:  Wound (used by OP [facility] only) 03/03/21 1237 Right (Active)   Number of days: 553       VASC Wound Lt lateral ankle (Active)   Pre Size Length 0.8 09/07/22 0900   Pre Size Width 0.4 09/07/22 0900   Pre Size Depth 0.2 09/07/22 0900   Pre Total Sq cm 0.32 09/07/22 0900   Product Used Alginate 08/30/22 1000   Number of days: 37       VASC Wound Lt lateral leg (Active)   Pre Size Length 8 09/07/22 0900   Pre Size Width 4 09/07/22 0900   Pre Size Depth 0.2 09/07/22 0900   Pre Total Sq cm 32 09/07/22 0900   Post Size Length 7.5 09/07/22 0900   Post Size Width 3 09/07/22 0900   Post Size Depth 0.1 09/07/22 0900   Post Total Sq cm 22.5 09/07/22 0900   Product Used Alginate 08/30/22 1000   Number of days: 37       VASC Wound Lt lower shin (Active)   Pre Size Length 3.3 09/07/22 0900   Pre Size Width 2.4 09/07/22 0900   Pre Size Depth 0.2 09/07/22 0900   Pre Total Sq cm 7.92 09/07/22 0900   Post Size Length 2.5 09/07/22 0900   Post Size Width 1.5 09/07/22 0900   Post Size Depth 0.1 09/07/22 0900   Post Total Sq cm 3.75 09/07/22 0900   Product Used ABD Pad 08/30/22 1000   Number of days: 37       VASC Wound Rt lateral leg (Active)   Pre Size Length 2.3 09/07/22 0900   Pre Size Width 0.9 09/07/22 0900   Pre Size Depth 0.1 09/07/22 0900   Pre Total Sq cm 2.07 09/07/22 0900   Post Size Length 2.4 09/07/22 0900   Post Size Width 0.3 09/07/22 0900   Post Size Depth 0.1 09/07/22 0900   Post Total Sq cm 0.72 09/07/22 0900   Product Used ABD Pad 08/30/22 1000   Number of days: 37       Incision/Surgical Site 11/04/20 Left Lower Eyelid (Active)   Number of days: 672       Incision/Surgical Site 07/09/22 Abdomen (Active)   Number of days: 60       Incision/Surgical Site 07/09/22 Bilateral Leg (Active)   Number of days: 60        Drainage: moderate  Thickness:  full  Duration of Need: 30 DAYS  Days Supply: 30 DAYS  Start Date: 9/7/22  Starter Kit: none  Qualifying wound/Debridement:  "Yes      Dressing Type Brand Size Number of pieces Frequency of change    Primary hydrofera blue ready  8 x 8\" 5 2 times weekly and as needed                                           Secondary                                        Tape                          No substitutions preferred. Call [phone].         OK to forward to covered supplier.    Electronically Signed Physician:  MAMIE CHACON             Date: September 7, 2022  "

## 2022-09-07 NOTE — PATIENT INSTRUCTIONS
"Wound care supplies were ordered today through [employer] and if you are not receiving your supplies or have a question on your bill please contact Johanne Ascencio at [phone]. Please allow 2-5 business days for delivery of supplies.     Elevate the legs for 30 minutes 3-4 times per day    Today a culture was ordered this will take 4-5 days to get results we will call you with these    Keep pressure off the wound area    Take daily Multi vitamin     Take 1 premier protein shake per day      Wound Care Instructions    2 TIMES PER WEEK and as needed, Cleanse your bilateral legs and leg wound(s) with Dilute hibiclens 30cc in 500cc NS.    Pat Dry with non-sterile gauze    Apply Lotion to the intact skin surrounding your wound and other dry skin locations. Some good lotions include: Remedy Skin Repair Cream, Sarna, Vanicream or Cetaphil    Primary Dressing: Apply hydrofera blue ready into/onto the wounds; will use silvercel until this arrives; then bring to clinic    Secondary dressing: Cover with ABD    Secure with non-sterile roll gauze (4\" x 75\" roll) and tape (1\" roll tape) as needed; avoid adhesive directly on the skin    Compression: 2 layer bilaterally    It is not ok to get your wound wet in the bath or shower      If for some reason you are not able to get your dressing(s) changed as outlined above (due to illness, lack of supplies, lack of help) please do the following: remove old, soiled dressings; wash the wounds with saline; pat dry; apply ABD pad or other absorbant pad and secure with rolled gauze; avoid tape directly on your skin; Call the clinic as soon as possible to let us know what the current issues are in receiving wound care [phone].      SEEK MEDICAL CARE IF:  You have an increase in swelling, pain, or redness around the wound.  You have an increase in the amount of pus coming from the wound.  There is a bad smell coming from the wound.  The wound appears to be worsening/enlarging  You have a " fever greater than 101.5 F      It is ok to continue current wound care treatment/products for the next 2-3 days until new wound care supplies are ordered and arrive. If longer than this please contact our office at [phone].    If you have a 2 layer or 4 layer compression wrap on these are safe to have on for ONLY 7 days. If for some reason you are not able to get the wrap(s) changed (due to illness; lack of supplies, lack of help, lack of transportation) please do the following: unwrap the old 2 or 4 layer compression wrap; avoid using scissors as you could cut your skin and cause wounds; use tubular compression when available. Call to reschedule your home care or clinic visit appointment as soon as possible.    Please NOTE: if you are 15 minutes late to your clinic appointment you will have to be rescheduled. Please call our clinic as soon as possible if you know you will not be able to get to your appointment at [phone].    If you fail to show up to 3 scheduled clinic appointments you will be dismissed from our clinic.        It is recommended that you do not get your ulcer wet when showering.  Listed below are several ways of keeping it dry when you shower.     1. Wrap it with [employer] Seal plastic wrap.  It can be found in the stores where the plastic wraps or tin foil is kept.               2.  Some people take a bath and hang their leg/foot out of the tub.                        3  Put your leg in a plastic bag and tape it on.           4. You can purchase a shower cover for casts at some pharmacies and through the Internet.            5. Take a Bed Bath or wash up at the sink            We want to hear from you!  In the next few weeks, you should receive a call or email to complete a survey about your visit at [facility]. Please help us improve your appointment experience by letting us know how we did today. We strive to make your experience good and value any ways in which we  could do better.      We value your input and suggestions.    Thank you for choosing the [facility]!

## 2022-09-12 ENCOUNTER — ALLIED HEALTH/NURSE VISIT (OUTPATIENT)
Dept: VASCULAR SURGERY | Facility: CLINIC | Age: 82
End: 2022-09-12
Attending: NURSE PRACTITIONER
Payer: MEDICARE

## 2022-09-12 VITALS
TEMPERATURE: 98.2 F | SYSTOLIC BLOOD PRESSURE: 152 MMHG | DIASTOLIC BLOOD PRESSURE: 84 MMHG | HEART RATE: 72 BPM | RESPIRATION RATE: 16 BRPM

## 2022-09-12 DIAGNOSIS — I83.891 VENOUS STASIS ULCER OF RIGHT LOWER LEG WITH EDEMA OF RIGHT LOWER LEG (H): Primary | ICD-10-CM

## 2022-09-12 DIAGNOSIS — I83.892 VENOUS STASIS ULCER OF LEFT LOWER LEG WITH EDEMA OF LEFT LOWER LEG (H): ICD-10-CM

## 2022-09-12 DIAGNOSIS — R60.0 VENOUS STASIS ULCER OF LEFT LOWER LEG WITH EDEMA OF LEFT LOWER LEG (H): ICD-10-CM

## 2022-09-12 DIAGNOSIS — R60.0 VENOUS STASIS ULCER OF RIGHT LOWER LEG WITH EDEMA OF RIGHT LOWER LEG (H): Primary | ICD-10-CM

## 2022-09-12 DIAGNOSIS — L97.929 VENOUS STASIS ULCER OF LEFT LOWER LEG WITH EDEMA OF LEFT LOWER LEG (H): ICD-10-CM

## 2022-09-12 DIAGNOSIS — L97.919 VENOUS STASIS ULCER OF RIGHT LOWER LEG WITH EDEMA OF RIGHT LOWER LEG (H): Primary | ICD-10-CM

## 2022-09-12 DIAGNOSIS — I83.019 VENOUS STASIS ULCER OF RIGHT LOWER LEG WITH EDEMA OF RIGHT LOWER LEG (H): Primary | ICD-10-CM

## 2022-09-12 DIAGNOSIS — I83.029 VENOUS STASIS ULCER OF LEFT LOWER LEG WITH EDEMA OF LEFT LOWER LEG (H): ICD-10-CM

## 2022-09-12 PROCEDURE — 97602 WOUND(S) CARE NON-SELECTIVE: CPT

## 2022-09-12 ASSESSMENT — PAIN SCALES - GENERAL: PAINLEVEL: NO PAIN (0)

## 2022-09-12 NOTE — PATIENT INSTRUCTIONS
- Please call us if your compression wraps fall more than 1-2 inches below the bend of the knee. Remove the wraps if you experience any shortness of breath or notice your toes turning blue/purple and then give us a call. Call if they are too painful. Call if they get wet. If it is a weekend and the wraps fall down, are too painful, or get wet take the wraps off and put on another form of compression. Compression such as velcro wraps, compression stockings, short stretch bandages, or tubular compression. Apply one of these until you can be seen in clinic. The layered compression wraps are safe to have on for ONLY 7 days. If for some reason you are not able to get the wrap(s) changed (due to illness; lack of supplies, lack of help, lack of transportation) please do the following: unwrap the old 2 or 4 layer compression wrap; avoid using scissors as you could cut your skin and cause wounds; use tubular compression when available. Call to reschedule your home care or clinic visit appointment as soon as possible.     - Treatment:  Layered Compression Bandaging (2-layer)    What is it?  The layered compression bandaging has a layer of absorbent material that will soak up drainage.     Why we do it.   This is done to treat swelling, wounds, or both.  This will in turn help circulation and healing.    How to care for your bandages.  The wraps need to be kept dry. If  the wraps become wet, remove them and call the clinic to have another wrap applied.    What to expect.  It is common for the wraps to be uncomfortable at the beginning. The first two days are usually the hardest; then they will become more comfortable.       Elevating your legs will help the discomfort. Try to elevate your legs as much as possible.    If rest and elevation does not help your discomfort, call your provider.  If your provider is not available you can remove the wrap and leave a message for further instructions.    - Swelling and Compression  Therapy    Swelling in the legs can be caused by many reasons. No matter what the reason, treatment usually includes some type of compression. This may be done with a support sock, dressing, ace wrap, or layered wraps.     It is important to treat the swelling for many reasons. If the swelling is not treated you may develop blisters that can lead to ulcerations. This is caused when extra fluid goes into tissue causing damage and blocking blood flow to the tissue.     It is important that you wear your compression every day, including days that you will be seen in clinic.     Compression is often the most important part of treating leg wounds. Without controlling the swelling it is often not possible to heal wounds.     Going without compression for even brief periods of time can be damaging to your legs and your health.  Your compression should be put on first thing in the morning. Take the compression off at night only when instructed by your care provider to do so. Sometimes wearing compression 24 hours a day will be recommended.       If you are having difficulty wearing your compression it is important to notify your care provider so that other options may be reviewed.    Thank you for choosing  [facility]. Please call us if you have any questions [phone].

## 2022-09-12 NOTE — PROGRESS NOTES
[facility]  -  Nurse Visit                        Date of Service:  September 12, 2022     Requesting Provider: TOI Green    Diagnosis:     ICD-10-CM    1. Venous stasis ulcer of right lower leg with edema of right lower leg (H)  I83.019     I83.891     L97.919     R60.9    2. Venous stasis ulcer of left lower leg with edema of left lower leg (H)  I83.029     I83.892     L97.929     R60.9        Chief Complaint: Ramu is being seen today at [facility] [place] for his wound(s) and swelling dressing change. They arrive to the clinic today with Patient. Reports pain of 0.  Denies any fevers, chills, or generalized ill feeling.     Dressing on Arrival: CDI, Pt is currently using 2 layer for compression and did tolerate well. Upon removal of dressings moderate Serous drainage is noted.    New Wounds noted: No    Vital Signs: BP (!) 152/84   Pulse 72   Temp 98.2  F (36.8  C) (Oral)   Resp 16     Assessment:      General:  Patient presents to clinic in no apparent distress.   Psychiatric:  Alert and oriented x3.   Lower extremity:  edema is present.    Integumentary:  Skin is WNL    Circumferential volume measures:  Circumferential Measures 8/5/2022 8/12/2022 8/23/2022 8/30/2022 9/7/2022   Right just above MTP 24 24.5 24 23 24.8   Right Ankle 27 25.6 23.4 25 25.1   Right Widest Calf 35 32.8 32.5 31 30.9   Left - just above MTP 24.5 25 24 23 25.2   Left Ankle 29 28.5 23.3 26 25.5   Left Widest Calf 39.5 34.2 32.3 32 30.2       Wound info:  Wound (used by OP [facility] only) 03/03/21 1237 Right (Active)       VASC Wound Lt lateral ankle (Active)   Pre Size Length 0.2 09/12/22 1000   Pre Size Width 0.2 09/12/22 1000   Pre Size Depth 0.1 09/12/22 1000   Pre Total Sq cm 0.04 09/12/22 1000   Product Used Alginate 08/30/22 1000       VASC Wound Lt lateral leg (Active)   Pre Size Length 7.8 09/12/22 1000   Pre Size Width 3 09/12/22 1000   Pre Size Depth 0.2 09/12/22 1000   Pre Total Sq  cm 23.4 22 1000   Post Size Length 7.5 22 0900   Post Size Width 3 22 0900   Post Size Depth 0.1 22 0900   Post Total Sq cm 22.5 22 0900   Product Used Alginate 22 1000       VASC Wound Lt lower shin (Active)   Pre Size Length 2.9 22 1000   Pre Size Width 1.7 22 1000   Pre Size Depth 0.2 22 1000   Pre Total Sq cm 4.93 22 1000   Post Size Length 2.5 22 0900   Post Size Width 1.5 22 0900   Post Size Depth 0.1 22 0900   Post Total Sq cm 3.75 22 0900   Product Used ABD Pad 22 1000       VASC Wound Rt lateral leg (Active)   Pre Size Length 1.9 22 1000   Pre Size Width 0.4 22 1000   Pre Size Depth 0.1 22 1000   Pre Total Sq cm 0.76 22 1000   Post Size Length 2.4 22 0900   Post Size Width 0.3 22 0900   Post Size Depth 0.1 22 0900   Post Total Sq cm 0.72 22 0900   Product Used ABD Pad 22 1000       Incision/Surgical Site 20 Left Lower Eyelid (Active)       Incision/Surgical Site 22 Abdomen (Active)       Incision/Surgical Site 22 Bilateral Leg (Active)       Undermining is not present.    The periwoundskin is WNL      Plan:         1. Patient will return in 3 days for nurse visit.            2. As listed below, treatment provided irrigation, mechanical cleansing, and dressings to promote autolytic debridement and included application of multi-layer compression therapy.             Cleansed with: Dilute hibiclens 30cc in 500cc NS    Protected skin with: Remedy skin repair lotion    Dressings Applied to wound: ABD, Secured with roll gauze and tape.  and Hydrofera blue    Compression Applied to the right le-Layer Compression  Compression Applied to the left le-Layer Compression    2-Layer Coban: I Applied the inner foam layer with the foot dorsiflexed and started atthe base of the fifth metatarsal head. I left the bottom of the heel exposed, and proceed by  winding the foam up the leg using minimal overlap to just below the fibular head. I then applied the compression layer with the foot dorsiflexed and startingat the base of the fifth metatarsal head. I applied at full stretch and proceeded up the leg using 50% overlap. The bottom of the heel is covered with the compression layer up to the end at the fibular head just below the back of the knee and levelwith the top edge of the foam layer.  I gently pressed and conformed the entire surface of the system to ensurethat the two layers are firmly bound together      Offloading used: None    Trial Products: No    Provider notified regarding concerns: No    Treatment Changes: No    Tolerated Dressing Change:  Yes    Taught Regarding: follow up appointment(s), wound cares and compression    Educational Barriers: No barriers      Deborah Mayen RN

## 2022-09-15 ENCOUNTER — ALLIED HEALTH/NURSE VISIT (OUTPATIENT)
Dept: VASCULAR SURGERY | Facility: CLINIC | Age: 82
End: 2022-09-15
Attending: NURSE PRACTITIONER
Payer: MEDICARE

## 2022-09-15 VITALS
RESPIRATION RATE: 12 BRPM | TEMPERATURE: 97.8 F | SYSTOLIC BLOOD PRESSURE: 148 MMHG | DIASTOLIC BLOOD PRESSURE: 88 MMHG | HEART RATE: 76 BPM

## 2022-09-15 DIAGNOSIS — R60.0 VENOUS STASIS ULCER OF LEFT LOWER LEG WITH EDEMA OF LEFT LOWER LEG (H): ICD-10-CM

## 2022-09-15 DIAGNOSIS — L97.929 VENOUS STASIS ULCER OF LEFT LOWER LEG WITH EDEMA OF LEFT LOWER LEG (H): ICD-10-CM

## 2022-09-15 DIAGNOSIS — I83.029 VENOUS STASIS ULCER OF LEFT LOWER LEG WITH EDEMA OF LEFT LOWER LEG (H): ICD-10-CM

## 2022-09-15 DIAGNOSIS — I83.891 VENOUS STASIS ULCER OF RIGHT LOWER LEG WITH EDEMA OF RIGHT LOWER LEG (H): Primary | ICD-10-CM

## 2022-09-15 DIAGNOSIS — I83.019 VENOUS STASIS ULCER OF RIGHT LOWER LEG WITH EDEMA OF RIGHT LOWER LEG (H): Primary | ICD-10-CM

## 2022-09-15 DIAGNOSIS — L97.919 VENOUS STASIS ULCER OF RIGHT LOWER LEG WITH EDEMA OF RIGHT LOWER LEG (H): Primary | ICD-10-CM

## 2022-09-15 DIAGNOSIS — I83.892 VENOUS STASIS ULCER OF LEFT LOWER LEG WITH EDEMA OF LEFT LOWER LEG (H): ICD-10-CM

## 2022-09-15 DIAGNOSIS — R60.0 VENOUS STASIS ULCER OF RIGHT LOWER LEG WITH EDEMA OF RIGHT LOWER LEG (H): Primary | ICD-10-CM

## 2022-09-15 PROCEDURE — 97602 WOUND(S) CARE NON-SELECTIVE: CPT

## 2022-09-15 ASSESSMENT — PAIN SCALES - GENERAL: PAINLEVEL: NO PAIN (0)

## 2022-09-15 NOTE — PROGRESS NOTES
[facility]  -  Nurse Visit        Date of Service:  September 15, 2022     Requesting Provider: TOI Green    Diagnosis:     ICD-10-CM    1. Venous stasis ulcer of right lower leg with edema of right lower leg (H)  I83.019     I83.891     L97.919     R60.9    2. Venous stasis ulcer of left lower leg with edema of left lower leg (H)  I83.029     I83.892     L97.929     R60.9        Zac LE       R lateral leg      L lateral leg      L shin    Chief Complaint: Ramu is being seen today at [facility] [place] for his swelling and wounds dressing change. They arrive to the clinic today alone. Reports pain of 0 out of 10.  Denies any fevers, chills, or generalized ill feeling.     Dressing on Arrival: Dydrofera blue ready to bilateral leg wounds. Pt is currently using regular 2 layer  for compression and did tolerate well. Fell about 3 inches and educated he should call if this happens again. Upon removal of dressings moderate serosanguinous drainage is noted.    New Wounds noted: No    Vital Signs: BP (!) 148/88   Pulse 76   Temp 97.8  F (36.6  C) (Oral)   Resp 12     Assessment:      General:  Patient presents to clinic in no apparent distress.   Psychiatric:  Alert and oriented x3.   Lower extremity:  edema is present.    Integumentary:  Skin is warm and dry    Circumferential volume measures:  Circumferential Measures 8/5/2022 8/12/2022 8/23/2022 8/30/2022 9/7/2022   Right just above MTP 24 24.5 24 23 24.8   Right Ankle 27 25.6 23.4 25 25.1   Right Widest Calf 35 32.8 32.5 31 30.9   Left - just above MTP 24.5 25 24 23 25.2   Left Ankle 29 28.5 23.3 26 25.5   Left Widest Calf 39.5 34.2 32.3 32 30.2       Wound info:  Wound (used by OP [facility] only) 03/03/21 1237 Right (Active)       VASC Wound Lt lateral ankle (Active)   Pre Size Length 0.2 09/12/22 1000   Pre Size Width 0.2 09/12/22 1000   Pre Size Depth 0.1 09/12/22 1000   Pre Total Sq cm 0.04 09/12/22 1000   Product Used  Alginate 22 1000       VASC Wound Lt lateral leg (Active)   Pre Size Length 7 09/15/22 0900   Pre Size Width 3 09/15/22 0900   Pre Size Depth 0.1 09/15/22 0900   Pre Total Sq cm 21 09/15/22 0900   Post Size Length 7.5 22 0900   Post Size Width 3 22 0900   Post Size Depth 0.1 22 0900   Post Total Sq cm 22.5 22 0900   Product Used Alginate 22 1000       VASC Wound Lt lower shin (Active)   Pre Size Length 2 09/15/22 0900   Pre Size Width 1.5 09/15/22 0900   Pre Size Depth 0.1 09/15/22 0900   Pre Total Sq cm 3 09/15/22 0900   Post Size Length 2.5 22 0900   Post Size Width 1.5 22 0900   Post Size Depth 0.1 22 0900   Post Total Sq cm 3.75 22 0900   Product Used ABD Pad 22 1000       VASC Wound Rt lateral leg (Active)   Pre Size Length 1.6 09/15/22 0900   Pre Size Width 0.4 09/15/22 0900   Pre Size Depth 0.1 09/15/22 0900   Pre Total Sq cm 0.76 22 1000   Post Size Length 2.4 22 0900   Post Size Width 0.3 22 0900   Post Size Depth 0.1 22 0900   Post Total Sq cm 0.72 22 0900   Product Used ABD Pad 22 1000       Incision/Surgical Site 20 Left Lower Eyelid (Active)       Incision/Surgical Site 22 Abdomen (Active)       Incision/Surgical Site 22 Bilateral Leg (Active)       Undermining is not present.    The periwoundskin is pink and macerated      Plan:         1. Patient will return on 22 for nurse visit.            2. As listed below, treatment provided irrigation, mechanical cleansing, and dressings to promote autolytic debridement and included application of multi-layer compression therapy.             Cleansed with: Dilute hibiclens 30cc in 500cc NS    Protected skin with: Remedy skin repair lotion    Dressings Applied to wound: Hydrofera blue ready, ABD pad    Compression Applied to the right le-Layer Compression  Compression Applied to the left le-Layer Compression    2-Layer Coban: I  Applied the inner foam layer with the foot dorsiflexed and started atthe base of the fifth metatarsal head. I left the bottom of the heel exposed, and proceed by winding the foam up the leg using minimal overlap to just below the fibular head. I then applied the compression layer with the foot dorsiflexed and startingat the base of the fifth metatarsal head. I applied at full stretch and proceeded up the leg using 50% overlap. The bottom of the heel is covered with the compression layer up to the end at the fibular head just below the back of the knee and levelwith the top edge of the foam layer.  I gently pressed and conformed the entire surface of the system to ensurethat the two layers are firmly bound together      Offloading used: None    Trial Products: No    Provider notified regarding concerns: No    Treatment Changes: No    Tolerated Dressing Change:  Yes    Taught Regarding: follow up appointment(s)    Educational Barriers: alvin JUDD RN

## 2022-09-15 NOTE — PATIENT INSTRUCTIONS
- Please call us if your compression wraps fall more than 1-2 inches below the bend of the knee. Remove the wraps if you experience any shortness of breath or notice your toes turning blue/purple and then give us a call. Call if they are too painful. Call if they get wet. If it is a weekend and the wraps fall down, are too painful, or if they get wet, you should take the wraps off and put on another form of compression. Compression such as velcro wraps, compression stockings, short stretch bandages, or tubular compression. Apply one of these until you can be seen in clinic. Please call us if you have any questions [phone].    - Treatment:  Layered Compression Bandaging (2-layer)    What is it?  The layered compression bandaging has a layer of absorbent material that will soak up drainage.     Why we do it.   This is done to treat swelling, wounds, or both.  This will in turn help circulation and healing.    How to care for your bandages.  The wraps need to be kept dry. If  the wraps become wet, remove them and call the clinic to have another wrap applied.    What to expect.  It is common for the wraps to be uncomfortable at the beginning. The first two days are usually the hardest; then they will become more comfortable.       Elevating your legs will help the discomfort. Try to elevate your legs as much as possible.    If rest and elevation does not help your discomfort, call your provider.  If your provider is not available you can remove the wrap and leave a message for further instructions.    - Swelling and Compression Therapy    Swelling in the legs can be caused by many reasons. No matter what the reason, treatment usually includes some type of compression. This may be done with a support sock, dressing, ace wrap, or layered wraps.     It is important to treat the swelling for many reasons. If the swelling is not treated you may develop blisters that can lead to ulcerations. This is caused when extra  fluid goes into tissue causing damage and blocking blood flow to the tissue.     It is important that you wear your compression every day, including days that you will be seen in clinic.     Compression is often the most important part of treating leg wounds. Without controlling the swelling it is often not possible to heal wounds.     Going without compression for even brief periods of time can be damaging to your legs and your health.  Your compression should be put on first thing in the morning. Take the compression off at night only when instructed by your care provider to do so. Sometimes wearing compression 24 hours a day will be recommended.       If you are having difficulty wearing your compression it is important to notify your care provider so that other options may be reviewed.    Thank you for choosing [facility]. Please call us if you have any questions [phone].

## 2022-09-19 ENCOUNTER — ALLIED HEALTH/NURSE VISIT (OUTPATIENT)
Dept: VASCULAR SURGERY | Facility: CLINIC | Age: 82
End: 2022-09-19
Attending: NURSE PRACTITIONER
Payer: MEDICARE

## 2022-09-19 VITALS
RESPIRATION RATE: 16 BRPM | TEMPERATURE: 99 F | HEART RATE: 78 BPM | DIASTOLIC BLOOD PRESSURE: 78 MMHG | SYSTOLIC BLOOD PRESSURE: 138 MMHG

## 2022-09-19 DIAGNOSIS — R60.0 VENOUS STASIS ULCER OF LEFT LOWER LEG WITH EDEMA OF LEFT LOWER LEG (H): ICD-10-CM

## 2022-09-19 DIAGNOSIS — L97.929 VENOUS STASIS ULCER OF LEFT LOWER LEG WITH EDEMA OF LEFT LOWER LEG (H): ICD-10-CM

## 2022-09-19 DIAGNOSIS — I83.891 VENOUS STASIS ULCER OF RIGHT LOWER LEG WITH EDEMA OF RIGHT LOWER LEG (H): Primary | ICD-10-CM

## 2022-09-19 DIAGNOSIS — R60.0 VENOUS STASIS ULCER OF RIGHT LOWER LEG WITH EDEMA OF RIGHT LOWER LEG (H): Primary | ICD-10-CM

## 2022-09-19 DIAGNOSIS — I83.892 VENOUS STASIS ULCER OF LEFT LOWER LEG WITH EDEMA OF LEFT LOWER LEG (H): ICD-10-CM

## 2022-09-19 DIAGNOSIS — I83.019 VENOUS STASIS ULCER OF RIGHT LOWER LEG WITH EDEMA OF RIGHT LOWER LEG (H): Primary | ICD-10-CM

## 2022-09-19 DIAGNOSIS — L97.919 VENOUS STASIS ULCER OF RIGHT LOWER LEG WITH EDEMA OF RIGHT LOWER LEG (H): Primary | ICD-10-CM

## 2022-09-19 DIAGNOSIS — I83.029 VENOUS STASIS ULCER OF LEFT LOWER LEG WITH EDEMA OF LEFT LOWER LEG (H): ICD-10-CM

## 2022-09-19 PROCEDURE — 97602 WOUND(S) CARE NON-SELECTIVE: CPT

## 2022-09-19 ASSESSMENT — PAIN SCALES - GENERAL: PAINLEVEL: MILD PAIN (2)

## 2022-09-19 NOTE — PATIENT INSTRUCTIONS
- Please call us if your compression wraps fall more than 1-2 inches below the bend of the knee. Remove the wraps if you experience any shortness of breathe or notice your toes turning blue/purple and then give us a call. Call if they are too painful. Call if they get wet. If it is a weekend and the wraps fall down, are too painful, or get wet take the wraps off and put on another form of compression. Compression such as velcro wraps, compression stockings, short stretch bandages, or tubular compression. Apply one of these until you can be seen in clinic. Please call us if you have any questions [phone].    - Treatment:  Layered Compression Bandaging (2-layer)    What is it?  The layered compression bandaging has a layer of absorbent material that will soak up drainage.     Why we do it.   This is done to treat swelling, wounds, or both.  This will in turn help circulation and healing.    How to care for your bandages.  The wraps need to be kept dry. If  the wraps become wet, remove them and call the clinic to have another wrap applied.    What to expect.  It is common for the wraps to be uncomfortable at the beginning. The first two days are usually the hardest; then they will become more comfortable.       Elevating your legs will help the discomfort. Try to elevate your legs as much as possible.    If rest and elevation does not help your discomfort, call your provider.  If your provider is not available you can remove the wrap and leave a message for further instructions.    - Swelling and Compression Therapy    Swelling in the legs can be caused by many reasons. No matter what the reason, treatment usually includes some type of compression. This may be done with a support sock, dressing, ace wrap, or layered wraps.     It is important to treat the swelling for many reasons. If the swelling is not treated you may develop blisters that can lead to ulcerations. This is caused when extra fluid goes into tissue  causing damage and blocking blood flow to the tissue.     It is important that you wear your compression every day, including days that you will be seen in clinic.     Compression is often the most important part of treating leg wounds. Without controlling the swelling it is often not possible to heal wounds.     Going without compression for even brief periods of time can be damaging to your legs and your health.  Your compression should be put on first thing in the morning. Take the compression off at night only when instructed by your care provider to do so. Sometimes wearing compression 24 hours a day will be recommended.       If you are having difficulty wearing your compression it is important to notify your care provider so that other options may be reviewed.    Thank you for choosing [facility]. Please call us if you have any questions [phone].

## 2022-09-19 NOTE — PROGRESS NOTES
[facility]  -  Nurse Visit        Date of Service:  September 19, 2022     Requesting Provider: TOI Green    Diagnosis:     ICD-10-CM    1. Venous stasis ulcer of right lower leg with edema of right lower leg (H)  I83.019     I83.891     L97.919     R60.9    2. Venous stasis ulcer of left lower leg with edema of left lower leg (H)  I83.029     I83.892     L97.929     R60.9          Right lateral leg 9/19        Left lateral ankle 9/19        Left anterior lower leg 9/19        Bilateral legs 9/19    Chief Complaint: Ramu is being seen today at [facility] [place] for his bilateral leg wound(s) and dressing change. They arrive to the clinic alone. Reports pain of 0.  Denies any fevers, chills, or generalized ill feeling.     Dressing on Arrival: Bilateral 2-layer. Left leg wrap had rolled down slightly from top. Pt is currently using 2-layer for compression and did tolerate well however he expresses that he wish he did not have to wear them. Discussed with Ramu how compression helps with wound healing. Upon removal of dressings moderate serosanguinous drainage is noted to left ankle wound and scant amount to left lower leg and right lateral leg.    New Wounds noted: No    Vital Signs: /78 (BP Location: Left arm, Patient Position: Sitting)   Pulse 78   Temp 99  F (37.2  C) (Temporal)   Resp 16     Assessment:      General:  Patient presents to clinic in no apparent distress.   Psychiatric:  Alert and oriented x3.   Lower extremity:  edema is present.    Integumentary:  Skin is WNL    Circumferential volume measures:  Circumferential Measures 8/5/2022 8/12/2022 8/23/2022 8/30/2022 9/7/2022   Right just above MTP 24 24.5 24 23 24.8   Right Ankle 27 25.6 23.4 25 25.1   Right Widest Calf 35 32.8 32.5 31 30.9   Left - just above MTP 24.5 25 24 23 25.2   Left Ankle 29 28.5 23.3 26 25.5   Left Widest Calf 39.5 34.2 32.3 32 30.2       Wound info:  Wound (used by OP  [facility] only) 03/03/21 1237 Right (Active)       VASC Wound Lt lateral ankle (Active)   Pre Size Length 0 09/15/22 0900   Pre Size Width 0 09/15/22 0900   Pre Size Depth 0 09/15/22 0900   Pre Total Sq cm 0 09/15/22 0900   Product Used Alginate 08/30/22 1000       VASC Wound Lt lateral leg (Active)   Pre Size Length 6 09/19/22 1100   Pre Size Width 2 09/19/22 1100   Pre Size Depth 0.1 09/19/22 1100   Pre Total Sq cm 12 09/19/22 1100   Post Size Length 7.5 09/07/22 0900   Post Size Width 3 09/07/22 0900   Post Size Depth 0.1 09/07/22 0900   Post Total Sq cm 22.5 09/07/22 0900   Product Used Alginate 08/30/22 1000       VASC Wound Lt lower shin (Active)   Pre Size Length 2 09/19/22 1100   Pre Size Width 1 09/19/22 1100   Pre Size Depth 0.1 09/19/22 1100   Pre Total Sq cm 2 09/19/22 1100   Post Size Length 2.5 09/07/22 0900   Post Size Width 1.5 09/07/22 0900   Post Size Depth 0.1 09/07/22 0900   Post Total Sq cm 3.75 09/07/22 0900   Product Used ABD Pad 08/30/22 1000       VASC Wound Rt lateral leg (Active)   Pre Size Length 1 09/19/22 1100   Pre Size Width 0.2 09/19/22 1100   Pre Size Depth 0.1 09/19/22 1100   Pre Total Sq cm 0.2 09/19/22 1100   Post Size Length 2.4 09/07/22 0900   Post Size Width 0.3 09/07/22 0900   Post Size Depth 0.1 09/07/22 0900   Post Total Sq cm 0.72 09/07/22 0900   Product Used ABD Pad 08/30/22 1000       Incision/Surgical Site 11/04/20 Left Lower Eyelid (Active)       Incision/Surgical Site 07/09/22 Abdomen (Active)       Incision/Surgical Site 07/09/22 Bilateral Leg (Active)       Undermining is not present.    The periwoundskin is pink. Left lateral ankle slightly macerated      Plan:         1. Patient will return in 3 days for nurse visit.            2. As listed below, treatment provided irrigation, mechanical cleansing, and dressings to promote autolytic debridement and included application of multi-layer compression therapy.             Cleansed with: Dilute hibiclens 30cc in 500cc  NS    Protected skin with: Remedy skin repair lotion    Dressings Applied to wound: Hydrofera blue ready followed by ABD pad and roll gauze    Compression Applied to the right le-Layer Compression  Compression Applied to the left le-Layer Compression    2-Layer Coban: I Applied the inner foam layer with the foot dorsiflexed and started atthe base of the fifth metatarsal head. I left the bottom of the heel exposed, and proceed by winding the foam up the leg using minimal overlap to just below the fibular head. I then applied the compression layer with the foot dorsiflexed and startingat the base of the fifth metatarsal head. I applied at full stretch and proceeded up the leg using 50% overlap. The bottom of the heel is covered with the compression layer up to the end at the fibular head just below the back of the knee and levelwith the top edge of the foam layer.  I gently pressed and conformed the entire surface of the system to ensurethat the two layers are firmly bound together      Offloading used: None    Trial Products: No    Provider notified regarding concerns: No    Treatment Changes: No    Tolerated Dressing Change:  Yes    Taught Regarding: follow up appointment(s), wound cares, compression, layered compression wraps - maximum wear time of 7 days, elevation, offloading and compliance    Educational Barriers: Cognitive (memory impairment)      Bárbara Saxena, RN, CWCN

## 2022-09-22 ENCOUNTER — ALLIED HEALTH/NURSE VISIT (OUTPATIENT)
Dept: VASCULAR SURGERY | Facility: CLINIC | Age: 82
End: 2022-09-22
Attending: NURSE PRACTITIONER
Payer: MEDICARE

## 2022-09-22 VITALS
DIASTOLIC BLOOD PRESSURE: 84 MMHG | RESPIRATION RATE: 16 BRPM | HEART RATE: 76 BPM | SYSTOLIC BLOOD PRESSURE: 140 MMHG | TEMPERATURE: 97.8 F

## 2022-09-22 DIAGNOSIS — R60.0 VENOUS STASIS ULCER OF LEFT LOWER LEG WITH EDEMA OF LEFT LOWER LEG (H): ICD-10-CM

## 2022-09-22 DIAGNOSIS — I83.892 VENOUS STASIS ULCER OF LEFT LOWER LEG WITH EDEMA OF LEFT LOWER LEG (H): ICD-10-CM

## 2022-09-22 DIAGNOSIS — I83.029 VENOUS STASIS ULCER OF LEFT LOWER LEG WITH EDEMA OF LEFT LOWER LEG (H): ICD-10-CM

## 2022-09-22 DIAGNOSIS — I83.019 VENOUS STASIS ULCER OF RIGHT LOWER LEG WITH EDEMA OF RIGHT LOWER LEG (H): Primary | ICD-10-CM

## 2022-09-22 DIAGNOSIS — I83.891 VENOUS STASIS ULCER OF RIGHT LOWER LEG WITH EDEMA OF RIGHT LOWER LEG (H): Primary | ICD-10-CM

## 2022-09-22 DIAGNOSIS — L97.919 VENOUS STASIS ULCER OF RIGHT LOWER LEG WITH EDEMA OF RIGHT LOWER LEG (H): Primary | ICD-10-CM

## 2022-09-22 DIAGNOSIS — I87.2 VENOUS (PERIPHERAL) INSUFFICIENCY: ICD-10-CM

## 2022-09-22 DIAGNOSIS — L97.929 VENOUS STASIS ULCER OF LEFT LOWER LEG WITH EDEMA OF LEFT LOWER LEG (H): ICD-10-CM

## 2022-09-22 DIAGNOSIS — R60.0 VENOUS STASIS ULCER OF RIGHT LOWER LEG WITH EDEMA OF RIGHT LOWER LEG (H): Primary | ICD-10-CM

## 2022-09-22 DIAGNOSIS — R60.0 EDEMA OF LOWER EXTREMITY: ICD-10-CM

## 2022-09-22 PROCEDURE — 97602 WOUND(S) CARE NON-SELECTIVE: CPT

## 2022-09-22 ASSESSMENT — PAIN SCALES - GENERAL: PAINLEVEL: NO PAIN (0)

## 2022-09-22 NOTE — PATIENT INSTRUCTIONS
- Please call us if your compression wraps fall more than 1-2 inches below the bend of the knee. Remove the wraps if you experience any shortness of breathe or notice your toes turning blue/purple and then give us a call. Call if they are too painful. Call if they get wet. If it is a weekend and the wraps fall down, are too painful, or get wet take the wraps off and put on another form of compression. Compression such as velcro wraps, compression stockings, short stretch bandages, or tubular compression. Apply one of these until you can be seen in clinic. Please call us if you have any questions [phone].    - Treatment:  Layered Compression Bandaging (2-layer)    What is it?  The layered compression bandaging has a layer of absorbent material that will soak up drainage.     Why we do it.   This is done to treat swelling, wounds, or both.  This will in turn help circulation and healing.    How to care for your bandages.  The wraps need to be kept dry. If  the wraps become wet, remove them and call the clinic to have another wrap applied.    What to expect.  It is common for the wraps to be uncomfortable at the beginning. The first two days are usually the hardest; then they will become more comfortable.       Elevating your legs will help the discomfort. Try to elevate your legs as much as possible.    If rest and elevation does not help your discomfort, call your provider.  If your provider is not available you can remove the wrap and leave a message for further instructions.    - Swelling and Compression Therapy    Swelling in the legs can be caused by many reasons. No matter what the reason, treatment usually includes some type of compression. This may be done with a support sock, dressing, ace wrap, or layered wraps.     It is important to treat the swelling for many reasons. If the swelling is not treated you may develop blisters that can lead to ulcerations. This is caused when extra fluid goes into tissue  causing damage and blocking blood flow to the tissue.     It is important that you wear your compression every day, including days that you will be seen in clinic.     Compression is often the most important part of treating leg wounds. Without controlling the swelling it is often not possible to heal wounds.     Going without compression for even brief periods of time can be damaging to your legs and your health.  Your compression should be put on first thing in the morning. Take the compression off at night only when instructed by your care provider to do so. Sometimes wearing compression 24 hours a day will be recommended.       If you are having difficulty wearing your compression it is important to notify your care provider so that other options may be reviewed.    Thank you for choosing [facility]. Please call us if you have any questions [phone].

## 2022-09-22 NOTE — PROGRESS NOTES
[facility]  -  Nurse Visit        Date of Service:  September 22, 2022     Requesting Provider: TOI Green    Diagnosis:     ICD-10-CM    1. Venous stasis ulcer of right lower leg with edema of right lower leg (H)  I83.019     I83.891     L97.919     R60.9    2. Venous stasis ulcer of left lower leg with edema of left lower leg (H)  I83.029     I83.892     L97.929     R60.9    3. Edema of lower extremity  R60.0    4. Venous (peripheral) insufficiency  I87.2        Chief Complaint: Ramu is being seen today at [facility] [place] for his bilateral leg wounds and swelling. They arrive to the clinic today alone. Reports pain of 0.  Denies any fevers, chills, or generalized ill feeling.     Dressing on Arrival: 2-layer bilaterally C/D/I, Pt is currently using 2-layer for compression and did tolerate well. At last visit had complained that the top off the wrap was very uncomfortable and kept rolling down. He reported wraps to be more comfortable this week.  Upon removal of dressings moderate Serosanguinous drainage is noted to left lower leg wound. Yellow in color. Only scant drainage to left shin wound and right leg wound in non-measurable and no drainage noted.         Right leg 9/22        Right leg 9/22        Right shin 9/22        Left lateral leg 9/22        Bilateral legs 9/22    New Wounds noted: No    Vital Signs: BP (!) 140/84 (BP Location: Left arm, Patient Position: Sitting, Cuff Size: Adult Regular)   Pulse 76   Temp 97.8  F (36.6  C) (Temporal)   Resp 16     Assessment:      General:  Patient presents to clinic in no apparent distress.   Psychiatric:  Alert and oriented x3.   Lower extremity:  edema is present.    Integumentary:  Skin is WNL    Circumferential volume measures:  Circumferential Measures 8/12/2022 8/23/2022 8/30/2022 9/7/2022 9/22/2022   Right just above MTP 24.5 24 23 24.8 23.2   Right Ankle 25.6 23.4 25 25.1 23.6   Right Widest Calf 32.8  32.5 31 30.9 31   Left - just above MTP 25 24 23 25.2 23.6   Left Ankle 28.5 23.3 26 25.5 25.4   Left Widest Calf 34.2 32.3 32 30.2 30.6       Wound info:  Wound (used by OP [facility] only) 03/03/21 1237 Right (Active)       VASC Wound Lt lateral ankle (Active)   Pre Size Length 0 09/15/22 0900   Pre Size Width 0 09/15/22 0900   Pre Size Depth 0 09/15/22 0900   Pre Total Sq cm 0 09/15/22 0900   Product Used Alginate 08/30/22 1000       VASC Wound Lt lateral leg (Active)   Pre Size Length 6.2 09/22/22 0900   Pre Size Width 2.4 09/22/22 0900   Pre Size Depth 0.1 09/22/22 0900   Pre Total Sq cm 14.88 09/22/22 0900   Post Size Length 7.5 09/07/22 0900   Post Size Width 3 09/07/22 0900   Post Size Depth 0.1 09/07/22 0900   Post Total Sq cm 22.5 09/07/22 0900   Product Used Alginate 08/30/22 1000       VASC Wound Lt lower shin (Active)   Pre Size Length 1.6 09/22/22 0900   Pre Size Width 0.8 09/22/22 0900   Pre Size Depth 0.1 09/22/22 0900   Pre Total Sq cm 1.28 09/22/22 0900   Post Size Length 2.5 09/07/22 0900   Post Size Width 1.5 09/07/22 0900   Post Size Depth 0.1 09/07/22 0900   Post Total Sq cm 3.75 09/07/22 0900   Product Used ABD Pad 08/30/22 1000       VASC Wound Rt lateral leg (Active)   Pre Size Length 1 09/19/22 1100   Pre Size Width 0.2 09/19/22 1100   Pre Size Depth 0.1 09/19/22 1100   Pre Total Sq cm 0.2 09/19/22 1100   Post Size Length 2.4 09/07/22 0900   Post Size Width 0.3 09/07/22 0900   Post Size Depth 0.1 09/07/22 0900   Post Total Sq cm 0.72 09/07/22 0900   Product Used ABD Pad 08/30/22 1000       Incision/Surgical Site 11/04/20 Left Lower Eyelid (Active)       Incision/Surgical Site 07/09/22 Abdomen (Active)       Incision/Surgical Site 07/09/22 Bilateral Leg (Active)       Undermining is not present.    The periwoundskin is WNL      Plan:         1. Patient will return in 5 days for routine follow up with Deborah Green CNP .            2. As listed below, treatment provided irrigation, mechanical  cleansing, and dressings to promote autolytic debridement and included application of multi-layer compression therapy.             Cleansed with: Dilute hibiclens 30cc in 500cc NS    Protected skin with: Remedy skin repair lotion    Dressings Applied to wound: Hydrofera Blue Ready    Compression Applied to the right le-Layer Compression  Compression Applied to the left le-Layer Compression    2-Layer [place]: I Applied the inner foam layer with the foot dorsiflexed and started atthe base of the fifth metatarsal head. I left the bottom of the heel exposed, and proceed by winding the foam up the leg using minimal overlap to just below the fibular head. I then applied the compression layer with the foot dorsiflexed and startingat the base of the fifth metatarsal head. I applied at full stretch and proceeded up the leg using 50% overlap. The bottom of the heel is covered with the compression layer up to the end at the fibular head just below the back of the knee and levelwith the top edge of the foam layer.  I gently pressed and conformed the entire surface of the system to ensurethat the two layers are firmly bound together      Offloading used: None    Trial Products: No    Provider notified regarding concerns: No    Treatment Changes: No    Tolerated Dressing Change:  Yes    Taught Regarding: follow up appointment(s), wound cares, wound care supplies, compression, layered compression wraps - maximum wear time of 7 days, offloading and compliance    Educational Barriers: cognitive-memory impairment per chart      Bárbara Saxena RN, CWCN

## 2022-09-27 ENCOUNTER — OFFICE VISIT (OUTPATIENT)
Dept: VASCULAR SURGERY | Facility: CLINIC | Age: 82
End: 2022-09-27
Attending: NURSE PRACTITIONER
Payer: MEDICARE

## 2022-09-27 VITALS
BODY MASS INDEX: 22.9 KG/M2 | SYSTOLIC BLOOD PRESSURE: 154 MMHG | OXYGEN SATURATION: 92 % | DIASTOLIC BLOOD PRESSURE: 82 MMHG | HEART RATE: 77 BPM | WEIGHT: 160 LBS | TEMPERATURE: 98.1 F | HEIGHT: 70 IN

## 2022-09-27 DIAGNOSIS — R60.0 VENOUS STASIS ULCER OF LEFT LOWER LEG WITH EDEMA OF LEFT LOWER LEG (H): ICD-10-CM

## 2022-09-27 DIAGNOSIS — R60.0 EDEMA OF LOWER EXTREMITY: ICD-10-CM

## 2022-09-27 DIAGNOSIS — I87.2 VENOUS (PERIPHERAL) INSUFFICIENCY: ICD-10-CM

## 2022-09-27 DIAGNOSIS — L97.929 VENOUS STASIS ULCER OF LEFT LOWER LEG WITH EDEMA OF LEFT LOWER LEG (H): ICD-10-CM

## 2022-09-27 DIAGNOSIS — B35.1 ONYCHOMYCOSIS: ICD-10-CM

## 2022-09-27 DIAGNOSIS — D47.3 ESSENTIAL THROMBOCYTHEMIA (H): ICD-10-CM

## 2022-09-27 DIAGNOSIS — L97.919 VENOUS STASIS ULCER OF RIGHT LOWER LEG WITH EDEMA OF RIGHT LOWER LEG (H): Primary | ICD-10-CM

## 2022-09-27 DIAGNOSIS — I83.019 VENOUS STASIS ULCER OF RIGHT LOWER LEG WITH EDEMA OF RIGHT LOWER LEG (H): Primary | ICD-10-CM

## 2022-09-27 DIAGNOSIS — I83.029 VENOUS STASIS ULCER OF LEFT LOWER LEG WITH EDEMA OF LEFT LOWER LEG (H): ICD-10-CM

## 2022-09-27 DIAGNOSIS — I83.891 VENOUS STASIS ULCER OF RIGHT LOWER LEG WITH EDEMA OF RIGHT LOWER LEG (H): Primary | ICD-10-CM

## 2022-09-27 DIAGNOSIS — R60.0 VENOUS STASIS ULCER OF RIGHT LOWER LEG WITH EDEMA OF RIGHT LOWER LEG (H): Primary | ICD-10-CM

## 2022-09-27 DIAGNOSIS — I83.892 VENOUS STASIS ULCER OF LEFT LOWER LEG WITH EDEMA OF LEFT LOWER LEG (H): ICD-10-CM

## 2022-09-27 PROCEDURE — 11721 DEBRIDE NAIL 6 OR MORE: CPT | Mod: XS | Performed by: NURSE PRACTITIONER

## 2022-09-27 PROCEDURE — 11042 DBRDMT SUBQ TIS 1ST 20SQCM/<: CPT | Performed by: NURSE PRACTITIONER

## 2022-09-27 ASSESSMENT — PAIN SCALES - GENERAL: PAINLEVEL: NO PAIN (0)

## 2022-09-27 NOTE — PROGRESS NOTES
"            Follow up Vascular Visit       Date of Service:09/27/22      Chief Complaint: BLE swelling and ulcerations      Pt returns to [facility] with regards to their BLE swelling and ulcerations.  They arrive today alone. They are currently using hydrofera blue ready to the wounds. This is being done by staff at our clinic twice per week. They are using 2 layer bilaterally  for compression. They are feeling well today. Denies fevers, chills. No shortness of breath. Last month we sent message to pcp and neurology team about weight loss and concerns for cognitive decline, taking medications, and weight loss. The pcp had a community paramedic see the patient; I  Reviewed their assessment; pt was not taking his medications; house was clean; no hazards found. Pt has appt with pcp upcoming. He is coming to his vascular appt. He is vague today if he is taking his medications or not \"I have an appointment coming up to talk about that\" he is noted to have elevated bp today. He becomes very defensive when I talk to him about his medications and if he is eating. His weight is noted to be up today.         Allergies:   Allergies   Allergen Reactions     Cats Itching     Dust Mites Itching     Mold Itching     Levaquin [Levofloxacin]      Ankle swelling       Medications:   Current Outpatient Medications:      ADVAIR -21 MCG/ACT inhaler, INHALE 2 PUFFS BY MOUTH TWICE DAILY, Disp: , Rfl:      albuterol (PROAIR HFA/PROVENTIL HFA/VENTOLIN HFA) 108 (90 Base) MCG/ACT inhaler, Inhale 2 puffs into the lungs as needed for shortness of breath / dyspnea or wheezing, Disp: , Rfl:      ascorbic acid 1000 MG TABS tablet, Take 1,000 mg by mouth, Disp: , Rfl:      aspirin (ASA) 81 MG EC tablet, Take 81 mg by mouth daily, Disp: , Rfl:      BREZTRI AEROSPHERE 160-9-4.8 MCG/ACT AERO, INHALE 2 PUFFS BY MOUTH TWICE DAILY, Disp: , Rfl:      calcium carbonate-vitamin D (OS-JOSUE WITH D) 500-200 MG-UNIT tablet, Take 1 tablet " "by mouth, Disp: , Rfl:      cholecalciferol (DIALYVITE VITAMIN D 5000) 125 MCG (5000 UT) CAPS, Take 2 capsules (10,000 Units) by mouth daily, Disp: 90 capsule, Rfl: 3     donepezil (ARICEPT) 5 MG tablet, TAKE 1 TABLET(5 MG) BY MOUTH EVERY DAY, Disp: , Rfl:      ferrous sulfate (FEROSUL) 325 (65 Fe) MG tablet, Take 325 mg by mouth daily (with breakfast), Disp: , Rfl:      ketoconazole (NIZORAL A-D) 1 % shampoo, Externally apply topically as needed, Disp: , Rfl:      levothyroxine (SYNTHROID/LEVOTHROID) 100 MCG tablet, Take 100 mcg by mouth daily, Disp: , Rfl:      losartan (COZAAR) 100 MG tablet, Take 100 mg by mouth daily, Disp: , Rfl:      losartan (COZAAR) 25 MG tablet, , Disp: , Rfl:      multivitamin w/minerals (THERA-VIT-M) tablet, Take 1 tablet by mouth, Disp: , Rfl:      vitamin C (ASCORBIC ACID) 1000 MG TABS, Take 1 tablet (1,000 mg) by mouth 2 times daily, Disp: 180 tablet, Rfl: 3    Current Facility-Administered Medications:      lidocaine (XYLOCAINE) 2 % external gel, , Topical, Daily PRN, Deborah Green NP    History:   Past Medical History:   Diagnosis Date     Asthma      COPD (chronic obstructive pulmonary disease) (H)      Essential thrombocytopenia (H)      Graves disease      Hypertension      Hypothyroidism      Lumbar disc herniation      Testosterone deficiency        Physical Exam:    BP (!) 154/82   Pulse 77   Temp 98.1  F (36.7  C)   Ht 5' 10\" (1.778 m)   Wt 160 lb (72.6 kg)   SpO2 92%   BMI 22.96 kg/m      General:  Patient presents to clinic in no apparent distress.  Head: normocephalic atraumatic  Psychiatric:  Alert and oriented x3.   Respiratory: unlabored breathing; no cough  Integumentary:  Skin is uniformly warm, dry and pink.    Wound #1 Location: left lateral ankle  Size: 6.3L x 1.7W x 0.1depth.  No sinus tract present, Wound base: slough  No undermining present. Wound is full  thickness. There is moderate drainage. Periwound: no denudement, erythema, induration, maceration or " warmth.      Wound #2 Location: left shin  Size: 1L x 0.8W x 0.1depth.  No sinus tract present, Wound base: slough  No undermining present. Wound is full  thickness. There is moderate drainage. Periwound: no denudement, erythema, induration, maceration or warmth.      Wound #3 Location: right lateral leg Size: 0L x 0W x 0depth.  No sinus tract present, Wound base: healed; scar tissue      Nails 1-5 bilaterally were thickened; elongated, and discolored      Wound (used by [facility] only) 03/03/21 1237 Right (Active)   Number of days: 573       VASC Wound Lt lateral ankle (Active)   Pre Size Length 0 09/15/22 0900   Pre Size Width 0 09/15/22 0900   Pre Size Depth 0 09/15/22 0900   Pre Total Sq cm 0 09/15/22 0900   Product Used Alginate 08/30/22 1000   Number of days: 57       VASC Wound Lt lateral leg (Active)   Pre Size Length 6.3 09/27/22 0900   Pre Size Width 1.7 09/27/22 0900   Pre Size Depth 0.1 09/27/22 0900   Pre Total Sq cm 10.71 09/27/22 0900   Post Size Length 7.5 09/07/22 0900   Post Size Width 3 09/07/22 0900   Post Size Depth 0.1 09/07/22 0900   Post Total Sq cm 22.5 09/07/22 0900   Product Used Alginate 08/30/22 1000   Number of days: 57       VASC Wound Lt lower shin (Active)   Pre Size Length 1 09/27/22 0900   Pre Size Width 0.8 09/27/22 0900   Pre Size Depth 0.1 09/27/22 0900   Pre Total Sq cm 0.8 09/27/22 0900   Post Size Length 2.5 09/07/22 0900   Post Size Width 1.5 09/07/22 0900   Post Size Depth 0.1 09/07/22 0900   Post Total Sq cm 3.75 09/07/22 0900   Product Used ABD Pad 08/30/22 1000   Number of days: 57       VASC Wound Rt lateral leg (Active)   Pre Size Length 1 09/19/22 1100   Pre Size Width 0.2 09/19/22 1100   Pre Size Depth 0.1 09/19/22 1100   Pre Total Sq cm 0.2 09/19/22 1100   Post Size Length 2.4 09/07/22 0900   Post Size Width 0.3 09/07/22 0900   Post Size Depth 0.1 09/07/22 0900   Post Total Sq cm 0.72 09/07/22 0900   Description scab 09/27/22 0900   Product Used ABD Pad 08/30/22 1000    Number of days: 57       Incision/Surgical Site 11/04/20 Left Lower Eyelid (Active)   Number of days: 692       Incision/Surgical Site 07/09/22 Abdomen (Active)   Number of days: 80       Incision/Surgical Site 07/09/22 Bilateral Leg (Active)   Number of days: 80            Circumferential volume measures:      Circumferential Measures 8/12/2022 8/23/2022 8/30/2022 9/7/2022 9/22/2022   Right just above MTP 24.5 24 23 24.8 23.2   Right Ankle 25.6 23.4 25 25.1 23.6   Right Widest Calf 32.8 32.5 31 30.9 31   Left - just above MTP 25 24 23 25.2 23.6   Left Ankle 28.5 23.3 26 25.5 25.4   Left Widest Calf 34.2 32.3 32 30.2 30.6       Labs:    I personally reviewed the following lab results today and those on care everywhere    CRP   Date Value Ref Range Status   07/08/2022 5.5 (H) 0.0 - <0.8 mg/dL Final      No results found for: SED   Last Renal Panel:  Sodium   Date Value Ref Range Status   07/10/2022 143 136 - 145 mmol/L Final     Potassium   Date Value Ref Range Status   07/10/2022 4.3 3.5 - 5.0 mmol/L Final     Chloride   Date Value Ref Range Status   07/10/2022 110 (H) 98 - 107 mmol/L Final     Carbon Dioxide (CO2)   Date Value Ref Range Status   07/10/2022 24 22 - 31 mmol/L Final     Anion Gap   Date Value Ref Range Status   07/10/2022 9 5 - 18 mmol/L Final     Glucose   Date Value Ref Range Status   07/10/2022 93 70 - 125 mg/dL Final     GLUCOSE BY METER POCT   Date Value Ref Range Status   07/10/2022 79 70 - 99 mg/dL Final     Urea Nitrogen   Date Value Ref Range Status   07/10/2022 45 (H) 8 - 28 mg/dL Final     Creatinine   Date Value Ref Range Status   07/10/2022 1.81 (H) 0.70 - 1.30 mg/dL Final     GFR Estimate   Date Value Ref Range Status   07/10/2022 37 (L) >60 mL/min/1.73m2 Final     Comment:     Effective December 21, 2021 eGFRcr in adults is calculated using the 2021 CKD-EPI creatinine equation which includes age and gender (Michell et al., NEJM, DOI: 10.1056/VNLNrh1528997)     Calcium   Date Value Ref  Range Status   07/10/2022 7.9 (L) 8.5 - 10.5 mg/dL Final     Phosphorus   Date Value Ref Range Status   07/08/2022 5.6 (H) 2.5 - 4.5 mg/dL Final     Albumin   Date Value Ref Range Status   07/09/2022 3.0 (L) 3.5 - 5.0 g/dL Final      Lab Results   Component Value Date    WBC 10.9 07/10/2022     Lab Results   Component Value Date    RBC 3.25 07/10/2022     Lab Results   Component Value Date    HGB 10.2 07/10/2022     Lab Results   Component Value Date    HCT 33.6 07/10/2022     No components found for: MCT  Lab Results   Component Value Date     07/10/2022     Lab Results   Component Value Date    MCH 31.4 07/10/2022     Lab Results   Component Value Date    MCHC 30.4 07/10/2022     Lab Results   Component Value Date    RDW 13.6 07/10/2022     Lab Results   Component Value Date     07/10/2022      No results found for: A1C   TSH   Date Value Ref Range Status   07/08/2022 0.49 0.30 - 5.00 uIU/mL Final      No results found for: VITDT                Impression:  Encounter Diagnoses   Name Primary?     Venous stasis ulcer of right lower leg with edema of right lower leg (H) Yes     Venous stasis ulcer of left lower leg with edema of left lower leg (H)      Edema of lower extremity      Venous (peripheral) insufficiency      Essential thrombocythemia (H)           9/27/2022 right leg   8/8/22 right lateral leg              9/27/2022 left shin          9/27/2022 left leg      8/8/22 left leg                       Are any of these wounds new today: No; Location: na    Assessment/Plan:          1. Debridement: After discussion of risk factors and verbal consent was obtained 2% Lidocaine HCL jelly was applied, under clean conditions, the LLE ulceration(s) were debrided using currette. Devitalized and nonviable tissue, along with any fibrin and slough, was removed to improve granulation tissue formation, stimulate wound healing, decrease overall bacteria load, disrupt biofilm formation and decrease edge  senescence.  Total excisional debridement was 11.51 sq cm from the epidermis/dermis area and into the subcutaneous tissue with a depth of 0.1 cm.   Ulcers were improved afterwards and .  Measures were unchanged after debridement.       2.  Wound treatment: wound treatment will include irrigation and dressings to promote autolytic debridement which will include:will continue hydrofera blue ready; gauze; rolled gauze. If for some reason the patient is not able to get their dressing(s) changed as outlined above (due to illness, lack of supplies, lack of help) please do the following: remove old, soiled dressings; wash the wounds with saline; pat dry; apply ABD pad or other absorbant pad and secure with rolled gauze; avoid tape directly on your skin; patient instructed to call the clinic as soon as possible to let us know what the current issues are in receiving wound care. Stable            3. Edema: transitioned the right leg into velcro wraps; we will leave this on for the week as I don't believe the patient can handle at this time donning and doffing. Will continue 2 layer on the left leg. The compression wraps were applied today in clinic.     If a 2 layer or 4 layer compression wrap is being used; these are safe to have on for ONLY 7 days. If for some reason the patient is not able to get the wrap(s) changed (due to illness; lack of supplies, lack of help, lack of transportation) please do the following: unwrap the old 2 or 4 layer compression wrap; avoid using scissors as you could cut your skin and cause wounds; use tubular compression when available. Call to reschedule your home care or clinic visit appointment as soon as possible.  Stable            4. Nutrition: weight is up today; pt states he is eating           5. Offloading: na          6. Htn: bp is elevated today; has upcoming appt with pcp; unclear if pt is taking his medications.           7. Nails: nails 1-5 bilaterally were debrided and filed  smooth; tolerated well, no complications.        Patient will follow up with me in 1 weeks for reevaluation. They were instructed to call the clinic sooner with any signs or symptoms of infection or any further questions/concerns. Answered all questions.          Deborah Green DNP, RN, CNP, CWOCN, CFCN, CLT  [facility]   [phone]        This note was electronically signed by Deborah Green NP

## 2022-09-27 NOTE — PATIENT INSTRUCTIONS
"Wound care supplies were ordered today through [email or web address] and if you are not receiving your supplies or have a question on your bill please contact Johanne Ascencio at [phone]. Please allow 2-5 business days for delivery of supplies.     Elevate the legs for 30 minutes 3-4 times per day      Keep pressure off the wound area    Take daily Multi vitamin     Take 1 premier protein shake per day      Wound Care Instructions    1 TIMES PER WEEK and as needed, Cleanse your bilateral legs and leg wound(s) with Dilute hibiclens 30cc in 500cc NS.    Pat Dry with non-sterile gauze    Apply Lotion to the intact skin surrounding your wound and other dry skin locations. Some good lotions include: Remedy Skin Repair Cream, Sarna, Vanicream or Cetaphil    Primary Dressing: Apply hydrofera blue ready into/onto the wounds    Secondary dressing: Cover with ABD or gauze    Secure with non-sterile roll gauze (4\" x 75\" roll) and tape (1\" roll tape) as needed; avoid adhesive directly on the skin    Compression: 2 layer to the left and velcro to the right (leave the velcro on all week)    It is not ok to get your wound wet in the bath or shower      If for some reason you are not able to get your dressing(s) changed as outlined above (due to illness, lack of supplies, lack of help) please do the following: remove old, soiled dressings; wash the wounds with saline; pat dry; apply ABD pad or other absorbant pad and secure with rolled gauze; avoid tape directly on your skin; Call the clinic as soon as possible to let us know what the current issues are in receiving wound care [phone].      SEEK MEDICAL CARE IF:  You have an increase in swelling, pain, or redness around the wound.  You have an increase in the amount of pus coming from the wound.  There is a bad smell coming from the wound.  The wound appears to be worsening/enlarging  You have a fever greater than 101.5 F      It is ok to continue current wound care treatment/products " for the next 2-3 days until new wound care supplies are ordered and arrive. If longer than this please contact our office at [phone].    If you have a 2 layer or 4 layer compression wrap on these are safe to have on for ONLY 7 days. If for some reason you are not able to get the wrap(s) changed (due to illness; lack of supplies, lack of help, lack of transportation) please do the following: unwrap the old 2 or 4 layer compression wrap; avoid using scissors as you could cut your skin and cause wounds; use tubular compression when available. Call to reschedule your home care or clinic visit appointment as soon as possible.    Please NOTE: if you are 15 minutes late to your clinic appointment you will have to be rescheduled. Please call our clinic as soon as possible if you know you will not be able to get to your appointment at [phone].    If you fail to show up to 3 scheduled clinic appointments you will be dismissed from our clinic.        It is recommended that you do not get your ulcer wet when showering.  Listed below are several ways of keeping it dry when you shower.     1. Wrap it with Press and Seal plastic wrap.  It can be found in the stores where the plastic wraps or tin foil is kept.               2.  Some people take a bath and hang their leg/foot out of the tub.                        3  Put your leg in a plastic bag and tape it on.           4. You can purchase a shower cover for casts at some pharmacies and through the Internet.            5. Take a Bed Bath or wash up at the sink            We want to hear from you!  In the next few weeks, you should receive a call or email to complete a survey about your visit at [facility]. Please help us improve your appointment experience by letting us know how we did today. We strive to make your experience good and value any ways in which we could do better.      We value your input and suggestions.    Thank you for choosing the   [facility]!

## 2022-09-27 NOTE — NURSING NOTE
"Compression Applied to Left  2-Layer [place]: I Applied the inner foam layer with the foot dorsiflexed and started atthe base of the fifth metatarsal head. I left the bottom of the heel exposed, and proceed by winding the foam up the leg using minimal overlap to just below the fibular head. I then applied the compression layer with the foot dorsiflexed and startingat the base of the fifth metatarsal head. I applied at full stretch and proceeded up the leg using 50% overlap. The bottom of the heel is covered with the compression layer up to the end at the fibular head just below the back of the knee and levelwith the top edge of the foam layer.  I gently pressed and conformed the entire surface of the system to ensurethat the two layers are firmly bound together     Compression Applied to Right  Velcro\", Compression Stockings      "

## 2022-10-04 ENCOUNTER — TELEPHONE (OUTPATIENT)
Dept: VASCULAR SURGERY | Facility: CLINIC | Age: 82
End: 2022-10-04

## 2022-10-04 ENCOUNTER — OFFICE VISIT (OUTPATIENT)
Dept: VASCULAR SURGERY | Facility: CLINIC | Age: 82
End: 2022-10-04
Attending: NURSE PRACTITIONER
Payer: MEDICARE

## 2022-10-04 VITALS
WEIGHT: 159.9 LBS | TEMPERATURE: 97.6 F | OXYGEN SATURATION: 97 % | SYSTOLIC BLOOD PRESSURE: 162 MMHG | RESPIRATION RATE: 12 BRPM | DIASTOLIC BLOOD PRESSURE: 84 MMHG | BODY MASS INDEX: 22.94 KG/M2 | HEART RATE: 76 BPM

## 2022-10-04 DIAGNOSIS — I83.892 VENOUS STASIS ULCER OF LEFT LOWER LEG WITH EDEMA OF LEFT LOWER LEG (H): Primary | ICD-10-CM

## 2022-10-04 DIAGNOSIS — L97.919 VENOUS STASIS ULCER OF RIGHT LOWER LEG WITH EDEMA OF RIGHT LOWER LEG (H): ICD-10-CM

## 2022-10-04 DIAGNOSIS — I83.029 VENOUS STASIS ULCER OF LEFT LOWER LEG WITH EDEMA OF LEFT LOWER LEG (H): Primary | ICD-10-CM

## 2022-10-04 DIAGNOSIS — I83.891 VENOUS STASIS ULCER OF RIGHT LOWER LEG WITH EDEMA OF RIGHT LOWER LEG (H): ICD-10-CM

## 2022-10-04 DIAGNOSIS — I83.019 VENOUS STASIS ULCER OF RIGHT LOWER LEG WITH EDEMA OF RIGHT LOWER LEG (H): ICD-10-CM

## 2022-10-04 DIAGNOSIS — L97.929 VENOUS STASIS ULCER OF LEFT LOWER LEG WITH EDEMA OF LEFT LOWER LEG (H): Primary | ICD-10-CM

## 2022-10-04 DIAGNOSIS — R60.0 VENOUS STASIS ULCER OF RIGHT LOWER LEG WITH EDEMA OF RIGHT LOWER LEG (H): ICD-10-CM

## 2022-10-04 DIAGNOSIS — R60.0 EDEMA OF LOWER EXTREMITY: ICD-10-CM

## 2022-10-04 DIAGNOSIS — R60.0 VENOUS STASIS ULCER OF LEFT LOWER LEG WITH EDEMA OF LEFT LOWER LEG (H): Primary | ICD-10-CM

## 2022-10-04 DIAGNOSIS — R41.3 MEMORY IMPAIRMENT: ICD-10-CM

## 2022-10-04 DIAGNOSIS — I87.2 VENOUS (PERIPHERAL) INSUFFICIENCY: ICD-10-CM

## 2022-10-04 PROCEDURE — 11042 DBRDMT SUBQ TIS 1ST 20SQCM/<: CPT | Performed by: NURSE PRACTITIONER

## 2022-10-04 ASSESSMENT — PAIN SCALES - GENERAL: PAINLEVEL: NO PAIN (0)

## 2022-10-04 NOTE — PATIENT INSTRUCTIONS
"Wound care supplies were ordered today through [employer] and if you are not receiving your supplies or have a question on your bill please contact Johanne Ascencio at [phone]. Please allow 2-5 business days for delivery of supplies.     Elevate the legs for 30 minutes 3-4 times per day      Keep pressure off the wound area    Take daily Multi vitamin     Take 1 premier protein shake per day      Wound Care Instructions    1 TIMES PER WEEK and as needed, Cleanse your bilateral legs and leg wound(s) with Dilute hibiclens 30cc in 500cc NS.    Pat Dry with non-sterile gauze    Apply Lotion to the intact skin surrounding your wound and other dry skin locations. Some good lotions include: Remedy Skin Repair Cream, Sarna, Vanicream or Cetaphil    Primary Dressing: Apply hydrofera blue ready into/onto the wounds    Secondary dressing: Cover with ABD or gauze    Secure with non-sterile roll gauze (4\" x 75\" roll) and tape (1\" roll tape) as needed; avoid adhesive directly on the skin    Compression: 2 layer to the left and velcro to the right (leave the velcro on all week)    It is not ok to get your wound wet in the bath or shower      If for some reason you are not able to get your dressing(s) changed as outlined above (due to illness, lack of supplies, lack of help) please do the following: remove old, soiled dressings; wash the wounds with saline; pat dry; apply ABD pad or other absorbant pad and secure with rolled gauze; avoid tape directly on your skin; Call the clinic as soon as possible to let us know what the current issues are in receiving wound care [phone].      SEEK MEDICAL CARE IF:  You have an increase in swelling, pain, or redness around the wound.  You have an increase in the amount of pus coming from the wound.  There is a bad smell coming from the wound.  The wound appears to be worsening/enlarging  You have a fever greater than 101.5 F      It is ok to continue current wound care treatment/products " for the next 2-3 days until new wound care supplies are ordered and arrive. If longer than this please contact our office at [phone].    If you have a 2 layer or 4 layer compression wrap on these are safe to have on for ONLY 7 days. If for some reason you are not able to get the wrap(s) changed (due to illness; lack of supplies, lack of help, lack of transportation) please do the following: unwrap the old 2 or 4 layer compression wrap; avoid using scissors as you could cut your skin and cause wounds; use tubular compression when available. Call to reschedule your home care or clinic visit appointment as soon as possible.    Please NOTE: if you are 15 minutes late to your clinic appointment you will have to be rescheduled. Please call our clinic as soon as possible if you know you will not be able to get to your appointment at [phone].    If you fail to show up to 3 scheduled clinic appointments you will be dismissed from our clinic.        It is recommended that you do not get your ulcer wet when showering.  Listed below are several ways of keeping it dry when you shower.     1. Wrap it with Press and Seal plastic wrap.  It can be found in the stores where the plastic wraps or tin foil is kept.               2.  Some people take a bath and hang their leg/foot out of the tub.                        3  Put your leg in a plastic bag and tape it on.           4. You can purchase a shower cover for casts at some pharmacies and through the Internet.            5. Take a Bed Bath or wash up at the sink            We want to hear from you!  In the next few weeks, you should receive a call or email to complete a survey about your visit at [facility]. Please help us improve your appointment experience by letting us know how we did today. We strive to make your experience good and value any ways in which we could do better.      We value your input and suggestions.    Thank you for choosing the   [facility]!

## 2022-10-04 NOTE — TELEPHONE ENCOUNTER
Spoke with PCP's office and a message was sent to him to relay this message below.    Called neurology provider Sindy Elisabeth and left a message for gibson her patient care coordinator to discuss the message below.  Awaiting a call back.  Looking back at Elisabeth's last note, she also had concerns about patient's coginitive status and driving.  Did patient follow up with them for additional testing as was recommended at the last visit?     ----- Message from Deborah Green NP sent at 10/4/2022 11:12 AM CDT -----  Can we get a message to the patient's neurologist and pcp that I do not feel he is safe to be alone in his home and driving. He is not keeping his compression on the right leg even after we had a very long discussion about his velcro wrap and provided written instruction; he had not memory of this discussion and believes I told him to remove it. I have reported him as a vulnerable adult and nothing was done. He is not able to grasp the need for life long compression and the rationale behind this. He really needs to be moved to a higher level of care since the death of his wife. Does PCP have access to ? The community paramedics have gone out to his house and everything was in order aside from him not taking any of his medications.     Deborah Green DNP, RN, CNP, CWOCN, CFCN, CLT  [facility]  [phone]

## 2022-10-04 NOTE — PROGRESS NOTES
Follow up Vascular Visit       Date of Service:10/04/22      Chief Complaint: BLE swelling; BLE ulcers      Pt returns to [facility] with regards to their BLE swelling and ulcers.  They arrive today alone; his wife recently passed away. He is working with neurology on cognitive testing and memory issues. Community paramedic went out to see patient and his home is well cared for; he is well dressed; but reports he is not taking his medications. Last week we transitioned the right leg into velcro wraps; he was told several times to leave this on; to not remove; he arrives today and he has not recollection of this discussion; is arguing with us that we told him to remove this; arrives today with the velcro on the foot and ankle; should be applied just on the calf. They are currently using hydrofera blue ready to the wounds. This is being done by staff at our clinic weekly. They are using 2 layer on the left and velcro on the right for compression. They are feeling well today. Denies fevers, chills. No shortness of breath.     Allergies:   Allergies   Allergen Reactions     Cats Itching     Dust Mites Itching     Mold Itching     Levaquin [Levofloxacin]      Ankle swelling       Medications:   Current Outpatient Medications:      ADVAIR -21 MCG/ACT inhaler, INHALE 2 PUFFS BY MOUTH TWICE DAILY, Disp: , Rfl:      albuterol (PROAIR HFA/PROVENTIL HFA/VENTOLIN HFA) 108 (90 Base) MCG/ACT inhaler, Inhale 2 puffs into the lungs as needed for shortness of breath / dyspnea or wheezing, Disp: , Rfl:      ascorbic acid 1000 MG TABS tablet, Take 1,000 mg by mouth, Disp: , Rfl:      aspirin (ASA) 81 MG EC tablet, Take 81 mg by mouth daily, Disp: , Rfl:      BREZTRI AEROSPHERE 160-9-4.8 MCG/ACT AERO, INHALE 2 PUFFS BY MOUTH TWICE DAILY, Disp: , Rfl:      calcium carbonate-vitamin D (OS-JOSUE WITH D) 500-200 MG-UNIT tablet, Take 1 tablet by mouth, Disp: , Rfl:      cholecalciferol (DIALYVITE VITAMIN D  5000) 125 MCG (5000 UT) CAPS, Take 2 capsules (10,000 Units) by mouth daily, Disp: 90 capsule, Rfl: 3     donepezil (ARICEPT) 5 MG tablet, TAKE 1 TABLET(5 MG) BY MOUTH EVERY DAY, Disp: , Rfl:      ferrous sulfate (FEROSUL) 325 (65 Fe) MG tablet, Take 325 mg by mouth daily (with breakfast), Disp: , Rfl:      ketoconazole (NIZORAL A-D) 1 % shampoo, Externally apply topically as needed, Disp: , Rfl:      levothyroxine (SYNTHROID/LEVOTHROID) 100 MCG tablet, Take 100 mcg by mouth daily, Disp: , Rfl:      losartan (COZAAR) 100 MG tablet, Take 100 mg by mouth daily, Disp: , Rfl:      losartan (COZAAR) 25 MG tablet, , Disp: , Rfl:      multivitamin w/minerals (THERA-VIT-M) tablet, Take 1 tablet by mouth, Disp: , Rfl:      vitamin C (ASCORBIC ACID) 1000 MG TABS, Take 1 tablet (1,000 mg) by mouth 2 times daily, Disp: 180 tablet, Rfl: 3    Current Facility-Administered Medications:      lidocaine (XYLOCAINE) 2 % external gel, , Topical, Daily PRN, Deborah Green NP    History:   Past Medical History:   Diagnosis Date     Asthma      COPD (chronic obstructive pulmonary disease) (H)      Essential thrombocytopenia (H)      Graves disease      Hypertension      Hypothyroidism      Lumbar disc herniation      Testosterone deficiency        Physical Exam:    BP (!) 162/84   Pulse 76   Temp 97.6  F (36.4  C)   Resp 12   Wt 159 lb 14.4 oz (72.5 kg)   SpO2 97%   BMI 22.94 kg/m      General:  Patient presents to clinic in no apparent distress.  Head: normocephalic atraumatic  Psychiatric:  Alert and oriented x3.   Respiratory: unlabored breathing; no cough  Integumentary:  Skin is uniformly warm, dry and pink.    Wound #1 Location: left lateral leg  Size: 6.3L x 1.7W x 0.1depth.  No sinus tract present, Wound base: slough  No undermining present. Wound is full thickness. There is moderate drainage. Periwound: no denudement, erythema, induration, maceration or warmth.      Wound (used by OP [facility] only) 03/03/21 1237 Right (Active)    Number of days: 580       VASC Wound Lt lateral ankle (Active)   Pre Size Length 0 09/15/22 0900   Pre Size Width 0 09/15/22 0900   Pre Size Depth 0 09/15/22 0900   Pre Total Sq cm 0 09/15/22 0900   Product Used Alginate 08/30/22 1000   Number of days: 64       VASC Wound Lt lateral leg (Active)   Pre Size Length 6.3 10/04/22 1000   Pre Size Width 1.7 10/04/22 1000   Pre Size Depth 0.1 10/04/22 1000   Pre Total Sq cm 10.71 10/04/22 1000   Post Size Length 7.5 09/07/22 0900   Post Size Width 3 09/07/22 0900   Post Size Depth 0.1 09/07/22 0900   Post Total Sq cm 22.5 09/07/22 0900   Product Used Alginate 08/30/22 1000   Number of days: 64       VASC Wound Lt lower shin (Active)   Pre Size Length 0.2 10/04/22 1000   Pre Size Width 0.1 10/04/22 1000   Pre Size Depth 0.1 10/04/22 1000   Pre Total Sq cm 0.02 10/04/22 1000   Post Size Length 2.5 09/07/22 0900   Post Size Width 1.5 09/07/22 0900   Post Size Depth 0.1 09/07/22 0900   Post Total Sq cm 3.75 09/07/22 0900   Product Used ABD Pad 08/30/22 1000   Number of days: 64       VASC Wound Rt lateral leg (Active)   Pre Size Length 1 09/19/22 1100   Pre Size Width 0.2 09/19/22 1100   Pre Size Depth 0.1 09/19/22 1100   Pre Total Sq cm 0.2 09/19/22 1100   Post Size Length 2.4 09/07/22 0900   Post Size Width 0.3 09/07/22 0900   Post Size Depth 0.1 09/07/22 0900   Post Total Sq cm 0.72 09/07/22 0900   Description scab 09/27/22 0900   Product Used ABD Pad 08/30/22 1000   Number of days: 64       Incision/Surgical Site 11/04/20 Left Lower Eyelid (Active)   Number of days: 699       Incision/Surgical Site 07/09/22 Abdomen (Active)   Number of days: 87       Incision/Surgical Site 07/09/22 Bilateral Leg (Active)   Number of days: 87            Circumferential volume measures:      Circumferential Measures 8/23/2022 8/30/2022 9/7/2022 9/22/2022 10/4/2022   Right just above MTP 24 23 24.8 23.2 23.8   Right Ankle 23.4 25 25.1 23.6 24.8   Right Widest Calf 32.5 31 30.9 31 36   Left  - just above MTP 24 23 25.2 23.6 24.9   Left Ankle 23.3 26 25.5 25.4 24.4   Left Widest Calf 32.3 32 30.2 30.6 31.4       Labs:    I personally reviewed the following lab results today and those on care everywhere    CRP   Date Value Ref Range Status   07/08/2022 5.5 (H) 0.0 - <0.8 mg/dL Final      No results found for: SED   Last Renal Panel:  Sodium   Date Value Ref Range Status   07/10/2022 143 136 - 145 mmol/L Final     Potassium   Date Value Ref Range Status   07/10/2022 4.3 3.5 - 5.0 mmol/L Final     Chloride   Date Value Ref Range Status   07/10/2022 110 (H) 98 - 107 mmol/L Final     Carbon Dioxide (CO2)   Date Value Ref Range Status   07/10/2022 24 22 - 31 mmol/L Final     Anion Gap   Date Value Ref Range Status   07/10/2022 9 5 - 18 mmol/L Final     Glucose   Date Value Ref Range Status   07/10/2022 93 70 - 125 mg/dL Final     GLUCOSE BY METER POCT   Date Value Ref Range Status   07/10/2022 79 70 - 99 mg/dL Final     Urea Nitrogen   Date Value Ref Range Status   07/10/2022 45 (H) 8 - 28 mg/dL Final     Creatinine   Date Value Ref Range Status   07/10/2022 1.81 (H) 0.70 - 1.30 mg/dL Final     GFR Estimate   Date Value Ref Range Status   07/10/2022 37 (L) >60 mL/min/1.73m2 Final     Comment:     Effective December 21, 2021 eGFRcr in adults is calculated using the 2021 CKD-EPI creatinine equation which includes age and gender (Michell et al., NEJ, DOI: 10.1056/BHJDzl5732770)     Calcium   Date Value Ref Range Status   07/10/2022 7.9 (L) 8.5 - 10.5 mg/dL Final     Phosphorus   Date Value Ref Range Status   07/08/2022 5.6 (H) 2.5 - 4.5 mg/dL Final     Albumin   Date Value Ref Range Status   07/09/2022 3.0 (L) 3.5 - 5.0 g/dL Final      Lab Results   Component Value Date    WBC 10.9 07/10/2022     Lab Results   Component Value Date    RBC 3.25 07/10/2022     Lab Results   Component Value Date    HGB 10.2 07/10/2022     Lab Results   Component Value Date    HCT 33.6 07/10/2022     No components found for: MCT  Lab  Results   Component Value Date     07/10/2022     Lab Results   Component Value Date    MCH 31.4 07/10/2022     Lab Results   Component Value Date    MCHC 30.4 07/10/2022     Lab Results   Component Value Date    RDW 13.6 07/10/2022     Lab Results   Component Value Date     07/10/2022      No results found for: A1C   TSH   Date Value Ref Range Status   07/08/2022 0.49 0.30 - 5.00 uIU/mL Final      No results found for: VITDT                Impression:  Encounter Diagnoses   Name Primary?     Venous stasis ulcer of left lower leg with edema of left lower leg (H) Yes     Venous stasis ulcer of right lower leg with edema of right lower leg (H)      Edema of lower extremity      Venous (peripheral) insufficiency      Memory impairment                            Are any of these wounds new today: No; Location: na    Assessment/Plan:          1. Debridement: After discussion of risk factors and verbal consent was obtained 2% Lidocaine HCL jelly was applied, under clean conditions, the LLE ulceration(s) were debrided using currette. Devitalized and nonviable tissue, along with any fibrin and slough, was removed to improve granulation tissue formation, stimulate wound healing, decrease overall bacteria load, disrupt biofilm formation and decrease edge senescence.  Total excisional debridement was 10.71 sq cm from the epidermis/dermis area and into the subcutaneous tissue with a depth of 0.1 cm.   Ulcers were improved afterwards and .  Measures were unchanged after debridement.       2.  Wound treatment: wound treatment will include irrigation and dressings to promote autolytic debridement which will include:will continue with hydrofera blue ready to the left leg; change weekly at the clinic. If for some reason the patient is not able to get their dressing(s) changed as outlined above (due to illness, lack of supplies, lack of help) please do the following: remove old, soiled dressings; wash the wounds  with saline; pat dry; apply ABD pad or other absorbant pad and secure with rolled gauze; avoid tape directly on your skin; patient instructed to call the clinic as soon as possible to let us know what the current issues are in receiving wound care. Stable            3. Edema: educated pt on the need for lifelong compression; educated pt on the need to leave his velcro on at all times for now; educated pt on the proper donning and doffing of the velcro; does not go on the foot and ankle region but the calf; will use 2 layer on the left until healed. The compression wraps were applied today in clinic.     If a 2 layer or 4 layer compression wrap is being used; these are safe to have on for ONLY 7 days. If for some reason the patient is not able to get the wrap(s) changed (due to illness; lack of supplies, lack of help, lack of transportation) please do the following: unwrap the old 2 or 4 layer compression wrap; avoid using scissors as you could cut your skin and cause wounds; use tubular compression when available. Call to reschedule your home care or clinic visit appointment as soon as possible.  Stable            4. Nutrition: focus on protein; pt weight has stabilized           5. Offloading: na           6. Pt safety: sent message to pcp and neurology letting them know my concerns about him driving and memory issues with the compression; he should be in a higher level of care.     Patient will follow up with me in 1 weeks for reevaluation. They were instructed to call the clinic sooner with any signs or symptoms of infection or any further questions/concerns. Answered all questions.          Deborah Green DNP, RN, CNP, CWOCN, CFCN, CLT  [facility]   [phone]        This note was electronically signed by Deborah Green NP

## 2022-10-11 ENCOUNTER — OFFICE VISIT (OUTPATIENT)
Dept: VASCULAR SURGERY | Facility: CLINIC | Age: 82
End: 2022-10-11
Attending: NURSE PRACTITIONER
Payer: MEDICARE

## 2022-10-11 VITALS
DIASTOLIC BLOOD PRESSURE: 86 MMHG | SYSTOLIC BLOOD PRESSURE: 166 MMHG | RESPIRATION RATE: 18 BRPM | HEART RATE: 88 BPM | OXYGEN SATURATION: 99 % | WEIGHT: 160.3 LBS | BODY MASS INDEX: 23 KG/M2 | TEMPERATURE: 97.7 F

## 2022-10-11 DIAGNOSIS — L97.929 VENOUS STASIS ULCER OF LEFT LOWER LEG WITH EDEMA OF LEFT LOWER LEG (H): Primary | ICD-10-CM

## 2022-10-11 DIAGNOSIS — I83.892 VENOUS STASIS ULCER OF LEFT LOWER LEG WITH EDEMA OF LEFT LOWER LEG (H): Primary | ICD-10-CM

## 2022-10-11 DIAGNOSIS — I83.019 VENOUS STASIS ULCER OF RIGHT LOWER LEG WITH EDEMA OF RIGHT LOWER LEG (H): ICD-10-CM

## 2022-10-11 DIAGNOSIS — R41.3 MEMORY IMPAIRMENT: ICD-10-CM

## 2022-10-11 DIAGNOSIS — I83.891 VENOUS STASIS ULCER OF RIGHT LOWER LEG WITH EDEMA OF RIGHT LOWER LEG (H): ICD-10-CM

## 2022-10-11 DIAGNOSIS — R60.0 VENOUS STASIS ULCER OF LEFT LOWER LEG WITH EDEMA OF LEFT LOWER LEG (H): Primary | ICD-10-CM

## 2022-10-11 DIAGNOSIS — R60.0 VENOUS STASIS ULCER OF RIGHT LOWER LEG WITH EDEMA OF RIGHT LOWER LEG (H): ICD-10-CM

## 2022-10-11 DIAGNOSIS — L97.919 VENOUS STASIS ULCER OF RIGHT LOWER LEG WITH EDEMA OF RIGHT LOWER LEG (H): ICD-10-CM

## 2022-10-11 DIAGNOSIS — I83.029 VENOUS STASIS ULCER OF LEFT LOWER LEG WITH EDEMA OF LEFT LOWER LEG (H): Primary | ICD-10-CM

## 2022-10-11 DIAGNOSIS — I87.2 VENOUS (PERIPHERAL) INSUFFICIENCY: ICD-10-CM

## 2022-10-11 DIAGNOSIS — R60.0 EDEMA OF LOWER EXTREMITY: ICD-10-CM

## 2022-10-11 NOTE — PATIENT INSTRUCTIONS
"Wound care supplies were ordered today through [employer] and if you are not receiving your supplies or have a question on your bill please contact Johanne Ascencio at [phone]. Please allow 2-5 business days for delivery of supplies.     Elevate the legs for 30 minutes 3-4 times per day      Keep pressure off the wound area    Take daily Multi vitamin     Take 1 premier protein shake per day      Wound Care Instructions    1 TIMES PER WEEK and as needed, Cleanse your bilateral legs and leg wound(s) with Dilute hibiclens 30cc in 500cc NS.    Pat Dry with non-sterile gauze    Apply Lotion to the intact skin surrounding your wound and other dry skin locations. Some good lotions include: Remedy Skin Repair Cream, Sarna, Vanicream or Cetaphil    Primary Dressing: Apply endoform into/onto the wounds    Secondary dressing: Cover with ABD or gauze    Secure with non-sterile roll gauze (4\" x 75\" roll) and tape (1\" roll tape) as needed; avoid adhesive directly on the skin    Compression: 2 layer bilaterally    It is not ok to get your wound wet in the bath or shower      If for some reason you are not able to get your dressing(s) changed as outlined above (due to illness, lack of supplies, lack of help) please do the following: remove old, soiled dressings; wash the wounds with saline; pat dry; apply ABD pad or other absorbant pad and secure with rolled gauze; avoid tape directly on your skin; Call the clinic as soon as possible to let us know what the current issues are in receiving wound care [phone].      SEEK MEDICAL CARE IF:  You have an increase in swelling, pain, or redness around the wound.  You have an increase in the amount of pus coming from the wound.  There is a bad smell coming from the wound.  The wound appears to be worsening/enlarging  You have a fever greater than 101.5 F      It is ok to continue current wound care treatment/products for the next 2-3 days until new wound care supplies are ordered and " arrive. If longer than this please contact our office at [phone].    If you have a 2 layer or 4 layer compression wrap on these are safe to have on for ONLY 7 days. If for some reason you are not able to get the wrap(s) changed (due to illness; lack of supplies, lack of help, lack of transportation) please do the following: unwrap the old 2 or 4 layer compression wrap; avoid using scissors as you could cut your skin and cause wounds; use tubular compression when available. Call to reschedule your home care or clinic visit appointment as soon as possible.    Please NOTE: if you are 15 minutes late to your clinic appointment you will have to be rescheduled. Please call our clinic as soon as possible if you know you will not be able to get to your appointment at [phone].    If you fail to show up to 3 scheduled clinic appointments you will be dismissed from our clinic.        It is recommended that you do not get your ulcer wet when showering.  Listed below are several ways of keeping it dry when you shower.     1. Wrap it with Press and Seal plastic wrap.  It can be found in the stores where the plastic wraps or tin foil is kept.               2.  Some people take a bath and hang their leg/foot out of the tub.                        3  Put your leg in a plastic bag and tape it on.           4. You can purchase a shower cover for casts at some pharmacies and through the Internet.            5. Take a Bed Bath or wash up at the sink            We want to hear from you!  In the next few weeks, you should receive a call or email to complete a survey about your visit at [facility]. Please help us improve your appointment experience by letting us know how we did today. We strive to make your experience good and value any ways in which we could do better.      We value your input and suggestions.    Thank you for choosing the [facility]!

## 2022-10-11 NOTE — Clinical Note
I ordered home care for this patient for wound care and compression bandaging. He is very confused; can you have home care call the sister in law to set up visits? Katherin Mcleod [phone]

## 2022-10-11 NOTE — PROGRESS NOTES
Follow up Vascular Visit       Date of Service:10/11/22      Chief Complaint: Bilateral venous stasis ulcers and edema      Pt returns to [facility] with regards to their Bilateral venous stasis ulcers and edema.  They arrive today alone. They are currently using hydrofera blue ready to the wounds. This is being done by staff at our clinic weekly. They are using velcro on the right and 2 layer on the left for compression; he arrives today with a compression stocking on the right; this was not ordered by me and he states he returned his velcro wrap. They are feeling well today. Denies fevers, chills. No shortness of breath. I have reported him as a vulnerable adult. I have left messages with his pcp and neurologist about my concerns of him being alone.     Allergies:   Allergies   Allergen Reactions     Cats Itching     Dust Mites Itching     Mold Itching     Levaquin [Levofloxacin]      Ankle swelling       Medications:   Current Outpatient Medications:      ADVAIR -21 MCG/ACT inhaler, INHALE 2 PUFFS BY MOUTH TWICE DAILY, Disp: , Rfl:      albuterol (PROAIR HFA/PROVENTIL HFA/VENTOLIN HFA) 108 (90 Base) MCG/ACT inhaler, Inhale 2 puffs into the lungs as needed for shortness of breath / dyspnea or wheezing, Disp: , Rfl:      ascorbic acid 1000 MG TABS tablet, Take 1,000 mg by mouth, Disp: , Rfl:      aspirin (ASA) 81 MG EC tablet, Take 81 mg by mouth daily, Disp: , Rfl:      BREZTRI AEROSPHERE 160-9-4.8 MCG/ACT AERO, INHALE 2 PUFFS BY MOUTH TWICE DAILY, Disp: , Rfl:      calcium carbonate-vitamin D (OS-JOSUE WITH D) 500-200 MG-UNIT tablet, Take 1 tablet by mouth, Disp: , Rfl:      cholecalciferol (DIALYVITE VITAMIN D 5000) 125 MCG (5000 UT) CAPS, Take 2 capsules (10,000 Units) by mouth daily, Disp: 90 capsule, Rfl: 3     donepezil (ARICEPT) 5 MG tablet, TAKE 1 TABLET(5 MG) BY MOUTH EVERY DAY, Disp: , Rfl:      ferrous sulfate (FEROSUL) 325 (65 Fe) MG tablet, Take 325 mg by mouth daily  (with breakfast), Disp: , Rfl:      ketoconazole (NIZORAL A-D) 1 % shampoo, Externally apply topically as needed, Disp: , Rfl:      levothyroxine (SYNTHROID/LEVOTHROID) 100 MCG tablet, Take 100 mcg by mouth daily, Disp: , Rfl:      losartan (COZAAR) 100 MG tablet, Take 100 mg by mouth daily, Disp: , Rfl:      losartan (COZAAR) 25 MG tablet, , Disp: , Rfl:      multivitamin w/minerals (THERA-VIT-M) tablet, Take 1 tablet by mouth, Disp: , Rfl:      vitamin C (ASCORBIC ACID) 1000 MG TABS, Take 1 tablet (1,000 mg) by mouth 2 times daily, Disp: 180 tablet, Rfl: 3    Current Facility-Administered Medications:      lidocaine (XYLOCAINE) 2 % external gel, , Topical, Daily PRN, Deborah Green NP    History:   Past Medical History:   Diagnosis Date     Asthma      COPD (chronic obstructive pulmonary disease) (H)      Essential thrombocytopenia (H)      Graves disease      Hypertension      Hypothyroidism      Lumbar disc herniation      Testosterone deficiency        Physical Exam:    BP (!) 166/86   Pulse 88   Temp 97.7  F (36.5  C)   Resp 18   Wt 160 lb 4.8 oz (72.7 kg)   SpO2 99%   BMI 23.00 kg/m      General:  Patient presents to clinic in no apparent distress.  Head: normocephalic atraumatic  Psychiatric:  Alert and oriented x3.   Respiratory: unlabored breathing; no cough  Integumentary:  Skin is uniformly warm, dry and pink.    Wound #1 Location: left lateral leg  Size: 4.8L x 1.3W x 0.1depth.  No sinus tract present, Wound base: slough  No undermining present. Wound is full thickness. There is moderate drainage. Periwound: no denudement, erythema, induration, maceration or warmth.      Swelling is worse in the right leg    Wound (used by [facility] only) 03/03/21 1237 Right (Active)   Number of days: 587       VASC Wound Lt lateral ankle (Active)   Pre Size Length 0 09/15/22 0900   Pre Size Width 0 09/15/22 0900   Pre Size Depth 0 09/15/22 0900   Pre Total Sq cm 0 09/15/22 0900   Product Used Alginate 08/30/22 1000    Number of days: 71       VASC Wound Lt lateral leg (Active)   Pre Size Length 4.8 10/11/22 0900   Pre Size Width 1.3 10/11/22 0900   Pre Size Depth 0.1 10/11/22 0900   Pre Total Sq cm 6.24 10/11/22 0900   Post Size Length 7.5 09/07/22 0900   Post Size Width 3 09/07/22 0900   Post Size Depth 0.1 09/07/22 0900   Post Total Sq cm 22.5 09/07/22 0900   Product Used Alginate 08/30/22 1000   Number of days: 71       VASC Wound Lt lower shin (Active)   Pre Size Length 0.2 10/04/22 1000   Pre Size Width 0.1 10/04/22 1000   Pre Size Depth 0.1 10/04/22 1000   Pre Total Sq cm 0.02 10/04/22 1000   Post Size Length 2.5 09/07/22 0900   Post Size Width 1.5 09/07/22 0900   Post Size Depth 0.1 09/07/22 0900   Post Total Sq cm 3.75 09/07/22 0900   Description scabbed 10/11/22 0900   Product Used ABD Pad 08/30/22 1000   Number of days: 71       VASC Wound Rt lateral leg (Active)   Pre Size Length 1 09/19/22 1100   Pre Size Width 0.2 09/19/22 1100   Pre Size Depth 0.1 09/19/22 1100   Pre Total Sq cm 0.2 09/19/22 1100   Post Size Length 2.4 09/07/22 0900   Post Size Width 0.3 09/07/22 0900   Post Size Depth 0.1 09/07/22 0900   Post Total Sq cm 0.72 09/07/22 0900   Description scab 09/27/22 0900   Product Used ABD Pad 08/30/22 1000   Number of days: 71       Incision/Surgical Site 11/04/20 Left Lower Eyelid (Active)   Number of days: 706       Incision/Surgical Site 07/09/22 Abdomen (Active)   Number of days: 94       Incision/Surgical Site 07/09/22 Bilateral Leg (Active)   Number of days: 94            Circumferential volume measures:      Circumferential Measures 8/30/2022 9/7/2022 9/22/2022 10/4/2022 10/11/2022   Right just above MTP 23 24.8 23.2 23.8 24.1   Right Ankle 25 25.1 23.6 24.8 25.3   Right Widest Calf 31 30.9 31 36 35.4   Left - just above MTP 23 25.2 23.6 24.9 23.8   Left Ankle 26 25.5 25.4 24.4 23.3   Left Widest Calf 32 30.2 30.6 31.4 31.6       Labs:    I personally reviewed the following lab results today and those on  care everywhere    CRP   Date Value Ref Range Status   07/08/2022 5.5 (H) 0.0 - <0.8 mg/dL Final      No results found for: SED   Last Renal Panel:  Sodium   Date Value Ref Range Status   07/10/2022 143 136 - 145 mmol/L Final     Potassium   Date Value Ref Range Status   07/10/2022 4.3 3.5 - 5.0 mmol/L Final     Chloride   Date Value Ref Range Status   07/10/2022 110 (H) 98 - 107 mmol/L Final     Carbon Dioxide (CO2)   Date Value Ref Range Status   07/10/2022 24 22 - 31 mmol/L Final     Anion Gap   Date Value Ref Range Status   07/10/2022 9 5 - 18 mmol/L Final     Glucose   Date Value Ref Range Status   07/10/2022 93 70 - 125 mg/dL Final     GLUCOSE BY METER POCT   Date Value Ref Range Status   07/10/2022 79 70 - 99 mg/dL Final     Urea Nitrogen   Date Value Ref Range Status   07/10/2022 45 (H) 8 - 28 mg/dL Final     Creatinine   Date Value Ref Range Status   07/10/2022 1.81 (H) 0.70 - 1.30 mg/dL Final     GFR Estimate   Date Value Ref Range Status   07/10/2022 37 (L) >60 mL/min/1.73m2 Final     Comment:     Effective December 21, 2021 eGFRcr in adults is calculated using the 2021 CKD-EPI creatinine equation which includes age and gender (Michell et al., NEJ, DOI: 10.1056/LGFPnj7898824)     Calcium   Date Value Ref Range Status   07/10/2022 7.9 (L) 8.5 - 10.5 mg/dL Final     Phosphorus   Date Value Ref Range Status   07/08/2022 5.6 (H) 2.5 - 4.5 mg/dL Final     Albumin   Date Value Ref Range Status   07/09/2022 3.0 (L) 3.5 - 5.0 g/dL Final      Lab Results   Component Value Date    WBC 10.9 07/10/2022     Lab Results   Component Value Date    RBC 3.25 07/10/2022     Lab Results   Component Value Date    HGB 10.2 07/10/2022     Lab Results   Component Value Date    HCT 33.6 07/10/2022     No components found for: MCT  Lab Results   Component Value Date     07/10/2022     Lab Results   Component Value Date    MCH 31.4 07/10/2022     Lab Results   Component Value Date    [facility] 30.4 07/10/2022     Lab Results    Component Value Date    RDW 13.6 07/10/2022     Lab Results   Component Value Date     07/10/2022      No results found for: A1C   TSH   Date Value Ref Range Status   07/08/2022 0.49 0.30 - 5.00 uIU/mL Final      No results found for: VITDT                Impression:  Encounter Diagnoses   Name Primary?     Venous stasis ulcer of left lower leg with edema of left lower leg (H) Yes     Venous stasis ulcer of right lower leg with edema of right lower leg (H)      Edema of lower extremity      Venous (peripheral) insufficiency      Memory impairment           8/1/22 LLE         10/6/22 LLE            Are any of these wounds new today: No; Location: na    Assessment/Plan:          1. Debridement: After discussion of risk factors and verbal consent was obtained 2% Lidocaine HCL jelly was applied, under clean conditions, the LLE ulceration(s) were debrided using currette. Devitalized and nonviable tissue, along with any fibrin and slough, was removed to improve granulation tissue formation, stimulate wound healing, decrease overall bacteria load, disrupt biofilm formation and decrease edge senescence.  Total excisional debridement was 6.24 sq cm from the epidermis/dermis area and into the subcutaneous tissue with a depth of 0.1 cm.   Ulcers were improved afterwards and .  Measures were unchanged after debridement.       2.  Wound treatment: wound treatment will include irrigation and dressings to promote autolytic debridement which will include:will stop the hydrofera blue and go to endoform as the wound is now at skin level and viable. Change weekly at the clinic. If for some reason the patient is not able to get their dressing(s) changed as outlined above (due to illness, lack of supplies, lack of help) please do the following: remove old, soiled dressings; wash the wounds with saline; pat dry; apply ABD pad or other absorbant pad and secure with rolled gauze; avoid tape directly on your skin; patient  instructed to call the clinic as soon as possible to let us know what the current issues are in receiving wound care. Stable            3. Edema: will need to wrap both legs today with 2 layer; educated pt on the need to wear compression to his legs life long and he does not agree with this; explained this several times; he does not understand that the wounds are from his edema and the wounds will return if the swelling is not controlled. He arrives today with just the liner portion of the velcro wrap system in place; explained that this dose not offer compression and is just the liner; he did not agree with this. He states he returned his velcro; this is not possible; he obtained the velcro for our clinic and we applied this to his leg last week; this is somewhere at his house. The compression wraps were applied today in clinic.     If a 2 layer or 4 layer compression wrap is being used; these are safe to have on for ONLY 7 days. If for some reason the patient is not able to get the wrap(s) changed (due to illness; lack of supplies, lack of help, lack of transportation) please do the following: unwrap the old 2 or 4 layer compression wrap; avoid using scissors as you could cut your skin and cause wounds; use tubular compression when available. Call to reschedule your home care or clinic visit appointment as soon as possible.  Worsened on the right; stable on the left           4. Nutrition: weight has stabilized; initially lost 20 pounds after the death of his wife           5. Offloading: na           6. Vulnerable adult: [place] been [place] to the country; I have called his pcp and neurologist; I have obtained the pt consent today to call his brother and sister in laws; I will call them and let them know the situation; pt should not be driving; pt was told this today.  ADDENDUM: I called and spoke with is sister in law Katherin after obtaining consent from the patient. She lives in [place]. She would like home  care ordered. They have the patient on a waiting list for [facility].      Patient will follow up with me in 1 weeks for reevaluation. They were instructed to call the clinic sooner with any signs or symptoms of infection or any further questions/concerns. Answered all questions.          Deborah Green DNP, RN, CNP, CWOCN, CFCN, CLT  [facility]   [phone]        This note was electronically signed by Deborah Green NP

## 2022-10-12 ENCOUNTER — TELEPHONE (OUTPATIENT)
Dept: VASCULAR SURGERY | Facility: CLINIC | Age: 82
End: 2022-10-12

## 2022-10-12 NOTE — TELEPHONE ENCOUNTER
Home care is needing verbal orders to extend the SOC 10/24/22 per patient request.    Okay to leave a detailed message: [phone]

## 2022-10-13 NOTE — TELEPHONE ENCOUNTER
Sophia is calling stating they spoke with Katherin the daughter in law and she requested the start of care to begin on 10/24. That is when they will be back in town.  Sophia is aware the patient has memory issues, this is why she was coordinating the start date with Katherin.

## 2022-10-13 NOTE — TELEPHONE ENCOUNTER
Called and left Sophia at [facility] care a message that Deborah does not want home care delayed. Also let Sophia know that patient's chart should be updated to reflect family contact for all appointments and questions. Provided sister-in-law Katherin Mcleod phone number [phone]. If any further questions or concerns please call clinic back.      They were supposed to call the family not the patient due to his memory and behavior issues; no should not delay home care     LK

## 2022-10-18 ENCOUNTER — TELEPHONE (OUTPATIENT)
Dept: VASCULAR SURGERY | Facility: CLINIC | Age: 82
End: 2022-10-18

## 2022-10-18 ENCOUNTER — ALLIED HEALTH/NURSE VISIT (OUTPATIENT)
Dept: VASCULAR SURGERY | Facility: CLINIC | Age: 82
End: 2022-10-18
Attending: NURSE PRACTITIONER
Payer: MEDICARE

## 2022-10-18 VITALS
HEART RATE: 76 BPM | TEMPERATURE: 98.1 F | RESPIRATION RATE: 12 BRPM | DIASTOLIC BLOOD PRESSURE: 70 MMHG | SYSTOLIC BLOOD PRESSURE: 148 MMHG | BODY MASS INDEX: 23.48 KG/M2 | HEIGHT: 70 IN | WEIGHT: 164 LBS

## 2022-10-18 DIAGNOSIS — I83.892 VENOUS STASIS ULCER OF LEFT LOWER LEG WITH EDEMA OF LEFT LOWER LEG (H): Primary | ICD-10-CM

## 2022-10-18 DIAGNOSIS — I83.019 VENOUS STASIS ULCER OF RIGHT LOWER LEG WITH EDEMA OF RIGHT LOWER LEG (H): ICD-10-CM

## 2022-10-18 DIAGNOSIS — I83.029 VENOUS STASIS ULCER OF LEFT LOWER LEG WITH EDEMA OF LEFT LOWER LEG (H): Primary | ICD-10-CM

## 2022-10-18 DIAGNOSIS — L97.929 VENOUS STASIS ULCER OF LEFT LOWER LEG WITH EDEMA OF LEFT LOWER LEG (H): Primary | ICD-10-CM

## 2022-10-18 DIAGNOSIS — L97.919 VENOUS STASIS ULCER OF RIGHT LOWER LEG WITH EDEMA OF RIGHT LOWER LEG (H): ICD-10-CM

## 2022-10-18 DIAGNOSIS — I87.2 VENOUS (PERIPHERAL) INSUFFICIENCY: ICD-10-CM

## 2022-10-18 DIAGNOSIS — I83.891 VENOUS STASIS ULCER OF RIGHT LOWER LEG WITH EDEMA OF RIGHT LOWER LEG (H): ICD-10-CM

## 2022-10-18 DIAGNOSIS — R60.0 VENOUS STASIS ULCER OF LEFT LOWER LEG WITH EDEMA OF LEFT LOWER LEG (H): Primary | ICD-10-CM

## 2022-10-18 DIAGNOSIS — R60.0 VENOUS STASIS ULCER OF RIGHT LOWER LEG WITH EDEMA OF RIGHT LOWER LEG (H): ICD-10-CM

## 2022-10-18 PROCEDURE — 97602 WOUND(S) CARE NON-SELECTIVE: CPT

## 2022-10-18 NOTE — PROGRESS NOTES
"  Bilateral leg swelling    Right lateral leg wound    Left lateral lower leg wound    [facility]  -  Nurse Visit        Date of Service:  October 18, 2022     Requesting Provider: TOI Green    Diagnosis:     ICD-10-CM    1. Venous stasis ulcer of left lower leg with edema of left lower leg (H)  I83.029     I83.892     L97.929     R60.9       2. Venous stasis ulcer of right lower leg with edema of right lower leg (H)  I83.019     I83.891     L97.919     R60.9       3. Venous (peripheral) insufficiency  I87.2           Chief Complaint: Ramu is being seen today at [facility] [place] for his wound(s) and swelling dressing change. Ramu arrives to the clinic today with Patient. Reports pain of 0.  Denies any fevers, chills, or generalized ill feeling. Patient thought it was Wednesday today. Was late to apt after his daughter in law was called and notified him to come. Homecare is no start 10/24/22 advised to come back for dressing change if doesn't start.patient doesn't like 2 layer compression wrap.    Dressing on Arrival: endoform,gauze.roll gauze bilateral 2 layers intact . Did fall down 2 inches., Pt is currently using bilateral 2 layer compression wraps. for compression and did tolerate well. Upon removal of dressings light Serous drainage is noted.    New Wounds noted: No    Vital Signs: BP (!) 148/70   Pulse 76   Temp 98.1  F (36.7  C)   Resp 12   Ht 5' 10\" (1.778 m)   Wt 164 lb (74.4 kg)   BMI 23.53 kg/m      Assessment:      General:  Patient presents to clinic in no apparent distress.   Psychiatric:  Alert and oriented x3.   Lower extremity:  edema is present.    Integumentary:  Skin is hemosciderin    Circumferential volume measures:  Circumferential Measures 9/7/2022 9/22/2022 10/4/2022 10/11/2022 10/18/2022   Right just above MTP 24.8 23.2 23.8 24.1 23   Right Ankle 25.1 23.6 24.8 25.3 24.5   Right Widest Calf 30.9 31 36 35.4 35   Left - just above " MTP 25.2 23.6 24.9 23.8 23   Left Ankle 25.5 25.4 24.4 23.3 24   Left Widest Calf 30.2 30.6 31.4 31.6 34.5       Wound info:  Wound (used by [facility] only) 03/03/21 1237 Right (Active)       VASC Wound Lt lateral ankle (Active)   Pre Size Length 0 09/15/22 0900   Pre Size Width 0 09/15/22 0900   Pre Size Depth 0 09/15/22 0900   Pre Total Sq cm 0 09/15/22 0900   Product Used Alginate 08/30/22 1000       VASC Wound Lt lateral leg (Active)   Pre Size Length 4.5 10/18/22 1500   Pre Size Width 1.2 10/18/22 1500   Pre Size Depth 0.1 10/18/22 1500   Pre Total Sq cm 5.4 10/18/22 1500   Post Size Length 7.5 09/07/22 0900   Post Size Width 3 09/07/22 0900   Post Size Depth 0.1 09/07/22 0900   Post Total Sq cm 22.5 09/07/22 0900   Product Used Collagen 10/18/22 1500       VASC Wound Lt lower shin (Active)   Pre Size Length 0.2 10/04/22 1000   Pre Size Width 0.1 10/04/22 1000   Pre Size Depth 0.1 10/04/22 1000   Pre Total Sq cm 0.02 10/04/22 1000   Post Size Length 2.5 09/07/22 0900   Post Size Width 1.5 09/07/22 0900   Post Size Depth 0.1 09/07/22 0900   Post Total Sq cm 3.75 09/07/22 0900   Description scabbed 10/18/22 1500   Product Used ABD Pad 08/30/22 1000       VASC Wound Rt lateral leg (Active)   Pre Size Length 1 09/19/22 1100   Pre Size Width 0.2 09/19/22 1100   Pre Size Depth 0.1 09/19/22 1100   Pre Total Sq cm 0.2 09/19/22 1100   Post Size Length 2.4 09/07/22 0900   Post Size Width 0.3 09/07/22 0900   Post Size Depth 0.1 09/07/22 0900   Post Total Sq cm 0.72 09/07/22 0900   Description scab 10/18/22 1500   Product Used ABD Pad 08/30/22 1000       Incision/Surgical Site 11/04/20 Left Lower Eyelid (Active)       Incision/Surgical Site 07/09/22 Abdomen (Active)       Incision/Surgical Site 07/09/22 Bilateral Leg (Active)       Undermining is not present.    The periwoundskin is WNL      Plan:         1. Patient will return in 11/1/22 weeks for consult.            2. As listed below, treatment provided irrigation,  mechanical cleansing, and dressings to promote autolytic debridement.             Cleansed with: Dilute hibiclens 30cc in 500cc NS/soap and water    Protected skin with: Remedy skin repair lotion    Dressings Applied to wound: Endoform, Gauze and Secured with roll gauze and tape.     Compression Applied to the right le-Layer Compression  Compression Applied to the left le-Layer Compression    2-Layer [place]: I Applied the inner foam layer with the foot dorsiflexed and started atthe base of the fifth metatarsal head. I left the bottom of the heel exposed, and proceed by winding the foam up the leg using minimal overlap to just below the fibular head. I then applied the compression layer with the foot dorsiflexed and startingat the base of the fifth metatarsal head. I applied at full stretch and proceeded up the leg using 50% overlap. The bottom of the heel is covered with the compression layer up to the end at the fibular head just below the back of the knee and levelwith the top edge of the foam layer.  I gently pressed and conformed the entire surface of the system to ensurethat the two layers are firmly bound together      Offloading used: None    Trial Products: No    Provider notified regarding concerns: No    Treatment Changes: No    Tolerated Dressing Change:  Yes    Taught Regarding: follow up appointment(s), wound cares, compression, layered compression wraps - maximum wear time of 7 days and elevation/homecare to start    Educational Barriers: Cognitive/memory/forgetfulness      Neri Garcia RN

## 2022-10-18 NOTE — TELEPHONE ENCOUNTER
Spoke with patients daughter in law. She will talk with patient about importance of needing compression wraps changed  Today or tomorrow. Missed 10/18/22 apt at 1pm for Nurse visit to change.

## 2022-10-18 NOTE — PATIENT INSTRUCTIONS
"Please call [phone] if homecare doesn't come 10/24/22.    Elevate the legs for 30 minutes 3-4 times per day      Keep pressure off the wound area    Take daily Multi vitamin     Take 1 premier protein shake per day      Wound Care Instructions    1 TIMES PER WEEK and as needed, Cleanse your bilateral legs and leg wound(s) with Dilute hibiclens 30cc in 500cc NS.    Pat Dry with non-sterile gauze    Apply Lotion to the intact skin surrounding your wound and other dry skin locations. Some good lotions include: Remedy Skin Repair Cream, Sarna, Vanicream or Cetaphil    Primary Dressing: Apply endoform into/onto the wounds    Secondary dressing: Cover with ABD or gauze    Secure with non-sterile roll gauze (4\" x 75\" roll) and tape (1\" roll tape) as needed; avoid adhesive directly on the skin    Compression: 2 layer bilaterally    It is not ok to get your wound wet in the bath or shower      If for some reason you are not able to get your dressing(s) changed as outlined above (due to illness, lack of supplies, lack of help) please do the following: remove old, soiled dressings; wash the wounds with saline; pat dry; apply ABD pad or other absorbant pad and secure with rolled gauze; avoid tape directly on your skin; Call the clinic as soon as possible to let us know what the current issues are in receiving wound care [phone].      SEEK MEDICAL CARE IF:  You have an increase in swelling, pain, or redness around the wound.  You have an increase in the amount of pus coming from the wound.  There is a bad smell coming from the wound.  The wound appears to be worsening/enlarging  You have a fever greater than 101.5 F      It is ok to continue current wound care treatment/products for the next 2-3 days until new wound care supplies are ordered and arrive. If longer than this please contact our office at [phone].    If you have a 2 layer or 4 layer compression wrap on these are safe to have on for ONLY 7 days. If for " some reason you are not able to get the wrap(s) changed (due to illness; lack of supplies, lack of help, lack of transportation) please do the following: unwrap the old 2 or 4 layer compression wrap; avoid using scissors as you could cut your skin and cause wounds; use tubular compression when available. Call to reschedule your home care or clinic visit appointment as soon as possible.    Please NOTE: if you are 15 minutes late to your clinic appointment you will have to be rescheduled. Please call our clinic as soon as possible if you know you will not be able to get to your appointment at [phone].    If you fail to show up to 3 scheduled clinic appointments you will be dismissed from our clinic.        It is recommended that you do not get your ulcer wet when showering.  Listed below are several ways of keeping it dry when you shower.     1. Wrap it with Press and Seal plastic wrap.  It can be found in the stores where the plastic wraps or tin foil is kept.               2.  Some people take a bath and hang their leg/foot out of the tub.                        3  Put your leg in a plastic bag and tape it on.           4. You can purchase a shower cover for casts at some pharmacies and through the Internet.            5. Take a Bed Bath or wash up at the sink            We want to hear from you!  In the next few weeks, you should receive a call or email to complete a survey about your visit at [facility]. Please help us improve your appointment experience by letting us know how we did today. We strive to make your experience good and value any ways in which we could do better.      We value your input and suggestions.    Thank you for choosing the [facility]!

## 2022-10-24 ENCOUNTER — TELEPHONE (OUTPATIENT)
Dept: VASCULAR SURGERY | Facility: CLINIC | Age: 82
End: 2022-10-24

## 2022-10-24 NOTE — TELEPHONE ENCOUNTER
Spoke to Katherin and let her know that patient is being added to the schedule for a nurse visit tomorrow at 3pm.     She asked that we call Ramu to update him in about an hour, he is currently in a meeting.

## 2022-10-24 NOTE — TELEPHONE ENCOUNTER
Katherin calling asking if this patient has an appointment scheduled for this week.  She expected him to be seen weekly.  She states that the patient refused home care and his next scheduled appointment is with Deobrah Real on 11/1/2022.  Katherin is asking for a call back to discuss: [phone]

## 2022-10-24 NOTE — TELEPHONE ENCOUNTER
Got updated phone number for Ramu, left him message at [phone] to come in for nurse visit tomorrow at 3pm

## 2022-10-25 ENCOUNTER — DOCUMENTATION ONLY (OUTPATIENT)
Dept: VASCULAR SURGERY | Facility: CLINIC | Age: 82
End: 2022-10-25

## 2022-10-25 ENCOUNTER — ALLIED HEALTH/NURSE VISIT (OUTPATIENT)
Dept: VASCULAR SURGERY | Facility: CLINIC | Age: 82
End: 2022-10-25
Attending: NURSE PRACTITIONER
Payer: MEDICARE

## 2022-10-25 VITALS
WEIGHT: 160.9 LBS | BODY MASS INDEX: 23.03 KG/M2 | SYSTOLIC BLOOD PRESSURE: 160 MMHG | HEART RATE: 72 BPM | DIASTOLIC BLOOD PRESSURE: 80 MMHG | HEIGHT: 70 IN | TEMPERATURE: 98.1 F | RESPIRATION RATE: 12 BRPM

## 2022-10-25 DIAGNOSIS — R41.3 MEMORY IMPAIRMENT: ICD-10-CM

## 2022-10-25 DIAGNOSIS — I83.029 VENOUS STASIS ULCER OF LEFT LOWER LEG WITH EDEMA OF LEFT LOWER LEG (H): Primary | ICD-10-CM

## 2022-10-25 DIAGNOSIS — R60.0 EDEMA OF LOWER EXTREMITY: ICD-10-CM

## 2022-10-25 DIAGNOSIS — I83.892 VENOUS STASIS ULCER OF LEFT LOWER LEG WITH EDEMA OF LEFT LOWER LEG (H): Primary | ICD-10-CM

## 2022-10-25 DIAGNOSIS — L97.919 VENOUS STASIS ULCER OF RIGHT LOWER LEG WITH EDEMA OF RIGHT LOWER LEG (H): ICD-10-CM

## 2022-10-25 DIAGNOSIS — L97.929 VENOUS STASIS ULCER OF LEFT LOWER LEG WITH EDEMA OF LEFT LOWER LEG (H): Primary | ICD-10-CM

## 2022-10-25 DIAGNOSIS — R60.0 VENOUS STASIS ULCER OF LEFT LOWER LEG WITH EDEMA OF LEFT LOWER LEG (H): Primary | ICD-10-CM

## 2022-10-25 DIAGNOSIS — I87.2 VENOUS (PERIPHERAL) INSUFFICIENCY: ICD-10-CM

## 2022-10-25 DIAGNOSIS — R60.0 VENOUS STASIS ULCER OF RIGHT LOWER LEG WITH EDEMA OF RIGHT LOWER LEG (H): ICD-10-CM

## 2022-10-25 DIAGNOSIS — I83.019 VENOUS STASIS ULCER OF RIGHT LOWER LEG WITH EDEMA OF RIGHT LOWER LEG (H): ICD-10-CM

## 2022-10-25 DIAGNOSIS — I83.891 VENOUS STASIS ULCER OF RIGHT LOWER LEG WITH EDEMA OF RIGHT LOWER LEG (H): ICD-10-CM

## 2022-10-25 PROCEDURE — 97602 WOUND(S) CARE NON-SELECTIVE: CPT

## 2022-10-25 ASSESSMENT — PAIN SCALES - GENERAL: PAINLEVEL: NO PAIN (0)

## 2022-10-25 NOTE — PROGRESS NOTES
"  Right leg post endoform,scab removal    Right lateral     Left leg      Leg lateral leg    [facility]  -  Nurse Visit        Date of Service:  October 25, 2022     Requesting Provider: TOI Green    Diagnosis:     ICD-10-CM    1. Venous stasis ulcer of left lower leg with edema of left lower leg (H)  I83.029     I83.892     L97.929     R60.9       2. Venous stasis ulcer of right lower leg with edema of right lower leg (H)  I83.019     I83.891     L97.919     R60.9       3. Venous (peripheral) insufficiency  I87.2       4. Edema of lower extremity  R60.0       5. Memory impairment  R41.3           Chief Complaint: Ramu is being seen today at [facility] [place] for his wound(s) and swelling dressing change. Ramu arrives to the clinic today with Patient. Reports pain of 0/itching.  Denies any fevers, chills, or generalized ill feeling. Patient refused homecare/ asked question why we want him to have home care several times. sister in law visiting over night.    Dressing on Arrival: bilateral 2 layer intact,endoform, Pt is currently using bilateral 2 layer for compression and did tolerate well. Refuses to have them applied at correct height. Upon removal of dressings light Serosanguinous drainage is noted.    New Wounds noted: No    Vital Signs: BP (!) 160/80   Pulse 72   Temp 98.1  F (36.7  C)   Resp 12   Ht 5' 10\" (1.778 m)   Wt 160 lb 14.4 oz (73 kg)   BMI 23.09 kg/m      Assessment:      General:  Patient presents to clinic in no apparent distress.   Psychiatric:  Alert and oriented x3.   Lower extremity:  edema is present.    Integumentary:  Skin is wnl2    Circumferential volume measures:  Circumferential Measures 9/22/2022 10/4/2022 10/11/2022 10/18/2022 10/25/2022   Right just above MTP 23.2 23.8 24.1 23 23   Right Ankle 23.6 24.8 25.3 24.5 24   Right Widest Calf 31 36 35.4 35 36   Left - just above MTP 23.6 24.9 23.8 23 23   Left Ankle 25.4 24.4 23.3 24 " 24   Left Widest Calf 30.6 31.4 31.6 34.5 38       Wound info:  Wound (used by OP [facility] only) 03/03/21 1237 Right (Active)       VASC Wound Lt lateral ankle (Active)   Pre Size Length 0 09/15/22 0900   Pre Size Width 0 09/15/22 0900   Pre Size Depth 0 09/15/22 0900   Pre Total Sq cm 0 09/15/22 0900   Product Used Alginate 08/30/22 1000       VASC Wound Lt lateral leg (Active)   Pre Size Length 4.5 10/25/22 1500   Pre Size Width 0.8 10/25/22 1500   Pre Size Depth 0.1 10/25/22 1500   Pre Total Sq cm 3.6 10/25/22 1500   Post Size Length 7.5 09/07/22 0900   Post Size Width 3 09/07/22 0900   Post Size Depth 0.1 09/07/22 0900   Post Total Sq cm 22.5 09/07/22 0900   Product Used Collagen 10/25/22 1500       VASC Wound Lt lower shin (Active)   Pre Size Length 0.5 10/25/22 1500   Pre Size Width 0.5 10/25/22 1500   Pre Size Depth 0.1 10/25/22 1500   Pre Total Sq cm 0.25 10/25/22 1500   Post Size Length 2.5 09/07/22 0900   Post Size Width 1.5 09/07/22 0900   Post Size Depth 0.1 09/07/22 0900   Post Total Sq cm 3.75 09/07/22 0900   Description scabbed 10/18/22 1500   Product Used Collagen 10/25/22 1500       VASC Wound Rt lateral leg (Active)   Pre Size Length 0.3 10/25/22 1500   Pre Size Width 0.3 10/25/22 1500   Pre Size Depth 0.1 10/25/22 1500   Pre Total Sq cm 0.09 10/25/22 1500   Post Size Length 2.4 09/07/22 0900   Post Size Width 0.3 09/07/22 0900   Post Size Depth 0.1 09/07/22 0900   Post Total Sq cm 0.72 09/07/22 0900   Description scab 10/18/22 1500   Product Used Collagen 10/25/22 1500       Incision/Surgical Site 11/04/20 Left Lower Eyelid (Active)       Incision/Surgical Site 07/09/22 Abdomen (Active)       Incision/Surgical Site 07/09/22 Bilateral Leg (Active)       Undermining is not present.    The periwoundskin is WNL      Plan:         1. Patient will return in 8 days for consult.            2. As listed below, treatment provided irrigation, mechanical cleansing, and dressings to promote autolytic debridement.              Cleansed with: Dilute hibiclens 30cc in 500cc NS and soap and water    Protected skin with: Remedy skin repair lotion    Dressings Applied to wound: Endoform and Secured with roll gauze and tape.     Compression Applied to the right le-Layer Compression  Compression Applied to the left le-Layer Compression    2-Layer [place]: I Applied the inner foam layer with the foot dorsiflexed and started atthe base of the fifth metatarsal head. I left the bottom of the heel exposed, and proceed by winding the foam up the leg using minimal overlap to just below the fibular head. I then applied the compression layer with the foot dorsiflexed and startingat the base of the fifth metatarsal head. I applied at full stretch and proceeded up the leg using 50% overlap. The bottom of the heel is covered with the compression layer up to the end at the fibular head just below the back of the knee and levelwith the top edge of the foam layer.  I gently pressed and conformed the entire surface of the system to ensurethat the two layers are firmly bound together      Offloading used: None    Trial Products: No    Provider notified regarding concerns: No    Treatment Changes: No    Tolerated Dressing Change:  Yes    Taught Regarding: follow up appointment(s), wound cares, compression and elevation    Educational Barriers: memory loss      Neri Garcia RN

## 2022-10-25 NOTE — PATIENT INSTRUCTIONS
"Please bring extra hydrofera blue and any compression socks or wraps to next appointments.    Elevate the legs for 30 minutes 3-4 times per day      Keep pressure off the wound area    Take daily Multi vitamin     Take 1 premier protein shake per day      Wound Care Instructions    1 TIMES PER WEEK and as needed, Cleanse your bilateral legs and leg wound(s) with Dilute hibiclens 30cc in 500cc NS.    Pat Dry with non-sterile gauze    Apply Lotion to the intact skin surrounding your wound and other dry skin locations. Some good lotions include: Remedy Skin Repair Cream, Sarna, Vanicream or Cetaphil    Primary Dressing: Apply endoform into/onto the wounds    Secondary dressing: Cover with ABD or gauze    Secure with non-sterile roll gauze (4\" x 75\" roll) and tape (1\" roll tape) as needed; avoid adhesive directly on the skin    Compression: 2 layer bilaterally    It is not ok to get your wound wet in the bath or shower      If for some reason you are not able to get your dressing(s) changed as outlined above (due to illness, lack of supplies, lack of help) please do the following: remove old, soiled dressings; wash the wounds with saline; pat dry; apply ABD pad or other absorbant pad and secure with rolled gauze; avoid tape directly on your skin; Call the clinic as soon as possible to let us know what the current issues are in receiving wound care [phone].      SEEK MEDICAL CARE IF:  You have an increase in swelling, pain, or redness around the wound.  You have an increase in the amount of pus coming from the wound.  There is a bad smell coming from the wound.  The wound appears to be worsening/enlarging  You have a fever greater than 101.5 F      It is ok to continue current wound care treatment/products for the next 2-3 days until new wound care supplies are ordered and arrive. If longer than this please contact our office at [phone].    If you have a 2 layer or 4 layer compression wrap on these are safe to " have on for ONLY 7 days. If for some reason you are not able to get the wrap(s) changed (due to illness; lack of supplies, lack of help, lack of transportation) please do the following: unwrap the old 2 or 4 layer compression wrap; avoid using scissors as you could cut your skin and cause wounds; use tubular compression when available. Call to reschedule your home care or clinic visit appointment as soon as possible.    Please NOTE: if you are 15 minutes late to your clinic appointment you will have to be rescheduled. Please call our clinic as soon as possible if you know you will not be able to get to your appointment at [phone].    If you fail to show up to 3 scheduled clinic appointments you will be dismissed from our clinic.        It is recommended that you do not get your ulcer wet when showering.  Listed below are several ways of keeping it dry when you shower.     1. Wrap it with Press and Seal plastic wrap.  It can be found in the stores where the plastic wraps or tin foil is kept.               2.  Some people take a bath and hang their leg/foot out of the tub.                        3  Put your leg in a plastic bag and tape it on.           4. You can purchase a shower cover for casts at some pharmacies and through the Internet.            5. Take a Bed Bath or wash up at the sink            We want to hear from you!  In the next few weeks, you should receive a call or email to complete a survey about your visit at [facility]. Please help us improve your appointment experience by letting us know how we did today. We strive to make your experience good and value any ways in which we could do better.      We value your input and suggestions.    Thank you for choosing the [facility]!

## 2022-10-25 NOTE — PROGRESS NOTES
"I was alerted by nursing staff of continued issues with the patient compliance; not coming to appt; refused home care. I reported him again as a vulnerable adult; will have nursing staff contact his family again.    Your form has been submitted to the [place] [facility] ([facility]).    Required notifications to Law Enforcement and Emergency Protective Services will be made by [facility]. This report will be referred to the [employer] by [facility]. If the  requested information regarding the status of this report, the information will come from the [employer] responsible. This notification will not come from [facility].    If you want to print or save this form in PDF format for your records, click the \"Print\" button below. If you want a reference number for your report, record and save the \"Report ID\" below.    Date/Time Submitted:   Tue Oct 25 2022 09:28:00      Web Report Number:  [id number]          [facility] is not able to provide any further information regarding the status of submitted reports.  "

## 2022-11-02 ENCOUNTER — OFFICE VISIT (OUTPATIENT)
Dept: VASCULAR SURGERY | Facility: CLINIC | Age: 82
End: 2022-11-02
Attending: NURSE PRACTITIONER
Payer: MEDICARE

## 2022-11-02 VITALS
DIASTOLIC BLOOD PRESSURE: 84 MMHG | HEART RATE: 70 BPM | WEIGHT: 157.3 LBS | RESPIRATION RATE: 16 BRPM | SYSTOLIC BLOOD PRESSURE: 164 MMHG | TEMPERATURE: 98.2 F | BODY MASS INDEX: 22.57 KG/M2

## 2022-11-02 DIAGNOSIS — R41.3 MEMORY IMPAIRMENT: ICD-10-CM

## 2022-11-02 DIAGNOSIS — I83.891 VENOUS STASIS ULCER OF RIGHT LOWER LEG WITH EDEMA OF RIGHT LOWER LEG (H): ICD-10-CM

## 2022-11-02 DIAGNOSIS — I87.2 VENOUS (PERIPHERAL) INSUFFICIENCY: ICD-10-CM

## 2022-11-02 DIAGNOSIS — I83.892 VENOUS STASIS ULCER OF LEFT LOWER LEG WITH EDEMA OF LEFT LOWER LEG (H): Primary | ICD-10-CM

## 2022-11-02 DIAGNOSIS — R60.0 EDEMA OF LOWER EXTREMITY: ICD-10-CM

## 2022-11-02 DIAGNOSIS — L97.929 VENOUS STASIS ULCER OF LEFT LOWER LEG WITH EDEMA OF LEFT LOWER LEG (H): Primary | ICD-10-CM

## 2022-11-02 DIAGNOSIS — I83.029 VENOUS STASIS ULCER OF LEFT LOWER LEG WITH EDEMA OF LEFT LOWER LEG (H): Primary | ICD-10-CM

## 2022-11-02 DIAGNOSIS — R60.0 VENOUS STASIS ULCER OF RIGHT LOWER LEG WITH EDEMA OF RIGHT LOWER LEG (H): ICD-10-CM

## 2022-11-02 DIAGNOSIS — I83.019 VENOUS STASIS ULCER OF RIGHT LOWER LEG WITH EDEMA OF RIGHT LOWER LEG (H): ICD-10-CM

## 2022-11-02 DIAGNOSIS — R60.0 VENOUS STASIS ULCER OF LEFT LOWER LEG WITH EDEMA OF LEFT LOWER LEG (H): Primary | ICD-10-CM

## 2022-11-02 DIAGNOSIS — L97.919 VENOUS STASIS ULCER OF RIGHT LOWER LEG WITH EDEMA OF RIGHT LOWER LEG (H): ICD-10-CM

## 2022-11-02 PROCEDURE — 250N000009 HC RX 250: Performed by: NURSE PRACTITIONER

## 2022-11-02 PROCEDURE — 11042 DBRDMT SUBQ TIS 1ST 20SQCM/<: CPT | Performed by: NURSE PRACTITIONER

## 2022-11-02 RX ADMIN — LIDOCAINE HYDROCHLORIDE 5 ML: 20 JELLY TOPICAL at 09:48

## 2022-11-02 ASSESSMENT — PAIN SCALES - GENERAL: PAINLEVEL: NO PAIN (0)

## 2022-11-02 NOTE — PATIENT INSTRUCTIONS
"Please bring extra hydrofera blue and any compression socks or wraps to next appointments.    Elevate the legs for 30 minutes 3-4 times per day      Keep pressure off the wound area    Take daily Multi vitamin     Take 1 premier protein shake per day      Wound Care Instructions    1 TIMES PER WEEK and as needed, Cleanse your bilateral legs and leg wound(s) with Dilute hibiclens 30cc in 500cc NS.    Pat Dry with non-sterile gauze    Apply Lotion to the intact skin surrounding your wound and other dry skin locations. Some good lotions include: Remedy Skin Repair Cream, Sarna, Vanicream or Cetaphil    Primary Dressing: Apply hydrofera blue ready into/onto the wounds    Secondary dressing: Cover with ABD or gauze    Secure with non-sterile roll gauze (4\" x 75\" roll) and tape (1\" roll tape) as needed; avoid adhesive directly on the skin    Compression: 2 layer to the left leg and double tubular compression on the right leg; at nurse visits please ensure the patient has this on; please remove the tubular compression and wash and lotion the leg for the patient; please write on the tubular compression \"do NOT remove\" to help remind the patient not to remove.    It is not ok to get your wound wet in the bath or shower      If for some reason you are not able to get your dressing(s) changed as outlined above (due to illness, lack of supplies, lack of help) please do the following: remove old, soiled dressings; wash the wounds with saline; pat dry; apply ABD pad or other absorbant pad and secure with rolled gauze; avoid tape directly on your skin; Call the clinic as soon as possible to let us know what the current issues are in receiving wound care [phone].      SEEK MEDICAL CARE IF:  You have an increase in swelling, pain, or redness around the wound.  You have an increase in the amount of pus coming from the wound.  There is a bad smell coming from the wound.  The wound appears to be worsening/enlarging  You have a " fever greater than 101.5 F      It is ok to continue current wound care treatment/products for the next 2-3 days until new wound care supplies are ordered and arrive. If longer than this please contact our office at [phone].    If you have a 2 layer or 4 layer compression wrap on these are safe to have on for ONLY 7 days. If for some reason you are not able to get the wrap(s) changed (due to illness; lack of supplies, lack of help, lack of transportation) please do the following: unwrap the old 2 or 4 layer compression wrap; avoid using scissors as you could cut your skin and cause wounds; use tubular compression when available. Call to reschedule your home care or clinic visit appointment as soon as possible.    Please NOTE: if you are 15 minutes late to your clinic appointment you will have to be rescheduled. Please call our clinic as soon as possible if you know you will not be able to get to your appointment at [phone].    If you fail to show up to 3 scheduled clinic appointments you will be dismissed from our clinic.        It is recommended that you do not get your ulcer wet when showering.  Listed below are several ways of keeping it dry when you shower.     1. Wrap it with Press and Seal plastic wrap.  It can be found in the stores where the plastic wraps or tin foil is kept.               2.  Some people take a bath and hang their leg/foot out of the tub.                        3  Put your leg in a plastic bag and tape it on.           4. You can purchase a shower cover for casts at some pharmacies and through the Internet.            5. Take a Bed Bath or wash up at the sink            We want to hear from you!  In the next few weeks, you should receive a call or email to complete a survey about your visit at [facility]. Please help us improve your appointment experience by letting us know how we did today. We strive to make your experience good and value any ways in which we  could do better.      We value your input and suggestions.    Thank you for choosing the [facility]!

## 2022-11-02 NOTE — PROGRESS NOTES
Follow up Vascular Visit       Date of Service:11/02/22      Chief Complaint: BLE edema and venous stasis ulcers      Pt returns to [facility] with regards to their BLE edema and venous stasis ulcers.  They arrive today alone. They are currently using endoform to the wounds. This is being done by staff at the clinic 1-2 times per week. They are using 2 layer compression wraps for compression. They are feeling well today. Denies fevers, chills. No shortness of breath. Pt has been struggling since the death of his wife which is complicated by his memory issues; he is working with neurology currently for diagnosis. I have reported this patient twice as a vulnerable adult; [facility] and they are going out to evaluate the patient. I have been working with the patient's sister in law and they are aware of the issues with the patient's memory, not coming to appointments; pt not understanding his current disease process with his legs and need for life long compression. We have also alerted the PCP.     Allergies:   Allergies   Allergen Reactions     Cats Itching     Dust Mites Itching     Mold Itching     Levaquin [Levofloxacin]      Ankle swelling       Medications:   Current Outpatient Medications:      ADVAIR -21 MCG/ACT inhaler, INHALE 2 PUFFS BY MOUTH TWICE DAILY, Disp: , Rfl:      albuterol (PROAIR HFA/PROVENTIL HFA/VENTOLIN HFA) 108 (90 Base) MCG/ACT inhaler, Inhale 2 puffs into the lungs as needed for shortness of breath / dyspnea or wheezing, Disp: , Rfl:      ascorbic acid 1000 MG TABS tablet, Take 1,000 mg by mouth, Disp: , Rfl:      aspirin (ASA) 81 MG EC tablet, Take 81 mg by mouth daily, Disp: , Rfl:      BREZTRI AEROSPHERE 160-9-4.8 MCG/ACT AERO, INHALE 2 PUFFS BY MOUTH TWICE DAILY, Disp: , Rfl:      calcium carbonate-vitamin D (OS-JOSUE WITH D) 500-200 MG-UNIT tablet, Take 1 tablet by mouth, Disp: , Rfl:      cholecalciferol (DIALYVITE VITAMIN D  5000) 125 MCG (5000 UT) CAPS, Take 2 capsules (10,000 Units) by mouth daily, Disp: 90 capsule, Rfl: 3     donepezil (ARICEPT) 5 MG tablet, TAKE 1 TABLET(5 MG) BY MOUTH EVERY DAY, Disp: , Rfl:      ferrous sulfate (FEROSUL) 325 (65 Fe) MG tablet, Take 325 mg by mouth daily (with breakfast), Disp: , Rfl:      ketoconazole (NIZORAL A-D) 1 % shampoo, Externally apply topically as needed, Disp: , Rfl:      levothyroxine (SYNTHROID/LEVOTHROID) 100 MCG tablet, Take 100 mcg by mouth daily, Disp: , Rfl:      losartan (COZAAR) 100 MG tablet, Take 100 mg by mouth daily, Disp: , Rfl:      losartan (COZAAR) 25 MG tablet, , Disp: , Rfl:      multivitamin w/minerals (THERA-VIT-M) tablet, Take 1 tablet by mouth, Disp: , Rfl:      vitamin C (ASCORBIC ACID) 1000 MG TABS, Take 1 tablet (1,000 mg) by mouth 2 times daily, Disp: 180 tablet, Rfl: 3    Current Facility-Administered Medications:      lidocaine (XYLOCAINE) 2 % external gel, , Topical, Daily PRN, Deborah Green NP    History:   Past Medical History:   Diagnosis Date     Asthma      COPD (chronic obstructive pulmonary disease) (H)      Essential thrombocytopenia (H)      Graves disease      Hypertension      Hypothyroidism      Lumbar disc herniation      Testosterone deficiency        Physical Exam:    BP (!) 164/84   Pulse 70   Temp 98.2  F (36.8  C)   Resp 16   Wt 157 lb 4.8 oz (71.4 kg)   BMI 22.57 kg/m      General:  Patient presents to clinic in no apparent distress.  Head: normocephalic atraumatic  Psychiatric:  Alert and oriented x3.   Respiratory: unlabored breathing; no cough  Integumentary:  Skin is uniformly warm, dry and pink.    Wound #1 Location: left lateral leg  Size: 3.4L x 0.9W x 0.2depth.  No sinus tract present, Wound base: slough; eshar  No undermining present. Wound is full thickness. There is moderate drainage. Periwound: no denudement, erythema, induration, maceration or warmth.      Wound #2 Location: left shin  Size: 0.5x0.5x0.1depth.  No sinus  tract present, Wound base: slough; eshar  No undermining present. Wound is full thickness. There is moderate drainage. Periwound: no denudement, erythema, induration, maceration or warmth.      Swelling stable    Right lateral leg healed    Wound (used by [facility] only) 03/03/21 1237 Right (Active)   Number of days: 609       VASC Wound Lt lateral ankle (Active)   Pre Size Length 0 09/15/22 0900   Pre Size Width 0 09/15/22 0900   Pre Size Depth 0 09/15/22 0900   Pre Total Sq cm 0 09/15/22 0900   Product Used Alginate 08/30/22 1000   Number of days: 93       VASC Wound Lt lateral leg (Active)   Pre Size Length 3.4 11/02/22 0900   Pre Size Width 0.9 11/02/22 0900   Pre Size Depth 0.1 11/02/22 0900   Pre Total Sq cm 3.06 11/02/22 0900   Post Size Length 7.5 09/07/22 0900   Post Size Width 3 09/07/22 0900   Post Size Depth 0.1 09/07/22 0900   Post Total Sq cm 22.5 09/07/22 0900   Product Used Collagen 10/25/22 1500   Number of days: 93       VASC Wound Lt lower shin (Active)   Pre Size Length 0.5 10/25/22 1500   Pre Size Width 0.5 10/25/22 1500   Pre Size Depth 0.1 10/25/22 1500   Pre Total Sq cm 0.25 10/25/22 1500   Post Size Length 2.5 09/07/22 0900   Post Size Width 1.5 09/07/22 0900   Post Size Depth 0.1 09/07/22 0900   Post Total Sq cm 3.75 09/07/22 0900   Description scab 11/02/22 0900   Product Used Collagen 10/25/22 1500   Number of days: 93       VASC Wound Rt lateral leg (Active)   Pre Size Length 0.3 10/25/22 1500   Pre Size Width 0.3 10/25/22 1500   Pre Size Depth 0.1 10/25/22 1500   Pre Total Sq cm 0.09 10/25/22 1500   Post Size Length 2.4 09/07/22 0900   Post Size Width 0.3 09/07/22 0900   Post Size Depth 0.1 09/07/22 0900   Post Total Sq cm 0.72 09/07/22 0900   Description scab 11/02/22 0900   Product Used Collagen 10/25/22 1500   Number of days: 93       Incision/Surgical Site 11/04/20 Left Lower Eyelid (Active)   Number of days: 728       Incision/Surgical Site 07/09/22 Abdomen (Active)   Number of days:  116       Incision/Surgical Site 07/09/22 Bilateral Leg (Active)   Number of days: 116            Circumferential volume measures:      Circumferential Measures 9/22/2022 10/4/2022 10/11/2022 10/18/2022 10/25/2022   Right just above MTP 23.2 23.8 24.1 23 23   Right Ankle 23.6 24.8 25.3 24.5 24   Right Widest Calf 31 36 35.4 35 36   Left - just above MTP 23.6 24.9 23.8 23 23   Left Ankle 25.4 24.4 23.3 24 24   Left Widest Calf 30.6 31.4 31.6 34.5 38       Labs:    I personally reviewed the following lab results today and those on care everywhere    CRP   Date Value Ref Range Status   07/08/2022 5.5 (H) 0.0 - <0.8 mg/dL Final      No results found for: SED   Last Renal Panel:  Sodium   Date Value Ref Range Status   07/10/2022 143 136 - 145 mmol/L Final     Potassium   Date Value Ref Range Status   07/10/2022 4.3 3.5 - 5.0 mmol/L Final     Chloride   Date Value Ref Range Status   07/10/2022 110 (H) 98 - 107 mmol/L Final     Carbon Dioxide (CO2)   Date Value Ref Range Status   07/10/2022 24 22 - 31 mmol/L Final     Anion Gap   Date Value Ref Range Status   07/10/2022 9 5 - 18 mmol/L Final     Glucose   Date Value Ref Range Status   07/10/2022 93 70 - 125 mg/dL Final     GLUCOSE BY METER POCT   Date Value Ref Range Status   07/10/2022 79 70 - 99 mg/dL Final     Urea Nitrogen   Date Value Ref Range Status   07/10/2022 45 (H) 8 - 28 mg/dL Final     Creatinine   Date Value Ref Range Status   07/10/2022 1.81 (H) 0.70 - 1.30 mg/dL Final     GFR Estimate   Date Value Ref Range Status   07/10/2022 37 (L) >60 mL/min/1.73m2 Final     Comment:     Effective December 21, 2021 eGFRcr in adults is calculated using the 2021 CKD-EPI creatinine equation which includes age and gender (Michell palomo al., NEJ, DOI: 10.1056/CVMNki1382500)     Calcium   Date Value Ref Range Status   07/10/2022 7.9 (L) 8.5 - 10.5 mg/dL Final     Phosphorus   Date Value Ref Range Status   07/08/2022 5.6 (H) 2.5 - 4.5 mg/dL Final     Albumin   Date Value Ref Range  Status   07/09/2022 3.0 (L) 3.5 - 5.0 g/dL Final      Lab Results   Component Value Date    WBC 10.9 07/10/2022     Lab Results   Component Value Date    RBC 3.25 07/10/2022     Lab Results   Component Value Date    HGB 10.2 07/10/2022     Lab Results   Component Value Date    HCT 33.6 07/10/2022     No components found for: MCT  Lab Results   Component Value Date     07/10/2022     Lab Results   Component Value Date    MCH 31.4 07/10/2022     Lab Results   Component Value Date    MCHC 30.4 07/10/2022     Lab Results   Component Value Date    RDW 13.6 07/10/2022     Lab Results   Component Value Date     07/10/2022      No results found for: A1C   TSH   Date Value Ref Range Status   07/08/2022 0.49 0.30 - 5.00 uIU/mL Final      No results found for: VITDT                Impression:  Encounter Diagnoses   Name Primary?     Venous stasis ulcer of left lower leg with edema of left lower leg (H) Yes     Venous stasis ulcer of right lower leg with edema of right lower leg (H)      Venous (peripheral) insufficiency      Edema of lower extremity      Memory impairment           11/2/2022 right leg         11/2/2022 left shin      11/2/2022 left lateral leg            Are any of these wounds new today: No; Location: na    Assessment/Plan:          1. Debridement: After discussion of risk factors and verbal consent was obtained 2% Lidocaine HCL jelly was applied, under clean conditions, the LLE ulceration(s) were debrided using currette. Devitalized and nonviable tissue, along with any fibrin and slough, was removed to improve granulation tissue formation, stimulate wound healing, decrease overall bacteria load, disrupt biofilm formation and decrease edge senescence.  Total excisional debridement was 3.31 sq cm from the epidermis/dermis area and into the subcutaneous tissue with a depth of 0.2 cm.   Ulcers were improved afterwards and .  Measures were unchanged after debridement.       2.  Wound  "treatment: wound treatment will include irrigation and dressings to promote autolytic debridement which will include:wounds are worse on the left leg; appears that the pt maybe self traumatizing the area with rubbing or scratching over the wrap; will go back to hydrofera blue ready; change weekly at the clinic; pt refused home care. If for some reason the patient is not able to get their dressing(s) changed as outlined above (due to illness, lack of supplies, lack of help) please do the following: remove old, soiled dressings; wash the wounds with saline; pat dry; apply ABD pad or other absorbant pad and secure with rolled gauze; avoid tape directly on your skin; patient instructed to call the clinic as soon as possible to let us know what the current issues are in receiving wound care. Stable to worse           3. Edema: educated the patient 5 times on how the venous system works; edema and ulcers; he still states after the fifth time \"I just don't understand\" he does not want to wear compression on the legs. I reminded the patient that we healed the right leg once before and he did not wear his compression and it reopened; he has no recollection of this. Today we will apply double tubular compression on the right leg and write in permanent marker \"do NOT remove\" and see if this visual reminder helps. For the left leg we will apply 2 layer again. The compression wraps were applied today in clinic.     If a 2 layer or 4 layer compression wrap is being used; these are safe to have on for ONLY 7 days. If for some reason the patient is not able to get the wrap(s) changed (due to illness; lack of supplies, lack of help, lack of transportation) please do the following: unwrap the old 2 or 4 layer compression wrap; avoid using scissors as you could cut your skin and cause wounds; use tubular compression when available. Call to reschedule your home care or clinic visit appointment as soon as possible.  Stable            4. " Nutrition: focus on protein; weight is stable now.            5. Offloading: na           6. Vulnerable adult: I have reported him to the county again; I received a call from them last week; they will be going out again to assess the pt; I have been in contact with the sister in law as well and they are aware of the situation; he is on a waiting list for assisted living.      Patient will follow up with me in 4 weeks for reevaluation; NV weekly until then. They were instructed to call the clinic sooner with any signs or symptoms of infection or any further questions/concerns. Answered all questions.          Deborah Green DNP, RN, CNP, CWOCN, CFCN, CLT  [facility]   [phone]        This note was electronically signed by Deborah Green NP

## 2022-11-02 NOTE — NURSING NOTE
Compression Applied to Left  2-Layer Coban: I Applied the inner foam layer with the foot dorsiflexed and started atthe base of the fifth metatarsal head. I left the bottom of the heel exposed, and proceed by winding the foam up the leg using minimal overlap to just below the fibular head. I then applied the compression layer with the foot dorsiflexed and startingat the base of the fifth metatarsal head. I applied at full stretch and proceeded up the leg using 50% overlap. The bottom of the heel is covered with the compression layer up to the end at the fibular head just below the back of the knee and levelwith the top edge of the foam layer.  I gently pressed and conformed the entire surface of the system to ensurethat the two layers are firmly bound together     Compression Applied to Right  Tubigrip Size F, doubled

## 2022-11-09 ENCOUNTER — OFFICE VISIT (OUTPATIENT)
Dept: VASCULAR SURGERY | Facility: CLINIC | Age: 82
End: 2022-11-09
Attending: NURSE PRACTITIONER
Payer: MEDICARE

## 2022-11-09 VITALS
TEMPERATURE: 97.8 F | RESPIRATION RATE: 18 BRPM | SYSTOLIC BLOOD PRESSURE: 142 MMHG | DIASTOLIC BLOOD PRESSURE: 78 MMHG | HEART RATE: 72 BPM

## 2022-11-09 DIAGNOSIS — I83.029 VENOUS STASIS ULCER OF LEFT LOWER LEG WITH EDEMA OF LEFT LOWER LEG (H): Primary | ICD-10-CM

## 2022-11-09 DIAGNOSIS — L97.929 VENOUS STASIS ULCER OF LEFT LOWER LEG WITH EDEMA OF LEFT LOWER LEG (H): Primary | ICD-10-CM

## 2022-11-09 DIAGNOSIS — I83.891 VENOUS STASIS ULCER OF RIGHT LOWER LEG WITH EDEMA OF RIGHT LOWER LEG (H): ICD-10-CM

## 2022-11-09 DIAGNOSIS — R60.0 EDEMA OF LOWER EXTREMITY: ICD-10-CM

## 2022-11-09 DIAGNOSIS — I83.019 VENOUS STASIS ULCER OF RIGHT LOWER LEG WITH EDEMA OF RIGHT LOWER LEG (H): ICD-10-CM

## 2022-11-09 DIAGNOSIS — L97.919 VENOUS STASIS ULCER OF RIGHT LOWER LEG WITH EDEMA OF RIGHT LOWER LEG (H): ICD-10-CM

## 2022-11-09 DIAGNOSIS — R60.0 VENOUS STASIS ULCER OF LEFT LOWER LEG WITH EDEMA OF LEFT LOWER LEG (H): Primary | ICD-10-CM

## 2022-11-09 DIAGNOSIS — I87.2 VENOUS (PERIPHERAL) INSUFFICIENCY: ICD-10-CM

## 2022-11-09 DIAGNOSIS — R60.0 VENOUS STASIS ULCER OF RIGHT LOWER LEG WITH EDEMA OF RIGHT LOWER LEG (H): ICD-10-CM

## 2022-11-09 DIAGNOSIS — I83.892 VENOUS STASIS ULCER OF LEFT LOWER LEG WITH EDEMA OF LEFT LOWER LEG (H): Primary | ICD-10-CM

## 2022-11-09 PROCEDURE — 97602 WOUND(S) CARE NON-SELECTIVE: CPT

## 2022-11-09 ASSESSMENT — PAIN SCALES - GENERAL: PAINLEVEL: NO PAIN (0)

## 2022-11-09 NOTE — PROGRESS NOTES
"[facility]  -  Nurse Visit        Date of Service:  November 9, 2022     Requesting Provider: TOI Green    Diagnosis:     ICD-10-CM    1. Venous stasis ulcer of left lower leg with edema of left lower leg (H)  I83.029     I83.892     L97.929     R60.9       2. Venous stasis ulcer of right lower leg with edema of right lower leg (H)  I83.019     I83.891     L97.919     R60.9       3. Venous (peripheral) insufficiency  I87.2       4. Edema of lower extremity  R60.0           Chief Complaint: Ramu is being seen today at [facility] [place] for his bilateral lower leg wound(s) and bilateral swelling dressing change. Ramu arrives to the clinic today alone with appropriate supplies. Reports pain of 0.  Denies any fevers, chills, or generalized ill feeling.     Dressing on Arrival: 2-layer left and double tubular compression on right, Pt is currently using 2-layer on left and tubular compression size F (double layer) on right for compression and did tolerate well. Patient today is very argumentative about reapplying compression stating that, \"I don't need this crap anymore and if I would leave it open to the air it would all heal.\" Spoke with Neri Garcia RN who is familiar with Ramu and she stated this is typical for him. Discussed with Ramu the reason for compression and wound healing. He eventually did agree to have dressings reapplied. Upon removal of dressings light serosanguinous drainage is noted. Patient also has velcro compression with him today and he states he was told to bring it in and he would be provided a refund. Will check with Deborah Green CNP regarding this. He refused to take the velcro with him today so it was labeled and placed in the clinic drawer. Scheduled Ramu for a follow up in one week and wrote appointment on his personal calendar.        Right lateral leg 11/9        Left lateral leg 11/9        Left shin 11/9        Bilateral legs " 11/9        New Wounds noted: No    Vital Signs: BP (!) 142/78   Pulse 72   Temp 97.8  F (36.6  C) (Temporal)   Resp 18     Assessment:      General:  Patient presents to clinic in no apparent distress.   Psychiatric:  Alert and oriented x3.   Lower extremity:  edema is present.    Integumentary:  Skin is WNL    Circumferential volume measures:  Circumferential Measures 10/4/2022 10/11/2022 10/18/2022 10/25/2022 11/9/2022   Right just above MTP 23.8 24.1 23 23 23   Right Ankle 24.8 25.3 24.5 24 24.2   Right Widest Calf 36 35.4 35 36 35.2   Left - just above MTP 24.9 23.8 23 23 23   Left Ankle 24.4 23.3 24 24 24.2   Left Widest Calf 31.4 31.6 34.5 38 37.6       Wound info:  Wound (used by [facility] only) 03/03/21 1237 Right (Active)       VASC Wound Lt lateral ankle (Active)   Pre Size Length 0 09/15/22 0900   Pre Size Width 0 09/15/22 0900   Pre Size Depth 0 09/15/22 0900   Pre Total Sq cm 0 09/15/22 0900   Product Used Alginate 08/30/22 1000       VASC Wound Lt lateral leg (Active)   Pre Size Length 3.6 11/09/22 1500   Pre Size Width 1 11/09/22 1500   Pre Size Depth 0.2 11/09/22 1500   Pre Total Sq cm 3.6 11/09/22 1500   Post Size Length 7.5 09/07/22 0900   Post Size Width 3 09/07/22 0900   Post Size Depth 0.1 09/07/22 0900   Post Total Sq cm 22.5 09/07/22 0900   Product Used Collagen 10/25/22 1500       VASC Wound Lt lower shin (Active)   Pre Size Length 0.5 11/09/22 1500   Pre Size Width 0.5 11/09/22 1500   Pre Size Depth 0.1 11/09/22 1500   Pre Total Sq cm 0.25 11/09/22 1500   Post Size Length 0.5 11/02/22 0900   Post Size Width 0.5 11/02/22 0900   Post Size Depth 0.1 11/02/22 0900   Post Total Sq cm 0.25 11/02/22 0900   Description scab 11/02/22 0900   Product Used Collagen 10/25/22 1500       VASC Wound Rt lateral leg (Active)   Pre Size Length 0.3 10/25/22 1500   Pre Size Width 0.3 10/25/22 1500   Pre Size Depth 0.1 10/25/22 1500   Pre Total Sq cm 0.09 10/25/22 1500   Post Size Length 2.4 09/07/22 0900   Post  Size Width 0.3 22 0900   Post Size Depth 0.1 22 0900   Post Total Sq cm 0.72 22 0900   Description dry scab 22 1500   Product Used Collagen 10/25/22 1500       Incision/Surgical Site 20 Left Lower Eyelid (Active)       Incision/Surgical Site 22 Abdomen (Active)       Incision/Surgical Site 22 Bilateral Leg (Active)       Undermining is not present.    The periwoundskin is pink      Plan:         1. Patient will return in 1 weeks for nurse visit.            2. As listed below, treatment provided irrigation, mechanical cleansing, and dressings to promote autolytic debridement and included application of multi-layer compression therapy.             Cleansed with: Dilute hibiclens 30cc in 500cc NS    Protected skin with: Remedy skin repair lotion    Dressings Applied to wound: Hydrofera blue ready, Gauze, roll gauze    Compression Applied to the right leg: Tubular compression (double layer)  Compression Applied to the left le-Layer Compression    2-Layer Coban: I Applied the inner foam layer with the foot dorsiflexed and started atthe base of the fifth metatarsal head. I left the bottom of the heel exposed, and proceed by winding the foam up the leg using minimal overlap to just below the fibular head. I then applied the compression layer with the foot dorsiflexed and startingat the base of the fifth metatarsal head. I applied at full stretch and proceeded up the leg using 50% overlap. The bottom of the heel is covered with the compression layer up to the end at the fibular head just below the back of the knee and levelwith the top edge of the foam layer.  I gently pressed and conformed the entire surface of the system to ensurethat the two layers are firmly bound together      Offloading used: None    Trial Products: No    Provider notified regarding concerns: No    Treatment Changes: No    Tolerated Dressing Change:  Yes    Taught Regarding: follow up appointment(s), wound  cares, compression, layered compression wraps - maximum wear time of 7 days, elevation, offloading and compliance    Educational Barriers: Cognitive-Memory impairment      Bárbara Saxena RN, CWCN, CFCN

## 2022-11-09 NOTE — PATIENT INSTRUCTIONS
"Please bring extra hydrofera blue and any compression socks or wraps to next appointments.     Elevate the legs for 30 minutes 3-4 times per day        Keep pressure off the wound area     Take daily Multi vitamin      Take 1 premier protein shake per day        Wound Care Instructions     1 TIMES PER WEEK and as needed, Cleanse your bilateral legs and leg wound(s) with Dilute hibiclens 30cc in 500cc NS.     Pat Dry with non-sterile gauze     Apply Lotion to the intact skin surrounding your wound and other dry skin locations. Some good lotions include: Remedy Skin Repair Cream, Sarna, Vanicream or Cetaphil     Primary Dressing: Apply hydrofera blue ready into/onto the wounds     Secondary dressing: Cover with ABD or gauze     Secure with non-sterile roll gauze (4\" x 75\" roll) and tape (1\" roll tape) as needed; avoid adhesive directly on the skin     Compression: 2 layer to the left leg and double tubular compression on the right leg; at nurse visits please ensure the patient has this on; please remove the tubular compression and wash and lotion the leg for the patient; please write on the tubular compression \"do NOT remove\" to help remind the patient not to remove.     It is not ok to get your wound wet in the bath or shower        If for some reason you are not able to get your dressing(s) changed as outlined above (due to illness, lack of supplies, lack of help) please do the following: remove old, soiled dressings; wash the wounds with saline; pat dry; apply ABD pad or other absorbant pad and secure with rolled gauze; avoid tape directly on your skin; Call the clinic as soon as possible to let us know what the current issues are in receiving wound care [phone].        SEEK MEDICAL CARE IF:  You have an increase in swelling, pain, or redness around the wound.  You have an increase in the amount of pus coming from the wound.  There is a bad smell coming from the wound.  The wound appears to be " worsening/enlarging  You have a fever greater than 101.5 F        It is ok to continue current wound care treatment/products for the next 2-3 days until new wound care supplies are ordered and arrive. If longer than this please contact our office at [phone].     If you have a 2 layer or 4 layer compression wrap on these are safe to have on for ONLY 7 days. If for some reason you are not able to get the wrap(s) changed (due to illness; lack of supplies, lack of help, lack of transportation) please do the following: unwrap the old 2 or 4 layer compression wrap; avoid using scissors as you could cut your skin and cause wounds; use tubular compression when available. Call to reschedule your home care or clinic visit appointment as soon as possible.     Please NOTE: if you are 15 minutes late to your clinic appointment you will have to be rescheduled. Please call our clinic as soon as possible if you know you will not be able to get to your appointment at [phone].     If you fail to show up to 3 scheduled clinic appointments you will be dismissed from our clinic.           It is recommended that you do not get your ulcer wet when showering.  Listed below are several ways of keeping it dry when you shower.      1. Wrap it with Press and Seal plastic wrap.  It can be found in the stores where the plastic wraps or tin foil is kept.                  2.  Some people take a bath and hang their leg/foot out of the tub.                             3  Put your leg in a plastic bag and tape it on.           4. You can purchase a shower cover for casts at some pharmacies and through the Internet.                     5. Take a Bed Bath or wash up at the sink              We want to hear from you!  In the next few weeks, you should receive a call or email to complete a survey about your visit at [facility]. Please help us improve your appointment experience by letting us know how we did today. We strive to  make your experience good and value any ways in which we could do better.       We value your input and suggestions.     Thank you for choosing the [facility]!

## 2022-11-16 ENCOUNTER — ALLIED HEALTH/NURSE VISIT (OUTPATIENT)
Dept: VASCULAR SURGERY | Facility: CLINIC | Age: 82
End: 2022-11-16
Attending: NURSE PRACTITIONER
Payer: MEDICARE

## 2022-11-16 VITALS
RESPIRATION RATE: 18 BRPM | TEMPERATURE: 98.2 F | HEART RATE: 74 BPM | SYSTOLIC BLOOD PRESSURE: 144 MMHG | DIASTOLIC BLOOD PRESSURE: 76 MMHG

## 2022-11-16 DIAGNOSIS — L97.919 VENOUS STASIS ULCER OF RIGHT LOWER LEG WITH EDEMA OF RIGHT LOWER LEG (H): ICD-10-CM

## 2022-11-16 DIAGNOSIS — I83.029 VENOUS STASIS ULCER OF LEFT LOWER LEG WITH EDEMA OF LEFT LOWER LEG (H): Primary | ICD-10-CM

## 2022-11-16 DIAGNOSIS — I87.2 VENOUS (PERIPHERAL) INSUFFICIENCY: ICD-10-CM

## 2022-11-16 DIAGNOSIS — I83.892 VENOUS STASIS ULCER OF LEFT LOWER LEG WITH EDEMA OF LEFT LOWER LEG (H): Primary | ICD-10-CM

## 2022-11-16 DIAGNOSIS — R60.0 VENOUS STASIS ULCER OF RIGHT LOWER LEG WITH EDEMA OF RIGHT LOWER LEG (H): ICD-10-CM

## 2022-11-16 DIAGNOSIS — I83.019 VENOUS STASIS ULCER OF RIGHT LOWER LEG WITH EDEMA OF RIGHT LOWER LEG (H): ICD-10-CM

## 2022-11-16 DIAGNOSIS — I83.891 VENOUS STASIS ULCER OF RIGHT LOWER LEG WITH EDEMA OF RIGHT LOWER LEG (H): ICD-10-CM

## 2022-11-16 DIAGNOSIS — R60.0 VENOUS STASIS ULCER OF LEFT LOWER LEG WITH EDEMA OF LEFT LOWER LEG (H): Primary | ICD-10-CM

## 2022-11-16 DIAGNOSIS — L97.929 VENOUS STASIS ULCER OF LEFT LOWER LEG WITH EDEMA OF LEFT LOWER LEG (H): Primary | ICD-10-CM

## 2022-11-16 PROCEDURE — 97602 WOUND(S) CARE NON-SELECTIVE: CPT

## 2022-11-16 ASSESSMENT — PAIN SCALES - GENERAL: PAINLEVEL: NO PAIN (0)

## 2022-11-16 NOTE — PATIENT INSTRUCTIONS
Leave your tubular compression in place on your right leg.     - Please call us if your compression wraps fall more than 1-2 inches below the bend of the knee. Remove the wraps if you experience any shortness of breathe or notice your toes turning blue/purple and then give us a call. Call if they are too painful. Call if they get wet. If it is a weekend and the wraps fall down, are too painful, or get wet take the wraps off and put on another form of compression. Compression such as velcro wraps, compression stockings, short stretch bandages, or tubular compression. Apply one of these until you can be seen in clinic. Please call us if you have any questions [phone].    - Treatment:  Layered Compression Bandaging (2-layer)    What is it?  The layered compression bandaging has a layer of absorbent material that will soak up drainage.     Why we do it.   This is done to treat swelling, wounds, or both.  This will in turn help circulation and healing.    How to care for your bandages.  The wraps need to be kept dry. If  the wraps become wet, remove them and call the clinic to have another wrap applied.    What to expect.  It is common for the wraps to be uncomfortable at the beginning. The first two days are usually the hardest; then they will become more comfortable.       Elevating your legs will help the discomfort. Try to elevate your legs as much as possible.    If rest and elevation does not help your discomfort, call your provider.  If your provider is not available you can remove the wrap and leave a message for further instructions.    - Swelling and Compression Therapy    Swelling in the legs can be caused by many reasons. No matter what the reason, treatment usually includes some type of compression. This may be done with a support sock, dressing, ace wrap, or layered wraps.     It is important to treat the swelling for many reasons. If the swelling is not treated you may develop blisters that can lead  to ulcerations. This is caused when extra fluid goes into tissue causing damage and blocking blood flow to the tissue.     It is important that you wear your compression every day, including days that you will be seen in clinic.     Compression is often the most important part of treating leg wounds. Without controlling the swelling it is often not possible to heal wounds.     Going without compression for even brief periods of time can be damaging to your legs and your health.  Your compression should be put on first thing in the morning. Take the compression off at night only when instructed by your care provider to do so. Sometimes wearing compression 24 hours a day will be recommended.       If you are having difficulty wearing your compression it is important to notify your care provider so that other options may be reviewed.    Thank you for choosing [facility]. Please call us if you have any questions [phone].

## 2022-11-16 NOTE — Clinical Note
Ramu Thacker is looking excellent. Healed on the right leg and significant improvement in wound on left leg over this last week. As you are aware he is very resistive with wearing his compression especially on the right leg. Stated today he may come back in without his tubular compression on his right leg. He did agree when I told him the wound is newly healed and we need to keep the swelling down and allow the tissue to get stronger. Do you know if he has any compression stockings? Today he said no. I think our days are numbered in how long he will agree to wear compression on the right leg. Should we get him fitted for compression stockings?  Thanks, Ilene

## 2022-11-16 NOTE — PROGRESS NOTES
"[facility]  -  Nurse Visit        Date of Service:  November 16, 2022     Requesting Provider: TOI Green    Diagnosis:     ICD-10-CM    1. Venous stasis ulcer of left lower leg with edema of left lower leg (H)  I83.029     I83.892     L97.929     R60.9       2. Venous stasis ulcer of right lower leg with edema of right lower leg (H)  I83.019     I83.891     L97.919     R60.9       3. Venous (peripheral) insufficiency  I87.2           Chief Complaint: Ramu is being seen today at [facility] for his bilateral leg wound(s) and swelling dressing change. Reports pain of 0, but does report some itching. Denies any fevers, chills, or generalized ill feeling.     Dressing on Arrival: 2-layer left and tubular compression right. Both in place upon arrival. Pt is currently using 2-layer for compression on left and double tubular for compression on right and did tolerate well. Upon removal of dressings scant Serosanguinous drainage is noted to left leg wound. Patient is resistive at each visit about wearing anything on right leg as he feels he is \"healed enough\". States he may come back without the tubular compression next time. Discussed with Ramu that it is important that we keep his swelling down so the newly healed tissue has time to become stronger. Ramu was agreeable to applying tubular compression today. Will update Deborah Green CNP.        Left leg wound 11/16        Right leg wound 11/16            New Wounds noted: No    Vital Signs: BP (!) 144/76 (BP Location: Left arm)   Pulse 74   Temp 98.2  F (36.8  C) (Temporal)   Resp 18     Assessment:      General:  Patient presents to clinic in no apparent distress.   Psychiatric:  Alert and oriented x3.   Lower extremity:  edema is present.    Integumentary:  Skin is hemosiderin, dry-lotion applied    Circumferential volume measures:  Circumferential Measures 10/11/2022 10/18/2022 10/25/2022 11/9/2022 11/16/2022 "   Right just above MTP 24.1 23 23 23 24.4   Right Ankle 25.3 24.5 24 24.2 23.0   Right Widest Calf 35.4 35 36 35.2 34.6   Left - just above MTP 23.8 23 23 23 23.6   Left Ankle 23.3 24 24 24.2 22.4   Left Widest Calf 31.6 34.5 38 37.6 33.6       Wound info:  Wound (used by OP [facility] only) 03/03/21 1237 Right (Active)       VASC Wound Lt lateral ankle (Active)   Pre Size Length 0 09/15/22 0900   Pre Size Width 0 09/15/22 0900   Pre Size Depth 0 09/15/22 0900   Pre Total Sq cm 0 09/15/22 0900   Product Used Alginate 08/30/22 1000       VASC Wound Lt lateral leg (Active)   Pre Size Length 1.4 11/16/22 1100   Pre Size Width 0.8 11/16/22 1100   Pre Size Depth 0.1 11/16/22 1100   Pre Total Sq cm 1.12 11/16/22 1100   Post Size Length 7.5 09/07/22 0900   Post Size Width 3 09/07/22 0900   Post Size Depth 0.1 09/07/22 0900   Post Total Sq cm 22.5 09/07/22 0900   Product Used Collagen 10/25/22 1500       VASC Wound Lt lower shin (Active)   Pre Size Length 0 11/16/22 1100   Pre Size Width 0 11/16/22 1100   Pre Size Depth 0 11/16/22 1100   Pre Total Sq cm 0.25 11/09/22 1500   Post Size Length 0.5 11/02/22 0900   Post Size Width 0.5 11/02/22 0900   Post Size Depth 0.1 11/02/22 0900   Post Total Sq cm 0.25 11/02/22 0900   Description healed 11/16/22 1100   Product Used Collagen 10/25/22 1500       VASC Wound Rt lateral leg (Active)   Pre Size Length 0.3 10/25/22 1500   Pre Size Width 0.3 10/25/22 1500   Pre Size Depth 0.1 10/25/22 1500   Pre Total Sq cm 0.09 10/25/22 1500   Post Size Length 2.4 09/07/22 0900   Post Size Width 0.3 09/07/22 0900   Post Size Depth 0.1 09/07/22 0900   Post Total Sq cm 0.72 09/07/22 0900   Description dry scab removed 11/16/22 1100   Product Used Collagen 10/25/22 1500       Incision/Surgical Site 11/04/20 Left Lower Eyelid (Active)       Incision/Surgical Site 07/09/22 Abdomen (Active)       Incision/Surgical Site 07/09/22 Bilateral Leg (Active)       Undermining is not present.    The periwoundskin is  WNL      Plan:         1. Patient will return in 1 weeks for nurse visit.            2. As listed below, treatment provided irrigation, mechanical cleansing, and dressings to promote autolytic debridement and included application of multi-layer compression therapy.             Cleansed with: Dilute hibiclens 30cc in 500cc NS    Protected skin with: Remedy skin repair lotion    Dressings Applied to wound: Hydrofera blue ready transfer    Compression Applied to the right leg: Tubular compression  Compression Applied to the left le-Layer Compression    2-Layer Coban: I Applied the inner foam layer with the foot dorsiflexed and started atthe base of the fifth metatarsal head. I left the bottom of the heel exposed, and proceed by winding the foam up the leg using minimal overlap to just below the fibular head. I then applied the compression layer with the foot dorsiflexed and startingat the base of the fifth metatarsal head. I applied at full stretch and proceeded up the leg using 50% overlap. The bottom of the heel is covered with the compression layer up to the end at the fibular head just below the back of the knee and levelwith the top edge of the foam layer.  I gently pressed and conformed the entire surface of the system to ensurethat the two layers are firmly bound together      Offloading used: None    Trial Products: No    Provider notified regarding concerns: Yes: Updated Deborah Green CNP-wound on right leg healed. Patient is resistive in wearing tubular compression and states he does not have compression socks.    Treatment Changes: No    Tolerated Dressing Change:  Yes    Taught Regarding: follow up appointment(s), wound cares, layered compression wraps - maximum wear time of 7 days, elevation, offloading and compliance    Educational Barriers: Cognitive-memory impairment      Bárbara Saxena, RN, WTA-C, [facility]

## 2022-11-23 ENCOUNTER — ALLIED HEALTH/NURSE VISIT (OUTPATIENT)
Dept: VASCULAR SURGERY | Facility: CLINIC | Age: 82
End: 2022-11-23
Attending: NURSE PRACTITIONER
Payer: MEDICARE

## 2022-11-23 VITALS
HEART RATE: 84 BPM | DIASTOLIC BLOOD PRESSURE: 80 MMHG | TEMPERATURE: 97.7 F | RESPIRATION RATE: 16 BRPM | SYSTOLIC BLOOD PRESSURE: 148 MMHG

## 2022-11-23 DIAGNOSIS — R60.0 VENOUS STASIS ULCER OF LEFT LOWER LEG WITH EDEMA OF LEFT LOWER LEG (H): Primary | ICD-10-CM

## 2022-11-23 DIAGNOSIS — I83.029 VENOUS STASIS ULCER OF LEFT LOWER LEG WITH EDEMA OF LEFT LOWER LEG (H): Primary | ICD-10-CM

## 2022-11-23 DIAGNOSIS — L97.929 VENOUS STASIS ULCER OF LEFT LOWER LEG WITH EDEMA OF LEFT LOWER LEG (H): Primary | ICD-10-CM

## 2022-11-23 DIAGNOSIS — I83.892 VENOUS STASIS ULCER OF LEFT LOWER LEG WITH EDEMA OF LEFT LOWER LEG (H): Primary | ICD-10-CM

## 2022-11-23 PROCEDURE — 97602 WOUND(S) CARE NON-SELECTIVE: CPT

## 2022-11-23 ASSESSMENT — PAIN SCALES - GENERAL: PAINLEVEL: NO PAIN (0)

## 2022-11-23 NOTE — PATIENT INSTRUCTIONS
- Please call us if your compression wraps fall more than 1-2 inches below the bend of the knee. Remove the wraps if you experience any shortness of breath or notice your toes turning blue/purple and then give us a call. Call if they are too painful. Call if they get wet. If it is a weekend and the wraps fall down, are too painful, or get wet take the wraps off and put on another form of compression. Compression such as velcro wraps, compression stockings, short stretch bandages, or tubular compression. Apply one of these until you can be seen in clinic. The layered compression wraps are safe to have on for ONLY 7 days. If for some reason you are not able to get the wrap(s) changed (due to illness; lack of supplies, lack of help, lack of transportation) please do the following: unwrap the old 2 or 4 layer compression wrap; avoid using scissors as you could cut your skin and cause wounds; use tubular compression when available. Call to reschedule your home care or clinic visit appointment as soon as possible.     - Treatment:  Layered Compression Bandaging (2-layer)    What is it?  The layered compression bandaging has a layer of absorbent material that will soak up drainage.     Why we do it.   This is done to treat swelling, wounds, or both.  This will in turn help circulation and healing.    How to care for your bandages.  The wraps need to be kept dry. If  the wraps become wet, remove them and call the clinic to have another wrap applied.    What to expect.  It is common for the wraps to be uncomfortable at the beginning. The first two days are usually the hardest; then they will become more comfortable.       Elevating your legs will help the discomfort. Try to elevate your legs as much as possible.    If rest and elevation does not help your discomfort, call your provider.  If your provider is not available you can remove the wrap and leave a message for further instructions.    - Swelling and Compression  Therapy    Swelling in the legs can be caused by many reasons. No matter what the reason, treatment usually includes some type of compression. This may be done with a support sock, dressing, ace wrap, or layered wraps.     It is important to treat the swelling for many reasons. If the swelling is not treated you may develop blisters that can lead to ulcerations. This is caused when extra fluid goes into tissue causing damage and blocking blood flow to the tissue.     It is important that you wear your compression every day, including days that you will be seen in clinic.     Compression is often the most important part of treating leg wounds. Without controlling the swelling it is often not possible to heal wounds.     Going without compression for even brief periods of time can be damaging to your legs and your health.  Your compression should be put on first thing in the morning. Take the compression off at night only when instructed by your care provider to do so. Sometimes wearing compression 24 hours a day will be recommended.       If you are having difficulty wearing your compression it is important to notify your care provider so that other options may be reviewed.    Thank you for choosing  [facility]. Please call us if you have any questions [phone].

## 2022-11-23 NOTE — PROGRESS NOTES
[facility]  -  Nurse Visit    Bilateral legs        New wound on left shin        Left lateral ankle wound        Date of Service:  November 23, 2022     Requesting Provider: TOI Green    Diagnosis:     ICD-10-CM    1. Venous stasis ulcer of left lower leg with edema of left lower leg (H)  I83.029     I83.892     L97.929     R60.9           Chief Complaint: Ramu is being seen today at [facility] for his wound(s) and swelling dressing change. Reports pain of 0, but complains of itching and said he was scratching the left leg frequently.  Denies any fevers, chills, or generalized ill feeling.     Dressing on Arrival: Intact, Pt is currently using 2 layer for compression on left leg and double layer tubular on the left and did not tolerate well due to the itching. Upon removal of dressings moderate Serosanguinous drainage is noted.    New Wounds noted: Yes: New wound on left shin where patient was scratching. Notified Deborah Green. Hydrofera blue applied.   Patient did not want tubular compression reapplied to right leg but allowed a single layer to be applied.     Vital Signs: BP (!) 148/80   Pulse 84   Temp 97.7  F (36.5  C)   Resp 16     Assessment:      General:  Patient presents to clinic in no apparent distress.   Psychiatric:  Alert and oriented x3.   Lower extremity:  edema is present.    Integumentary:  Skin is pink    Circumferential volume measures:  Circumferential Measures 10/11/2022 10/18/2022 10/25/2022 11/9/2022 11/16/2022   Right just above MTP 24.1 23 23 23 24.4   Right Ankle 25.3 24.5 24 24.2 23.0   Right Widest Calf 35.4 35 36 35.2 34.6   Left - just above MTP 23.8 23 23 23 23.6   Left Ankle 23.3 24 24 24.2 22.4   Left Widest Calf 31.6 34.5 38 37.6 33.6       Wound info:  Wound (used by OP [facility] only) 03/03/21 1237 Right (Active)       VASC Wound Lt lateral ankle (Active)   Pre Size Length 0 09/15/22 0900   Pre Size Width 0 09/15/22 0900    Pre Size Depth 0 09/15/22 0900   Pre Total Sq cm 0 09/15/22 0900   Product Used Alginate 08/30/22 1000       VASC Wound Lt lateral leg (Active)   Pre Size Length 2 11/23/22 0800   Pre Size Width 0.8 11/23/22 0800   Pre Size Depth 0.1 11/23/22 0800   Pre Total Sq cm 1.6 11/23/22 0800   Post Size Length 7.5 09/07/22 0900   Post Size Width 3 09/07/22 0900   Post Size Depth 0.1 09/07/22 0900   Post Total Sq cm 22.5 09/07/22 0900   Product Used Collagen 10/25/22 1500       VASC Wound Lt lower shin (Active)   Pre Size Length 3 11/23/22 0800   Pre Size Width 3 11/23/22 0800   Pre Size Depth 0.1 11/23/22 0800   Pre Total Sq cm 9 11/23/22 0800   Post Size Length 0.5 11/02/22 0900   Post Size Width 0.5 11/02/22 0900   Post Size Depth 0.1 11/02/22 0900   Post Total Sq cm 0.25 11/02/22 0900   Description healed 11/16/22 1100   Product Used Collagen 10/25/22 1500       VASC Wound Rt lateral leg (Active)   Pre Size Length 0.3 10/25/22 1500   Pre Size Width 0.3 10/25/22 1500   Pre Size Depth 0.1 10/25/22 1500   Pre Total Sq cm 0.09 10/25/22 1500   Post Size Length 2.4 09/07/22 0900   Post Size Width 0.3 09/07/22 0900   Post Size Depth 0.1 09/07/22 0900   Post Total Sq cm 0.72 09/07/22 0900   Description SCAB 11/23/22 0800   Product Used Collagen 10/25/22 1500       Incision/Surgical Site 11/04/20 Left Lower Eyelid (Active)       Incision/Surgical Site 07/09/22 Abdomen (Active)       Incision/Surgical Site 07/09/22 Bilateral Leg (Active)       Undermining is not present.    The periwoundskin is erythema      Plan:         1. Patient will return in 7 days for nurse visit.            2. As listed below, treatment provided irrigation, mechanical cleansing, and dressings to promote autolytic debridement and included application of multi-layer compression therapy.             Cleansed with: Dilute hibiclens 30cc in 500cc NS    Protected skin with: Remedy skin repair lotion    Dressings Applied to wound: Hydrofera blue with ABD and  roll gauze    Compression Applied to the right leg: Tubular compression  Compression Applied to the left le-Layer Compression    2-Layer Coban: I Applied the inner foam layer with the foot dorsiflexed and started atthe base of the fifth metatarsal head. I left the bottom of the heel exposed, and proceed by winding the foam up the leg using minimal overlap to just below the fibular head. I then applied the compression layer with the foot dorsiflexed and startingat the base of the fifth metatarsal head. I applied at full stretch and proceeded up the leg using 50% overlap. The bottom of the heel is covered with the compression layer up to the end at the fibular head just below the back of the knee and levelwith the top edge of the foam layer.  I gently pressed and conformed the entire surface of the system to ensurethat the two layers are firmly bound together      Offloading used: None    Trial Products: No    Provider notified regarding concerns: Yes: see above    Treatment Changes: No    Tolerated Dressing Change:  Yes    Taught Regarding: follow up appointment(s) and compression    Educational Barriers: No barriers      Deborah Mayen RN

## 2022-11-30 ENCOUNTER — TELEPHONE (OUTPATIENT)
Dept: VASCULAR SURGERY | Facility: CLINIC | Age: 82
End: 2022-11-30

## 2022-11-30 NOTE — TELEPHONE ENCOUNTER
Placed call to sister-in-law Katherin to discuss the missed nurse visit today. Patient currently has a 2-layer wrap on left leg and needs to have it changed this week. The wrap has been on 7 days.    Katherin reports that she thinks Ramu did not want to drive in the snow today. She is requesting we contact him to get rescheduled for his nurse visit. A message was left at [facility] to call clinic to get rescheduled.    Katherin (Edd sister-in-law) would like a phone call to get him scheduled if he does not return our call by the end of the day.

## 2022-12-01 ENCOUNTER — ALLIED HEALTH/NURSE VISIT (OUTPATIENT)
Dept: VASCULAR SURGERY | Facility: CLINIC | Age: 82
End: 2022-12-01
Attending: NURSE PRACTITIONER
Payer: MEDICARE

## 2022-12-01 VITALS — DIASTOLIC BLOOD PRESSURE: 84 MMHG | HEART RATE: 68 BPM | SYSTOLIC BLOOD PRESSURE: 150 MMHG

## 2022-12-01 DIAGNOSIS — R60.0 VENOUS STASIS ULCER OF LEFT LOWER LEG WITH EDEMA OF LEFT LOWER LEG (H): Primary | ICD-10-CM

## 2022-12-01 DIAGNOSIS — I83.029 VENOUS STASIS ULCER OF LEFT LOWER LEG WITH EDEMA OF LEFT LOWER LEG (H): Primary | ICD-10-CM

## 2022-12-01 DIAGNOSIS — L97.929 VENOUS STASIS ULCER OF LEFT LOWER LEG WITH EDEMA OF LEFT LOWER LEG (H): Primary | ICD-10-CM

## 2022-12-01 DIAGNOSIS — I83.892 VENOUS STASIS ULCER OF LEFT LOWER LEG WITH EDEMA OF LEFT LOWER LEG (H): Primary | ICD-10-CM

## 2022-12-01 PROCEDURE — 97602 WOUND(S) CARE NON-SELECTIVE: CPT

## 2022-12-01 PROCEDURE — 29581 APPL MULTLAYER CMPRN SYS LEG: CPT

## 2022-12-01 ASSESSMENT — PAIN SCALES - GENERAL: PAINLEVEL: NO PAIN (0)

## 2022-12-01 NOTE — PATIENT INSTRUCTIONS
- Please call us if your compression wraps fall more than 1-2 inches below the bend of the knee. Remove the wraps if you experience any shortness of breath or notice your toes turning blue/purple and then give us a call. Call if they are too painful. Call if they get wet. If it is a weekend and the wraps fall down, are too painful, or get wet take the wraps off and put on another form of compression. Compression such as velcro wraps, compression stockings, short stretch bandages, or tubular compression. Apply one of these until you can be seen in clinic. The layered compression wraps are safe to have on for ONLY 7 days. If for some reason you are not able to get the wrap(s) changed (due to illness; lack of supplies, lack of help, lack of transportation) please do the following: unwrap the old 2 or 4 layer compression wrap; avoid using scissors as you could cut your skin and cause wounds; use tubular compression when available. Call to reschedule your home care or clinic visit appointment as soon as possible.     - Treatment:  Layered Compression Bandaging (2-layer)    What is it?  The layered compression bandaging has a layer of absorbent material that will soak up drainage.     Why we do it.   This is done to treat swelling, wounds, or both.  This will in turn help circulation and healing.    How to care for your bandages.  The wraps need to be kept dry. If  the wraps become wet, remove them and call the clinic to have another wrap applied.    What to expect.  It is common for the wraps to be uncomfortable at the beginning. The first two days are usually the hardest; then they will become more comfortable.       Elevating your legs will help the discomfort. Try to elevate your legs as much as possible.    If rest and elevation does not help your discomfort, call your provider.  If your provider is not available you can remove the wrap and leave a message for further instructions.    - Swelling and Compression  Therapy    Swelling in the legs can be caused by many reasons. No matter what the reason, treatment usually includes some type of compression. This may be done with a support sock, dressing, ace wrap, or layered wraps.     It is important to treat the swelling for many reasons. If the swelling is not treated you may develop blisters that can lead to ulcerations. This is caused when extra fluid goes into tissue causing damage and blocking blood flow to the tissue.     It is important that you wear your compression every day, including days that you will be seen in clinic.     Compression is often the most important part of treating leg wounds. Without controlling the swelling it is often not possible to heal wounds.     Going without compression for even brief periods of time can be damaging to your legs and your health.  Your compression should be put on first thing in the morning. Take the compression off at night only when instructed by your care provider to do so. Sometimes wearing compression 24 hours a day will be recommended.       If you are having difficulty wearing your compression it is important to notify your care provider so that other options may be reviewed.    Thank you for choosing  [email or web address]. Please call us if you have any questions [phone].

## 2022-12-01 NOTE — NURSING NOTE
[facility]  -  Nurse Visit                Date of Service:  December 1, 2022     Requesting Provider: TOI Hdz    Diagnosis: No diagnosis found.    Chief Complaint: Ramu is being seen today at [facility] [place] for his wound(s) and swelling dressing change. Reports pain of 0.  Denies any fevers, chills, or generalized ill feeling.     Dressing on Arrival: 2 layer, hydrofera blue, abd, gauze, Pt is currently using 2 layer for compression and did tolerate well. Upon removal of dressings moderate Serosanguinous drainage is noted.    New Wounds noted: No    Vital Signs: BP (!) 150/84   Pulse 68     Assessment:      General:  Patient presents to clinic in no apparent distress.   Psychiatric:  Alert and oriented x3.   Lower extremity:  edema is present.    Integumentary:  Skin is pink    Circumferential volume measures:  Circumferential Measures 10/11/2022 10/18/2022 10/25/2022 11/9/2022 11/16/2022   Right just above MTP 24.1 23 23 23 24.4   Right Ankle 25.3 24.5 24 24.2 23.0   Right Widest Calf 35.4 35 36 35.2 34.6   Left - just above MTP 23.8 23 23 23 23.6   Left Ankle 23.3 24 24 24.2 22.4   Left Widest Calf 31.6 34.5 38 37.6 33.6       Wound info:  Wound (used by OP [facility] only) 03/03/21 1237 Right (Active)       VASC Wound Lt lateral ankle (Active)   Pre Size Length 0 09/15/22 0900   Pre Size Width 0 09/15/22 0900   Pre Size Depth 0 09/15/22 0900   Pre Total Sq cm 0 09/15/22 0900   Product Used Alginate 08/30/22 1000       VASC Wound Lt lateral leg (Active)   Pre Size Length 1.5 12/01/22 1000   Pre Size Width 0.5 12/01/22 1000   Pre Size Depth 0.1 12/01/22 1000   Pre Total Sq cm 1.6 11/23/22 0800   Post Size Length 7.5 09/07/22 0900   Post Size Width 3 09/07/22 0900   Post Size Depth 0.1 09/07/22 0900   Post Total Sq cm 22.5 09/07/22 0900   Product Used Collagen 10/25/22 1500       VASC Wound Lt lower shin (Active)   Pre Size Length 1.4 12/01/22 1000   Pre Size Width  1 22 1000   Pre Size Depth 0.1 22 1000   Pre Total Sq cm 9 22 0800   Post Size Length 0.5 22 0900   Post Size Width 0.5 22 0900   Post Size Depth 0.1 22 0900   Post Total Sq cm 0.25 22 0900   Description healed 22 1100   Product Used Collagen 10/25/22 1500       VASC Wound Rt lateral leg (Active)   Pre Size Length 0.3 10/25/22 1500   Pre Size Width 0.3 10/25/22 1500   Pre Size Depth 0.1 10/25/22 1500   Pre Total Sq cm 0.09 10/25/22 1500   Post Size Length 2.4 22 0900   Post Size Width 0.3 22 0900   Post Size Depth 0.1 22 0900   Post Total Sq cm 0.72 22 0900   Description SCAB 22 0800   Product Used Collagen 10/25/22 1500       Incision/Surgical Site 20 Left Lower Eyelid (Active)       Incision/Surgical Site 22 Abdomen (Active)       Incision/Surgical Site 22 Bilateral Leg (Active)       Undermining is not present.    The periwoundskin is pink      Plan:         1. Patient will return in 1 weeks for follow up .            2. As listed below, treatment provided irrigation, mechanical cleansing, and dressings to promote autolytic debridement and included application of multi-layer compression therapy.             Cleansed with: Dilute hibiclens 30cc in 500cc NS    Protected skin with: Remedy skin repair lotion    Dressings Applied to wound: Hydrofera blue ready transfer, Gauze and ABD    Compression Applied to the right leg: None  Compression Applied to the left le-Layer Compression    2-Layer [place]: I Applied the inner foam layer with the foot dorsiflexed and started atthe base of the fifth metatarsal head. I left the bottom of the heel exposed, and proceed by winding the foam up the leg using minimal overlap to just below the fibular head. I then applied the compression layer with the foot dorsiflexed and startingat the base of the fifth metatarsal head. I applied at full stretch and proceeded up the leg using 50%  overlap. The bottom of the heel is covered with the compression layer up to the end at the fibular head just below the back of the knee and levelwith the top edge of the foam layer.  I gently pressed and conformed the entire surface of the system to ensurethat the two layers are firmly bound together      Offloading used: None    Trial Products: No    Provider notified regarding concerns: No    Treatment Changes: No    Tolerated Dressing Change:  Yes    Taught Regarding: follow up appointment(s), compression, layered compression wraps - maximum wear time of 7 days and compliance    Educational Barriers: No barriers      Marita Smith LPN

## 2022-12-07 ENCOUNTER — OFFICE VISIT (OUTPATIENT)
Dept: VASCULAR SURGERY | Facility: CLINIC | Age: 82
End: 2022-12-07
Payer: MEDICARE

## 2022-12-07 VITALS
DIASTOLIC BLOOD PRESSURE: 82 MMHG | BODY MASS INDEX: 24.02 KG/M2 | TEMPERATURE: 97.7 F | WEIGHT: 167.4 LBS | RESPIRATION RATE: 16 BRPM | HEART RATE: 60 BPM | SYSTOLIC BLOOD PRESSURE: 180 MMHG

## 2022-12-07 DIAGNOSIS — R41.3 MEMORY IMPAIRMENT: ICD-10-CM

## 2022-12-07 DIAGNOSIS — I83.019 VENOUS STASIS ULCER OF RIGHT LOWER LEG WITH EDEMA OF RIGHT LOWER LEG (H): ICD-10-CM

## 2022-12-07 DIAGNOSIS — R60.0 EDEMA OF LOWER EXTREMITY: ICD-10-CM

## 2022-12-07 DIAGNOSIS — I83.029 VENOUS STASIS ULCER OF LEFT LOWER LEG WITH EDEMA OF LEFT LOWER LEG (H): Primary | ICD-10-CM

## 2022-12-07 DIAGNOSIS — R60.0 VENOUS STASIS ULCER OF LEFT LOWER LEG WITH EDEMA OF LEFT LOWER LEG (H): Primary | ICD-10-CM

## 2022-12-07 DIAGNOSIS — I83.891 VENOUS STASIS ULCER OF RIGHT LOWER LEG WITH EDEMA OF RIGHT LOWER LEG (H): ICD-10-CM

## 2022-12-07 DIAGNOSIS — L97.919 VENOUS STASIS ULCER OF RIGHT LOWER LEG WITH EDEMA OF RIGHT LOWER LEG (H): ICD-10-CM

## 2022-12-07 DIAGNOSIS — R60.0 VENOUS STASIS ULCER OF RIGHT LOWER LEG WITH EDEMA OF RIGHT LOWER LEG (H): ICD-10-CM

## 2022-12-07 DIAGNOSIS — L97.929 VENOUS STASIS ULCER OF LEFT LOWER LEG WITH EDEMA OF LEFT LOWER LEG (H): Primary | ICD-10-CM

## 2022-12-07 DIAGNOSIS — I83.892 VENOUS STASIS ULCER OF LEFT LOWER LEG WITH EDEMA OF LEFT LOWER LEG (H): Primary | ICD-10-CM

## 2022-12-07 DIAGNOSIS — I87.2 VENOUS (PERIPHERAL) INSUFFICIENCY: ICD-10-CM

## 2022-12-07 PROCEDURE — 11042 DBRDMT SUBQ TIS 1ST 20SQCM/<: CPT | Performed by: NURSE PRACTITIONER

## 2022-12-07 ASSESSMENT — PAIN SCALES - GENERAL: PAINLEVEL: NO PAIN (0)

## 2022-12-07 NOTE — PROGRESS NOTES
Follow up Vascular Visit       Date of Service:12/07/22      Chief Complaint: left leg ulcers      Pt returns to [facility] with regards to their left leg ulcers.  They arrive today alone. They are currently using hydrofera blue ready  to the wounds. This is being done by staff at our clinic weekly. They are using 2 layer on the left leg for compression; he declines to wear compression on the right; we have attempted tubular compression and velcro he will not wear this; becomes very angry when we discuss this. He is working with neurology and pcp about memory issues; family is also aware; waiting to get placed in a facility. I have reported him as a vulnerable adult to the [facility]. They are feeling well today. Denies fevers, chills. No shortness of breath.     Allergies:   Allergies   Allergen Reactions     Cats Itching     Dust Mites Itching     Mold Itching     Levaquin [Levofloxacin]      Ankle swelling       Medications:   Current Outpatient Medications:      ADVAIR -21 MCG/ACT inhaler, INHALE 2 PUFFS BY MOUTH TWICE DAILY, Disp: , Rfl:      albuterol (PROAIR HFA/PROVENTIL HFA/VENTOLIN HFA) 108 (90 Base) MCG/ACT inhaler, Inhale 2 puffs into the lungs as needed for shortness of breath / dyspnea or wheezing, Disp: , Rfl:      ascorbic acid 1000 MG TABS tablet, Take 1,000 mg by mouth, Disp: , Rfl:      aspirin (ASA) 81 MG EC tablet, Take 81 mg by mouth daily, Disp: , Rfl:      BREZTRI AEROSPHERE 160-9-4.8 MCG/ACT AERO, INHALE 2 PUFFS BY MOUTH TWICE DAILY, Disp: , Rfl:      calcium carbonate-vitamin D (OS-OJSUE WITH D) 500-200 MG-UNIT tablet, Take 1 tablet by mouth, Disp: , Rfl:      cholecalciferol (DIALYVITE VITAMIN D 5000) 125 MCG (5000 UT) CAPS, Take 2 capsules (10,000 Units) by mouth daily, Disp: 90 capsule, Rfl: 3     donepezil (ARICEPT) 5 MG tablet, TAKE 1 TABLET(5 MG) BY MOUTH EVERY DAY, Disp: , Rfl:      ferrous sulfate (FEROSUL) 325 (65 Fe) MG tablet, Take 325 mg by mouth  daily (with breakfast), Disp: , Rfl:      ketoconazole (NIZORAL A-D) 1 % shampoo, Externally apply topically as needed, Disp: , Rfl:      levothyroxine (SYNTHROID/LEVOTHROID) 100 MCG tablet, Take 100 mcg by mouth daily, Disp: , Rfl:      losartan (COZAAR) 100 MG tablet, Take 100 mg by mouth daily, Disp: , Rfl:      losartan (COZAAR) 25 MG tablet, , Disp: , Rfl:      multivitamin w/minerals (THERA-VIT-M) tablet, Take 1 tablet by mouth, Disp: , Rfl:      vitamin C (ASCORBIC ACID) 1000 MG TABS, Take 1 tablet (1,000 mg) by mouth 2 times daily, Disp: 180 tablet, Rfl: 3    Current Facility-Administered Medications:      lidocaine (XYLOCAINE) 2 % external gel, , Topical, Daily PRN, Deborah Green NP, 5 mL at 11/02/22 0948    History:   Past Medical History:   Diagnosis Date     Asthma      COPD (chronic obstructive pulmonary disease) (H)      Essential thrombocytopenia (H)      Graves disease      Hypertension      Hypothyroidism      Lumbar disc herniation      Testosterone deficiency        Physical Exam:    Temp 97.7  F (36.5  C)   Resp 16   Wt 167 lb 6.4 oz (75.9 kg)   BMI 24.02 kg/m      General:  Patient presents to clinic in no apparent distress.  Head: normocephalic atraumatic  Psychiatric:  Alert and oriented x3.   Respiratory: unlabored breathing; no cough  Integumentary:  Skin is uniformly warm, dry and pink.    Wound #1 Location: left lateral leg  Size: 0.6L x 0.3W x 0.1depth.  No sinus tract present, Wound base: surrounding thick crust; slough  No undermining present. Wound is full thickness. There is moderate drainage. Periwound: no denudement, erythema, induration, maceration or warmth.      Several indentations and impression on the skin from the wrap being shoved down; several areas of excoriation    Wound (used by [facility] only) 03/03/21 1237 Right (Active)   Number of days: 644       VASC Wound Lt lateral ankle (Active)   Pre Size Length 0 09/15/22 0900   Pre Size Width 0 09/15/22 0900   Pre Size Depth  0 09/15/22 0900   Pre Total Sq cm 0 09/15/22 0900   Product Used Alginate 08/30/22 1000   Number of days: 128       VASC Wound Lt lateral leg (Active)   Pre Size Length 0.6 12/07/22 0900   Pre Size Width 0.3 12/07/22 0900   Pre Size Depth 0.1 12/07/22 0900   Pre Total Sq cm 0.18 12/07/22 0900   Post Size Length 7.5 09/07/22 0900   Post Size Width 3 09/07/22 0900   Post Size Depth 0.1 09/07/22 0900   Post Total Sq cm 22.5 09/07/22 0900   Product Used Collagen 10/25/22 1500   Number of days: 128       VASC Wound Lt lower shin (Active)   Pre Size Length 1.4 12/01/22 1000   Pre Size Width 1 12/01/22 1000   Pre Size Depth 0.1 12/01/22 1000   Pre Total Sq cm 9 11/23/22 0800   Post Size Length 0.5 11/02/22 0900   Post Size Width 0.5 11/02/22 0900   Post Size Depth 0.1 11/02/22 0900   Post Total Sq cm 0.25 11/02/22 0900   Description healed 11/16/22 1100   Product Used Collagen 10/25/22 1500   Number of days: 128       VASC Wound Rt lateral leg (Active)   Pre Size Length 0.3 10/25/22 1500   Pre Size Width 0.3 10/25/22 1500   Pre Size Depth 0.1 10/25/22 1500   Pre Total Sq cm 0.09 10/25/22 1500   Post Size Length 2.4 09/07/22 0900   Post Size Width 0.3 09/07/22 0900   Post Size Depth 0.1 09/07/22 0900   Post Total Sq cm 0.72 09/07/22 0900   Description SCAB 11/23/22 0800   Product Used Collagen 10/25/22 1500   Number of days: 128       Incision/Surgical Site 11/04/20 Left Lower Eyelid (Active)   Number of days: 763       Incision/Surgical Site 07/09/22 Abdomen (Active)   Number of days: 151       Incision/Surgical Site 07/09/22 Bilateral Leg (Active)   Number of days: 151            Circumferential volume measures:      Circumferential Measures 10/11/2022 10/18/2022 10/25/2022 11/9/2022 11/16/2022   Right just above MTP 24.1 23 23 23 24.4   Right Ankle 25.3 24.5 24 24.2 23.0   Right Widest Calf 35.4 35 36 35.2 34.6   Left - just above MTP 23.8 23 23 23 23.6   Left Ankle 23.3 24 24 24.2 22.4   Left Widest Calf 31.6 34.5 38  37.6 33.6       Labs:    I personally reviewed the following lab results today and those on care everywhere    CRP   Date Value Ref Range Status   07/08/2022 5.5 (H) 0.0 - <0.8 mg/dL Final      No results found for: SED   Last Renal Panel:  Sodium   Date Value Ref Range Status   07/10/2022 143 136 - 145 mmol/L Final     Potassium   Date Value Ref Range Status   07/10/2022 4.3 3.5 - 5.0 mmol/L Final     Chloride   Date Value Ref Range Status   07/10/2022 110 (H) 98 - 107 mmol/L Final     Carbon Dioxide (CO2)   Date Value Ref Range Status   07/10/2022 24 22 - 31 mmol/L Final     Anion Gap   Date Value Ref Range Status   07/10/2022 9 5 - 18 mmol/L Final     Glucose   Date Value Ref Range Status   07/10/2022 93 70 - 125 mg/dL Final     GLUCOSE BY METER POCT   Date Value Ref Range Status   07/10/2022 79 70 - 99 mg/dL Final     Urea Nitrogen   Date Value Ref Range Status   07/10/2022 45 (H) 8 - 28 mg/dL Final     Creatinine   Date Value Ref Range Status   07/10/2022 1.81 (H) 0.70 - 1.30 mg/dL Final     GFR Estimate   Date Value Ref Range Status   07/10/2022 37 (L) >60 mL/min/1.73m2 Final     Comment:     Effective December 21, 2021 eGFRcr in adults is calculated using the 2021 CKD-EPI creatinine equation which includes age and gender (Michell et al., [place], DOI: 10.1056/YMXPpx0889698)     Calcium   Date Value Ref Range Status   07/10/2022 7.9 (L) 8.5 - 10.5 mg/dL Final     Phosphorus   Date Value Ref Range Status   07/08/2022 5.6 (H) 2.5 - 4.5 mg/dL Final     Albumin   Date Value Ref Range Status   07/09/2022 3.0 (L) 3.5 - 5.0 g/dL Final      Lab Results   Component Value Date    WBC 10.9 07/10/2022     Lab Results   Component Value Date    RBC 3.25 07/10/2022     Lab Results   Component Value Date    HGB 10.2 07/10/2022     Lab Results   Component Value Date    HCT 33.6 07/10/2022     No components found for: MCT  Lab Results   Component Value Date     07/10/2022     Lab Results   Component Value Date    MCH 31.4  07/10/2022     Lab Results   Component Value Date    MCHC 30.4 07/10/2022     Lab Results   Component Value Date    RDW 13.6 07/10/2022     Lab Results   Component Value Date     07/10/2022      No results found for: A1C   TSH   Date Value Ref Range Status   07/08/2022 0.49 0.30 - 5.00 uIU/mL Final      No results found for: VITDT                Impression:  Encounter Diagnoses   Name Primary?     Venous stasis ulcer of left lower leg with edema of left lower leg (H) Yes     Venous stasis ulcer of right lower leg with edema of right lower leg (H)      Venous (peripheral) insufficiency      Edema of lower extremity      Memory impairment           12/7/2022 left lateral leg                     Are any of these wounds new today: No; Location: na    Assessment/Plan:          1. Debridement: After discussion of risk factors and verbal consent was obtained 2% Lidocaine HCL jelly was applied, under clean conditions, the left lateral leg ulceration(s) were debrided using currette. Devitalized and nonviable tissue, along with any fibrin and slough, was removed to improve granulation tissue formation, stimulate wound healing, decrease overall bacteria load, disrupt biofilm formation and decrease edge senescence.  Total excisional debridement was 0.18 sq cm from the epidermis/dermis area and into the subcutaneous tissue with a depth of 0.1 cm.   Ulcers were improved afterwards and .  Measures were unchanged after debridement.       2.  Wound treatment: wound treatment will include irrigation and dressings to promote autolytic debridement which will include:will continue with dilute hibiclens wash; lotion; hydrofera blue ready; gauze; rolled gauze; will change in 1 week.  If for some reason the patient is not able to get their dressing(s) changed as outlined above (due to illness, lack of supplies, lack of help) please do the following: remove old, soiled dressings; wash the wounds with saline; pat dry; apply ABD  pad or other absorbant pad and secure with rolled gauze; avoid tape directly on your skin; patient instructed to call the clinic as soon as possible to let us know what the current issues are in receiving wound care. Improved            3. Edema: pt is resistant to compression; he is shoving his 2 layer down and scratching the leg; he will not allow us to wrap the leg today; he was accepting of tubular compression; sounds like he will not wear compression once he is healed; this will make wound recurrence very high. The compression wraps were applied today in clinic.     If a 2 layer or 4 layer compression wrap is being used; these are safe to have on for ONLY 7 days. If for some reason the patient is not able to get the wrap(s) changed (due to illness; lack of supplies, lack of help, lack of transportation) please do the following: unwrap the old 2 or 4 layer compression wrap; avoid using scissors as you could cut your skin and cause wounds; use tubular compression when available. Call to reschedule your home care or clinic visit appointment as soon as possible.  Stable            4. Nutrition: low sodium           5. Offloading: na     Patient will follow up with me in 1 week. They were instructed to call the clinic sooner with any signs or symptoms of infection or any further questions/concerns. Answered all questions.          Deborah Green DNP, RN, CNP, CWOCN, CFCN, CLT  [facility]   [phone]        This note was electronically signed by Deborah Green NP

## 2022-12-07 NOTE — PATIENT INSTRUCTIONS
"  Elevate the legs for 30 minutes 3-4 times per day      Keep pressure off the wound area    Take daily Multi vitamin     Take 1 premier protein shake per day      Wound Care Instructions    1 TIMES PER WEEK and as needed, Cleanse your bilateral legs and leg wound(s) with Dilute hibiclens 30cc in 500cc NS.    Pat Dry with non-sterile gauze    Apply Lotion to the intact skin surrounding your wound and other dry skin locations. Some good lotions include: Remedy Skin Repair Cream, Sarna, Vanicream or Cetaphil    Primary Dressing: Apply hydrofera blue ready into/onto the wounds    Secondary dressing: Cover with ABD or gauze    Secure with non-sterile roll gauze (4\" x 75\" roll) and tape (1\" roll tape) as needed; avoid adhesive directly on the skin    Compression: Tubular compression    It is not ok to get your wound wet in the bath or shower      If for some reason you are not able to get your dressing(s) changed as outlined above (due to illness, lack of supplies, lack of help) please do the following: remove old, soiled dressings; wash the wounds with saline; pat dry; apply ABD pad or other absorbant pad and secure with rolled gauze; avoid tape directly on your skin; Call the clinic as soon as possible to let us know what the current issues are in receiving wound care [phone].      SEEK MEDICAL CARE IF:  You have an increase in swelling, pain, or redness around the wound.  You have an increase in the amount of pus coming from the wound.  There is a bad smell coming from the wound.  The wound appears to be worsening/enlarging  You have a fever greater than 101.5 F      It is ok to continue current wound care treatment/products for the next 2-3 days until new wound care supplies are ordered and arrive. If longer than this please contact our office at [phone].    If you have a 2 layer or 4 layer compression wrap on these are safe to have on for ONLY 7 days. If for some reason you are not able to get the wrap(s) " changed (due to illness; lack of supplies, lack of help, lack of transportation) please do the following: unwrap the old 2 or 4 layer compression wrap; avoid using scissors as you could cut your skin and cause wounds; use tubular compression when available. Call to reschedule your home care or clinic visit appointment as soon as possible.    Please NOTE: if you are 15 minutes late to your clinic appointment you will have to be rescheduled. Please call our clinic as soon as possible if you know you will not be able to get to your appointment at [phone].    If you fail to show up to 3 scheduled clinic appointments you will be dismissed from our clinic.        It is recommended that you do not get your ulcer wet when showering.  Listed below are several ways of keeping it dry when you shower.     1. Wrap it with Press and Seal plastic wrap.  It can be found in the stores where the plastic wraps or tin foil is kept.               2.  Some people take a bath and hang their leg/foot out of the tub.                        3  Put your leg in a plastic bag and tape it on.           4. You can purchase a shower cover for casts at some pharmacies and through the Internet.            5. Take a Bed Bath or wash up at the sink            We want to hear from you!  In the next few weeks, you should receive a call or email to complete a survey about your visit at [facility]. Please help us improve your appointment experience by letting us know how we did today. We strive to make your experience good and value any ways in which we could do better.      We value your input and suggestions.    Thank you for choosing the [facility]!

## 2022-12-13 ENCOUNTER — OFFICE VISIT (OUTPATIENT)
Dept: VASCULAR SURGERY | Facility: CLINIC | Age: 82
End: 2022-12-13
Attending: NURSE PRACTITIONER
Payer: MEDICARE

## 2022-12-13 VITALS
OXYGEN SATURATION: 98 % | HEART RATE: 75 BPM | RESPIRATION RATE: 12 BRPM | TEMPERATURE: 97.8 F | SYSTOLIC BLOOD PRESSURE: 190 MMHG | WEIGHT: 162.5 LBS | DIASTOLIC BLOOD PRESSURE: 94 MMHG | BODY MASS INDEX: 23.32 KG/M2

## 2022-12-13 DIAGNOSIS — R60.0 VENOUS STASIS ULCER OF RIGHT LOWER LEG WITH EDEMA OF RIGHT LOWER LEG (H): ICD-10-CM

## 2022-12-13 DIAGNOSIS — L97.919 VENOUS STASIS ULCER OF RIGHT LOWER LEG WITH EDEMA OF RIGHT LOWER LEG (H): ICD-10-CM

## 2022-12-13 DIAGNOSIS — I83.891 VENOUS STASIS ULCER OF RIGHT LOWER LEG WITH EDEMA OF RIGHT LOWER LEG (H): ICD-10-CM

## 2022-12-13 DIAGNOSIS — I83.892 VENOUS STASIS ULCER OF LEFT LOWER LEG WITH EDEMA OF LEFT LOWER LEG (H): Primary | ICD-10-CM

## 2022-12-13 DIAGNOSIS — I87.2 VENOUS (PERIPHERAL) INSUFFICIENCY: ICD-10-CM

## 2022-12-13 DIAGNOSIS — R60.0 EDEMA OF LOWER EXTREMITY: ICD-10-CM

## 2022-12-13 DIAGNOSIS — L97.929 VENOUS STASIS ULCER OF LEFT LOWER LEG WITH EDEMA OF LEFT LOWER LEG (H): Primary | ICD-10-CM

## 2022-12-13 DIAGNOSIS — I83.019 VENOUS STASIS ULCER OF RIGHT LOWER LEG WITH EDEMA OF RIGHT LOWER LEG (H): ICD-10-CM

## 2022-12-13 DIAGNOSIS — R60.0 VENOUS STASIS ULCER OF LEFT LOWER LEG WITH EDEMA OF LEFT LOWER LEG (H): Primary | ICD-10-CM

## 2022-12-13 DIAGNOSIS — R41.3 MEMORY IMPAIRMENT: ICD-10-CM

## 2022-12-13 DIAGNOSIS — I83.029 VENOUS STASIS ULCER OF LEFT LOWER LEG WITH EDEMA OF LEFT LOWER LEG (H): Primary | ICD-10-CM

## 2022-12-13 PROCEDURE — 99213 OFFICE O/P EST LOW 20 MIN: CPT | Performed by: NURSE PRACTITIONER

## 2022-12-13 PROCEDURE — G0463 HOSPITAL OUTPT CLINIC VISIT: HCPCS | Performed by: NURSE PRACTITIONER

## 2022-12-13 ASSESSMENT — PAIN SCALES - GENERAL: PAINLEVEL: NO PAIN (0)

## 2022-12-13 NOTE — PROGRESS NOTES
Follow up Vascular Visit       Date of Service:12/13/22      Chief Complaint: BLE swelling and ulcerations      Pt returns to [facility] with regards to their BLE swelling and ulcers.  They arrive today alone. They are currently using ABD and rolled gauze to the wounds. This is being done by staff at our clinic weekly. He does not like this; felt the rolled gauze was too tight. They are using tubular compression bilaterally for compression. He does not like this; he refused to use the 2 layers any longer.  They are feeling well today. Denies fevers, chills. No shortness of breath.     Allergies:   Allergies   Allergen Reactions     Cats Itching     Dust Mites Itching     Mold Itching     Levaquin [Levofloxacin]      Ankle swelling       Medications:   Current Outpatient Medications:      ADVAIR -21 MCG/ACT inhaler, INHALE 2 PUFFS BY MOUTH TWICE DAILY, Disp: , Rfl:      albuterol (PROAIR HFA/PROVENTIL HFA/VENTOLIN HFA) 108 (90 Base) MCG/ACT inhaler, Inhale 2 puffs into the lungs as needed for shortness of breath / dyspnea or wheezing, Disp: , Rfl:      ascorbic acid 1000 MG TABS tablet, Take 1,000 mg by mouth, Disp: , Rfl:      aspirin (ASA) 81 MG EC tablet, Take 81 mg by mouth daily, Disp: , Rfl:      BREZTRI AEROSPHERE 160-9-4.8 MCG/ACT AERO, INHALE 2 PUFFS BY MOUTH TWICE DAILY, Disp: , Rfl:      calcium carbonate-vitamin D (OS-JOSUE WITH D) 500-200 MG-UNIT tablet, Take 1 tablet by mouth, Disp: , Rfl:      cholecalciferol (DIALYVITE VITAMIN D 5000) 125 MCG (5000 UT) CAPS, Take 2 capsules (10,000 Units) by mouth daily, Disp: 90 capsule, Rfl: 3     donepezil (ARICEPT) 5 MG tablet, TAKE 1 TABLET(5 MG) BY MOUTH EVERY DAY, Disp: , Rfl:      ferrous sulfate (FEROSUL) 325 (65 Fe) MG tablet, Take 325 mg by mouth daily (with breakfast), Disp: , Rfl:      ketoconazole (NIZORAL A-D) 1 % shampoo, Externally apply topically as needed, Disp: , Rfl:      levothyroxine (SYNTHROID/LEVOTHROID) 100  MCG tablet, Take 100 mcg by mouth daily, Disp: , Rfl:      losartan (COZAAR) 100 MG tablet, Take 100 mg by mouth daily, Disp: , Rfl:      losartan (COZAAR) 25 MG tablet, , Disp: , Rfl:      multivitamin w/minerals (THERA-VIT-M) tablet, Take 1 tablet by mouth, Disp: , Rfl:      vitamin C (ASCORBIC ACID) 1000 MG TABS, Take 1 tablet (1,000 mg) by mouth 2 times daily, Disp: 180 tablet, Rfl: 3    Current Facility-Administered Medications:      lidocaine (XYLOCAINE) 2 % external gel, , Topical, Daily PRN, Deborah Green NP, 5 mL at 11/02/22 0948    History:   Past Medical History:   Diagnosis Date     Asthma      COPD (chronic obstructive pulmonary disease) (H)      Essential thrombocytopenia (H)      Graves disease      Hypertension      Hypothyroidism      Lumbar disc herniation      Testosterone deficiency        Physical Exam:    BP (!) 190/94   Pulse 75   Temp 97.8  F (36.6  C)   Resp 12   Wt 162 lb 8 oz (73.7 kg)   SpO2 98%   BMI 23.32 kg/m      General:  Patient presents to clinic in no apparent distress.  Head: normocephalic atraumatic  Psychiatric:  Alert and oriented x3.   Respiratory: unlabored breathing; no cough  Integumentary:  Skin is uniformly warm, dry and pink.    Extremities: essentially healed; intact skin; thin eschar on the left lateral leg; no weeping; no erythema; small bruise to left shin    Wound (used by OP [facility] only) 03/03/21 1237 Right (Active)   Number of days: 650       VASC Wound Lt lateral ankle (Active)   Pre Size Length 0 09/15/22 0900   Pre Size Width 0 09/15/22 0900   Pre Size Depth 0 09/15/22 0900   Pre Total Sq cm 0 09/15/22 0900   Product Used Alginate 08/30/22 1000   Number of days: 134       VASC Wound Lt lateral leg (Active)   Pre Size Length 0.6 12/07/22 0900   Pre Size Width 0.3 12/07/22 0900   Pre Size Depth 0.1 12/07/22 0900   Pre Total Sq cm 0.18 12/07/22 0900   Post Size Length 7.5 09/07/22 0900   Post Size Width 3 09/07/22 0900   Post Size Depth 0.1 09/07/22 0900    Post Total Sq cm 22.5 09/07/22 0900   Description scabbed 12/13/22 0900   Product Used Collagen 10/25/22 1500   Number of days: 134       VASC Wound Lt lower shin (Active)   Pre Size Length 1.4 12/01/22 1000   Pre Size Width 1 12/01/22 1000   Pre Size Depth 0.1 12/01/22 1000   Pre Total Sq cm 9 11/23/22 0800   Post Size Length 0.5 11/02/22 0900   Post Size Width 0.5 11/02/22 0900   Post Size Depth 0.1 11/02/22 0900   Post Total Sq cm 0.25 11/02/22 0900   Description healed 11/16/22 1100   Product Used Collagen 10/25/22 1500   Number of days: 134       VASC Wound Rt lateral leg (Active)   Pre Size Length 0.3 10/25/22 1500   Pre Size Width 0.3 10/25/22 1500   Pre Size Depth 0.1 10/25/22 1500   Pre Total Sq cm 0.09 10/25/22 1500   Post Size Length 2.4 09/07/22 0900   Post Size Width 0.3 09/07/22 0900   Post Size Depth 0.1 09/07/22 0900   Post Total Sq cm 0.72 09/07/22 0900   Description SCAB 11/23/22 0800   Product Used Collagen 10/25/22 1500   Number of days: 134       Incision/Surgical Site 11/04/20 Left Lower Eyelid (Active)   Number of days: 769       Incision/Surgical Site 07/09/22 Abdomen (Active)   Number of days: 157       Incision/Surgical Site 07/09/22 Bilateral Leg (Active)   Number of days: 157            Circumferential volume measures:      Circumferential Measures 10/18/2022 10/25/2022 11/9/2022 11/16/2022 12/13/2022   Right just above MTP 23 23 23 24.4 23.3   Right Ankle 24.5 24 24.2 23.0 23.1   Right Widest Calf 35 36 35.2 34.6 35.9   Left - just above MTP 23 23 23 23.6 24.2   Left Ankle 24 24 24.2 22.4 22.5   Left Widest Calf 34.5 38 37.6 33.6 33.4       Labs:    I personally reviewed the following lab results today and those on care everywhere    CRP   Date Value Ref Range Status   07/08/2022 5.5 (H) 0.0 - <0.8 mg/dL Final      No results found for: SED   Last Renal Panel:  Sodium   Date Value Ref Range Status   07/10/2022 143 136 - 145 mmol/L Final     Potassium   Date Value Ref Range Status    07/10/2022 4.3 3.5 - 5.0 mmol/L Final     Chloride   Date Value Ref Range Status   07/10/2022 110 (H) 98 - 107 mmol/L Final     Carbon Dioxide (CO2)   Date Value Ref Range Status   07/10/2022 24 22 - 31 mmol/L Final     Anion Gap   Date Value Ref Range Status   07/10/2022 9 5 - 18 mmol/L Final     Glucose   Date Value Ref Range Status   07/10/2022 93 70 - 125 mg/dL Final     GLUCOSE BY METER POCT   Date Value Ref Range Status   07/10/2022 79 70 - 99 mg/dL Final     Urea Nitrogen   Date Value Ref Range Status   07/10/2022 45 (H) 8 - 28 mg/dL Final     Creatinine   Date Value Ref Range Status   07/10/2022 1.81 (H) 0.70 - 1.30 mg/dL Final     GFR Estimate   Date Value Ref Range Status   07/10/2022 37 (L) >60 mL/min/1.73m2 Final     Comment:     Effective December 21, 2021 eGFRcr in adults is calculated using the 2021 CKD-EPI creatinine equation which includes age and gender (Michell et al., NEJ, DOI: 10.1056/TCKHmo0871704)     Calcium   Date Value Ref Range Status   07/10/2022 7.9 (L) 8.5 - 10.5 mg/dL Final     Phosphorus   Date Value Ref Range Status   07/08/2022 5.6 (H) 2.5 - 4.5 mg/dL Final     Albumin   Date Value Ref Range Status   07/09/2022 3.0 (L) 3.5 - 5.0 g/dL Final      Lab Results   Component Value Date    WBC 10.9 07/10/2022     Lab Results   Component Value Date    RBC 3.25 07/10/2022     Lab Results   Component Value Date    HGB 10.2 07/10/2022     Lab Results   Component Value Date    HCT 33.6 07/10/2022     No components found for: MCT  Lab Results   Component Value Date     07/10/2022     Lab Results   Component Value Date    MCH 31.4 07/10/2022     Lab Results   Component Value Date    MCHC 30.4 07/10/2022     Lab Results   Component Value Date    RDW 13.6 07/10/2022     Lab Results   Component Value Date     07/10/2022      No results found for: A1C   TSH   Date Value Ref Range Status   07/08/2022 0.49 0.30 - 5.00 uIU/mL Final      No results found for: VITDT         "        Impression:  Encounter Diagnoses   Name Primary?     Venous stasis ulcer of left lower leg with edema of left lower leg (H) Yes     Venous stasis ulcer of right lower leg with edema of right lower leg (H)      Venous (peripheral) insufficiency      Edema of lower extremity      Memory impairment                            Are any of these wounds new today: No; Location: na    Assessment/Plan:          1. Debridement: na     2.  Wound treatment: wound treatment will include irrigation and dressings to promote autolytic debridement which will include:wounds are essentially healed; pt refused a protective dressing today.             3. Edema: we have discussed at length his need for life long compression; he does not want to do this; he would only allow a 6\" piece of tubular compression be applied to the left leg only. He was previously sized for velcro wraps; he has lost or thrown these away. His family knows that he needs life long compression; they do not live in [place]. The compression wraps were applied today in clinic. He most likely will reopen his wounds due to uncontrolled edema. He has been reported as a vulnerable adult to the [facility]. His pcp has also been notified in the past.            4. Nutrition: low sodium diet           5. Offloading: na     Patient will follow up with me PRN weeks for reevaluation. They were instructed to call the clinic sooner with any signs or symptoms of infection or any further questions/concerns. Answered all questions.          Deborah Green DNP, RN, CNP, CWOCN, CFCN, CLT  [facility]   [phone]        This note was electronically signed by Deborah Green NP  "

## 2022-12-13 NOTE — PATIENT INSTRUCTIONS
You declined to have protective cover dressing applied today    You declined to have adequate compression applied to your legs today    Continue to wear your compression stockings or velcro wraps every day; put them on first thing in the morning and remove at bedtime    Replace your compression stockings every 3-4 months; these garments will lose their elasticity and become ineffective    Replace velcro wraps every 1-2 years    Elevate your legs periodically throughout the day, 30-60 minutes 1-3 times per day    Apply lotion to your legs 1-2 times per day; some good name brands are Cetaphil, Sarna, Aveeno, VaniCream, CeraVe    Continue to walk and exercise    If you are taking a diuretic continue to do so at the direction of your primary care provider    Make an appointment at the vascular clinic again if you have worsening swelling, need a prescription for new compression garments; and/or develop new wounds

## 2023-12-07 ENCOUNTER — LAB REQUISITION (OUTPATIENT)
Dept: LAB | Facility: CLINIC | Age: 83
End: 2023-12-07
Payer: MEDICARE

## 2023-12-07 DIAGNOSIS — L97.329: ICD-10-CM

## 2023-12-07 PROCEDURE — 87102 FUNGUS ISOLATION CULTURE: CPT | Mod: ORL | Performed by: NURSE PRACTITIONER

## 2023-12-07 PROCEDURE — 87075 CULTR BACTERIA EXCEPT BLOOD: CPT | Mod: ORL | Performed by: NURSE PRACTITIONER

## 2023-12-07 PROCEDURE — 87186 SC STD MICRODIL/AGAR DIL: CPT | Mod: ORL | Performed by: NURSE PRACTITIONER

## 2023-12-10 LAB
BACTERIA WND CULT: ABNORMAL

## 2023-12-14 LAB — BACTERIA WND CULT: NORMAL

## 2024-01-04 LAB — BACTERIA WND CULT: NO GROWTH

## 2024-02-09 ENCOUNTER — LAB REQUISITION (OUTPATIENT)
Dept: LAB | Facility: CLINIC | Age: 84
End: 2024-02-09
Payer: MEDICARE

## 2024-02-09 DIAGNOSIS — N18.31 CHRONIC KIDNEY DISEASE, STAGE 3A (H): ICD-10-CM

## 2024-02-09 DIAGNOSIS — R79.89 OTHER SPECIFIED ABNORMAL FINDINGS OF BLOOD CHEMISTRY: ICD-10-CM

## 2024-02-15 LAB
ANION GAP SERPL CALCULATED.3IONS-SCNC: 11 MMOL/L (ref 7–15)
BUN SERPL-MCNC: 31 MG/DL (ref 8–23)
CALCIUM SERPL-MCNC: 9.5 MG/DL (ref 8.8–10.2)
CHLORIDE SERPL-SCNC: 101 MMOL/L (ref 98–107)
CREAT SERPL-MCNC: 1.66 MG/DL (ref 0.67–1.17)
DEPRECATED HCO3 PLAS-SCNC: 27 MMOL/L (ref 22–29)
EGFRCR SERPLBLD CKD-EPI 2021: 41 ML/MIN/1.73M2
ERYTHROCYTE [DISTWIDTH] IN BLOOD BY AUTOMATED COUNT: 14.4 % (ref 10–15)
GLUCOSE SERPL-MCNC: 80 MG/DL (ref 70–99)
HCT VFR BLD AUTO: 37.1 % (ref 40–53)
HGB BLD-MCNC: 11.4 G/DL (ref 13.3–17.7)
MCH RBC QN AUTO: 32 PG (ref 26.5–33)
MCHC RBC AUTO-ENTMCNC: 30.7 G/DL (ref 31.5–36.5)
MCV RBC AUTO: 104 FL (ref 78–100)
PLATELET # BLD AUTO: 841 10E3/UL (ref 150–450)
POTASSIUM SERPL-SCNC: 4.9 MMOL/L (ref 3.4–5.3)
RBC # BLD AUTO: 3.56 10E6/UL (ref 4.4–5.9)
SODIUM SERPL-SCNC: 139 MMOL/L (ref 135–145)
WBC # BLD AUTO: 9.5 10E3/UL (ref 4–11)

## 2024-02-15 PROCEDURE — 36415 COLL VENOUS BLD VENIPUNCTURE: CPT | Mod: ORL | Performed by: NURSE PRACTITIONER

## 2024-02-15 PROCEDURE — P9604 ONE-WAY ALLOW PRORATED TRIP: HCPCS | Mod: ORL | Performed by: NURSE PRACTITIONER

## 2024-02-15 PROCEDURE — 85027 COMPLETE CBC AUTOMATED: CPT | Mod: ORL | Performed by: NURSE PRACTITIONER

## 2024-02-15 PROCEDURE — 80048 BASIC METABOLIC PNL TOTAL CA: CPT | Mod: ORL | Performed by: NURSE PRACTITIONER

## 2024-06-20 ENCOUNTER — LAB REQUISITION (OUTPATIENT)
Dept: LAB | Facility: CLINIC | Age: 84
End: 2024-06-20
Payer: MEDICARE

## 2024-06-20 DIAGNOSIS — N18.30 CHRONIC KIDNEY DISEASE, STAGE 3 UNSPECIFIED (H): ICD-10-CM

## 2024-06-20 DIAGNOSIS — D75.839 THROMBOCYTOSIS, UNSPECIFIED: ICD-10-CM

## 2024-06-22 ENCOUNTER — LAB REQUISITION (OUTPATIENT)
Dept: LAB | Facility: CLINIC | Age: 84
End: 2024-06-22
Payer: MEDICARE

## 2024-06-22 DIAGNOSIS — N18.30 CHRONIC KIDNEY DISEASE, STAGE 3 UNSPECIFIED (H): ICD-10-CM

## 2024-06-22 DIAGNOSIS — D75.839 THROMBOCYTOSIS, UNSPECIFIED: ICD-10-CM

## 2024-06-24 ENCOUNTER — LAB REQUISITION (OUTPATIENT)
Dept: LAB | Facility: CLINIC | Age: 84
End: 2024-06-24
Payer: MEDICARE

## 2024-06-24 DIAGNOSIS — E03.8 OTHER SPECIFIED HYPOTHYROIDISM: ICD-10-CM

## 2024-06-24 LAB
ANION GAP SERPL CALCULATED.3IONS-SCNC: 11 MMOL/L (ref 7–15)
BASOPHILS # BLD AUTO: 0.1 10E3/UL (ref 0–0.2)
BASOPHILS NFR BLD AUTO: 1 %
BUN SERPL-MCNC: 33.7 MG/DL (ref 8–23)
CALCIUM SERPL-MCNC: 10.2 MG/DL (ref 8.8–10.2)
CHLORIDE SERPL-SCNC: 101 MMOL/L (ref 98–107)
CREAT SERPL-MCNC: 2.07 MG/DL (ref 0.67–1.17)
DEPRECATED HCO3 PLAS-SCNC: 27 MMOL/L (ref 22–29)
EGFRCR SERPLBLD CKD-EPI 2021: 31 ML/MIN/1.73M2
EOSINOPHIL # BLD AUTO: 0.4 10E3/UL (ref 0–0.7)
EOSINOPHIL NFR BLD AUTO: 5 %
ERYTHROCYTE [DISTWIDTH] IN BLOOD BY AUTOMATED COUNT: 15.9 % (ref 10–15)
GLUCOSE SERPL-MCNC: 105 MG/DL (ref 70–99)
HCT VFR BLD AUTO: 33.8 % (ref 40–53)
HGB BLD-MCNC: 10.3 G/DL (ref 13.3–17.7)
HOLD SPECIMEN: NORMAL
IMM GRANULOCYTES # BLD: 0.1 10E3/UL
IMM GRANULOCYTES NFR BLD: 1 %
LYMPHOCYTES # BLD AUTO: 0.8 10E3/UL (ref 0.8–5.3)
LYMPHOCYTES NFR BLD AUTO: 8 %
MCH RBC QN AUTO: 31.6 PG (ref 26.5–33)
MCHC RBC AUTO-ENTMCNC: 30.5 G/DL (ref 31.5–36.5)
MCV RBC AUTO: 104 FL (ref 78–100)
MONOCYTES # BLD AUTO: 1 10E3/UL (ref 0–1.3)
MONOCYTES NFR BLD AUTO: 10 %
NEUTROPHILS # BLD AUTO: 7.1 10E3/UL (ref 1.6–8.3)
NEUTROPHILS NFR BLD AUTO: 75 %
NRBC # BLD AUTO: 0 10E3/UL
NRBC BLD AUTO-RTO: 0 /100
PLATELET # BLD AUTO: 824 10E3/UL (ref 150–450)
POTASSIUM SERPL-SCNC: 5 MMOL/L (ref 3.4–5.3)
RBC # BLD AUTO: 3.26 10E6/UL (ref 4.4–5.9)
SODIUM SERPL-SCNC: 139 MMOL/L (ref 135–145)
TSH SERPL DL<=0.005 MIU/L-ACNC: 48.9 UIU/ML (ref 0.3–4.2)
WBC # BLD AUTO: 9.5 10E3/UL (ref 4–11)

## 2024-06-24 PROCEDURE — 36415 COLL VENOUS BLD VENIPUNCTURE: CPT | Mod: ORL | Performed by: NURSE PRACTITIONER

## 2024-06-24 PROCEDURE — 85025 COMPLETE CBC W/AUTO DIFF WBC: CPT | Mod: ORL | Performed by: NURSE PRACTITIONER

## 2024-06-24 PROCEDURE — 80048 BASIC METABOLIC PNL TOTAL CA: CPT | Mod: ORL | Performed by: NURSE PRACTITIONER

## 2024-06-24 PROCEDURE — P9604 ONE-WAY ALLOW PRORATED TRIP: HCPCS | Mod: ORL | Performed by: NURSE PRACTITIONER

## 2024-06-24 PROCEDURE — 84443 ASSAY THYROID STIM HORMONE: CPT | Mod: ORL | Performed by: NURSE PRACTITIONER

## 2024-10-17 ENCOUNTER — LAB REQUISITION (OUTPATIENT)
Dept: LAB | Facility: CLINIC | Age: 84
End: 2024-10-17
Payer: MEDICARE

## 2024-10-17 DIAGNOSIS — E03.9 HYPOTHYROIDISM, UNSPECIFIED: ICD-10-CM

## 2024-10-17 DIAGNOSIS — D75.839 THROMBOCYTOSIS, UNSPECIFIED: ICD-10-CM

## 2024-10-21 LAB
BASOPHILS # BLD AUTO: 0.1 10E3/UL (ref 0–0.2)
BASOPHILS NFR BLD AUTO: 1 %
EOSINOPHIL # BLD AUTO: 0.5 10E3/UL (ref 0–0.7)
EOSINOPHIL NFR BLD AUTO: 6 %
ERYTHROCYTE [DISTWIDTH] IN BLOOD BY AUTOMATED COUNT: 15.9 % (ref 10–15)
HCT VFR BLD AUTO: 40.5 % (ref 40–53)
HGB BLD-MCNC: 12.1 G/DL (ref 13.3–17.7)
IMM GRANULOCYTES # BLD: 0.1 10E3/UL
IMM GRANULOCYTES NFR BLD: 1 %
LYMPHOCYTES # BLD AUTO: 1 10E3/UL (ref 0.8–5.3)
LYMPHOCYTES NFR BLD AUTO: 11 %
MCH RBC QN AUTO: 31.3 PG (ref 26.5–33)
MCHC RBC AUTO-ENTMCNC: 29.9 G/DL (ref 31.5–36.5)
MCV RBC AUTO: 105 FL (ref 78–100)
MONOCYTES # BLD AUTO: 1.1 10E3/UL (ref 0–1.3)
MONOCYTES NFR BLD AUTO: 12 %
NEUTROPHILS # BLD AUTO: 6.5 10E3/UL (ref 1.6–8.3)
NEUTROPHILS NFR BLD AUTO: 70 %
NRBC # BLD AUTO: 0 10E3/UL
NRBC BLD AUTO-RTO: 0 /100
PLATELET # BLD AUTO: 945 10E3/UL (ref 150–450)
RBC # BLD AUTO: 3.87 10E6/UL (ref 4.4–5.9)
TSH SERPL DL<=0.005 MIU/L-ACNC: 68.6 UIU/ML (ref 0.3–4.2)
WBC # BLD AUTO: 9.3 10E3/UL (ref 4–11)

## 2024-10-21 PROCEDURE — 36415 COLL VENOUS BLD VENIPUNCTURE: CPT | Mod: ORL | Performed by: NURSE PRACTITIONER

## 2024-10-21 PROCEDURE — 85025 COMPLETE CBC W/AUTO DIFF WBC: CPT | Mod: ORL | Performed by: NURSE PRACTITIONER

## 2024-10-21 PROCEDURE — P9604 ONE-WAY ALLOW PRORATED TRIP: HCPCS | Mod: ORL | Performed by: NURSE PRACTITIONER

## 2024-10-21 PROCEDURE — 84443 ASSAY THYROID STIM HORMONE: CPT | Mod: ORL | Performed by: NURSE PRACTITIONER

## 2024-11-27 ENCOUNTER — LAB REQUISITION (OUTPATIENT)
Dept: LAB | Facility: CLINIC | Age: 84
End: 2024-11-27
Payer: MEDICARE

## 2024-11-27 DIAGNOSIS — E03.9 HYPOTHYROIDISM, UNSPECIFIED: ICD-10-CM

## 2024-12-02 LAB — TSH SERPL DL<=0.005 MIU/L-ACNC: 25.1 UIU/ML (ref 0.3–4.2)

## 2024-12-02 PROCEDURE — 84443 ASSAY THYROID STIM HORMONE: CPT | Mod: ORL | Performed by: NURSE PRACTITIONER

## 2024-12-02 PROCEDURE — P9604 ONE-WAY ALLOW PRORATED TRIP: HCPCS | Mod: ORL | Performed by: NURSE PRACTITIONER

## 2024-12-02 PROCEDURE — 36415 COLL VENOUS BLD VENIPUNCTURE: CPT | Mod: ORL | Performed by: NURSE PRACTITIONER

## 2025-01-03 ENCOUNTER — LAB REQUISITION (OUTPATIENT)
Dept: LAB | Facility: CLINIC | Age: 85
End: 2025-01-03
Payer: MEDICARE

## 2025-01-03 DIAGNOSIS — E03.9 HYPOTHYROIDISM, UNSPECIFIED: ICD-10-CM

## 2025-01-06 LAB — TSH SERPL DL<=0.005 MIU/L-ACNC: 8.33 UIU/ML (ref 0.3–4.2)

## 2025-01-06 PROCEDURE — 84443 ASSAY THYROID STIM HORMONE: CPT | Mod: ORL | Performed by: NURSE PRACTITIONER

## 2025-01-06 PROCEDURE — P9604 ONE-WAY ALLOW PRORATED TRIP: HCPCS | Mod: ORL | Performed by: NURSE PRACTITIONER

## 2025-01-06 PROCEDURE — 36415 COLL VENOUS BLD VENIPUNCTURE: CPT | Mod: ORL | Performed by: NURSE PRACTITIONER

## 2025-01-17 ENCOUNTER — LAB REQUISITION (OUTPATIENT)
Dept: LAB | Facility: CLINIC | Age: 85
End: 2025-01-17
Payer: MEDICARE

## 2025-01-17 DIAGNOSIS — D75.839 THROMBOCYTOSIS, UNSPECIFIED: ICD-10-CM

## 2025-01-20 LAB
BASOPHILS # BLD AUTO: 0.1 10E3/UL (ref 0–0.2)
BASOPHILS NFR BLD AUTO: 1 %
EOSINOPHIL # BLD AUTO: 0.6 10E3/UL (ref 0–0.7)
EOSINOPHIL NFR BLD AUTO: 5 %
ERYTHROCYTE [DISTWIDTH] IN BLOOD BY AUTOMATED COUNT: 15.1 % (ref 10–15)
HCT VFR BLD AUTO: 30.8 % (ref 40–53)
HGB BLD-MCNC: 9.1 G/DL (ref 13.3–17.7)
IMM GRANULOCYTES # BLD: 0.1 10E3/UL
IMM GRANULOCYTES NFR BLD: 1 %
LYMPHOCYTES # BLD AUTO: 0.9 10E3/UL (ref 0.8–5.3)
LYMPHOCYTES NFR BLD AUTO: 7 %
MCH RBC QN AUTO: 31.6 PG (ref 26.5–33)
MCHC RBC AUTO-ENTMCNC: 29.5 G/DL (ref 31.5–36.5)
MCV RBC AUTO: 107 FL (ref 78–100)
MONOCYTES # BLD AUTO: 1.3 10E3/UL (ref 0–1.3)
MONOCYTES NFR BLD AUTO: 11 %
NEUTROPHILS # BLD AUTO: 9.3 10E3/UL (ref 1.6–8.3)
NEUTROPHILS NFR BLD AUTO: 76 %
NRBC # BLD AUTO: 0 10E3/UL
NRBC BLD AUTO-RTO: 0 /100
PLAT MORPH BLD: ABNORMAL
PLATELET # BLD AUTO: 1247 10E3/UL (ref 150–450)
POLYCHROMASIA BLD QL SMEAR: SLIGHT
RBC # BLD AUTO: 2.88 10E6/UL (ref 4.4–5.9)
RBC MORPH BLD: ABNORMAL
WBC # BLD AUTO: 12.3 10E3/UL (ref 4–11)

## 2025-01-20 PROCEDURE — P9604 ONE-WAY ALLOW PRORATED TRIP: HCPCS | Mod: ORL | Performed by: NURSE PRACTITIONER

## 2025-01-20 PROCEDURE — 36415 COLL VENOUS BLD VENIPUNCTURE: CPT | Mod: ORL | Performed by: NURSE PRACTITIONER

## 2025-01-20 PROCEDURE — 85025 COMPLETE CBC W/AUTO DIFF WBC: CPT | Mod: ORL | Performed by: NURSE PRACTITIONER

## 2025-01-31 ENCOUNTER — LAB REQUISITION (OUTPATIENT)
Dept: LAB | Facility: CLINIC | Age: 85
End: 2025-01-31
Payer: MEDICARE

## 2025-01-31 DIAGNOSIS — D47.3 ESSENTIAL (HEMORRHAGIC) THROMBOCYTHEMIA (H): ICD-10-CM

## 2025-02-03 LAB
BASOPHILS # BLD AUTO: 0.1 10E3/UL (ref 0–0.2)
BASOPHILS NFR BLD AUTO: 1 %
EOSINOPHIL # BLD AUTO: 0 10E3/UL (ref 0–0.7)
EOSINOPHIL NFR BLD AUTO: 0 %
ERYTHROCYTE [DISTWIDTH] IN BLOOD BY AUTOMATED COUNT: 15.6 % (ref 10–15)
HCT VFR BLD AUTO: 29.9 % (ref 40–53)
HGB BLD-MCNC: 9.1 G/DL (ref 13.3–17.7)
IMM GRANULOCYTES # BLD: 0.1 10E3/UL
IMM GRANULOCYTES NFR BLD: 1 %
LYMPHOCYTES # BLD AUTO: 0.6 10E3/UL (ref 0.8–5.3)
LYMPHOCYTES NFR BLD AUTO: 7 %
MCH RBC QN AUTO: 32 PG (ref 26.5–33)
MCHC RBC AUTO-ENTMCNC: 30.4 G/DL (ref 31.5–36.5)
MCV RBC AUTO: 105 FL (ref 78–100)
MONOCYTES # BLD AUTO: 1.1 10E3/UL (ref 0–1.3)
MONOCYTES NFR BLD AUTO: 13 %
NEUTROPHILS # BLD AUTO: 6.8 10E3/UL (ref 1.6–8.3)
NEUTROPHILS NFR BLD AUTO: 79 %
NRBC # BLD AUTO: 0 10E3/UL
NRBC BLD AUTO-RTO: 0 /100
PLATELET # BLD AUTO: 746 10E3/UL (ref 150–450)
RBC # BLD AUTO: 2.84 10E6/UL (ref 4.4–5.9)
WBC # BLD AUTO: 8.6 10E3/UL (ref 4–11)

## 2025-02-03 PROCEDURE — P9604 ONE-WAY ALLOW PRORATED TRIP: HCPCS | Mod: ORL | Performed by: INTERNAL MEDICINE

## 2025-02-03 PROCEDURE — 36415 COLL VENOUS BLD VENIPUNCTURE: CPT | Mod: ORL | Performed by: INTERNAL MEDICINE

## 2025-02-03 PROCEDURE — 85025 COMPLETE CBC W/AUTO DIFF WBC: CPT | Mod: ORL | Performed by: INTERNAL MEDICINE

## 2025-02-06 ENCOUNTER — LAB REQUISITION (OUTPATIENT)
Dept: LAB | Facility: CLINIC | Age: 85
End: 2025-02-06
Payer: MEDICARE

## 2025-02-06 DIAGNOSIS — I10 ESSENTIAL (PRIMARY) HYPERTENSION: ICD-10-CM

## 2025-02-10 LAB
ANION GAP SERPL CALCULATED.3IONS-SCNC: 14 MMOL/L (ref 7–15)
BUN SERPL-MCNC: 28.3 MG/DL (ref 8–23)
CALCIUM SERPL-MCNC: 9.7 MG/DL (ref 8.8–10.4)
CHLORIDE SERPL-SCNC: 102 MMOL/L (ref 98–107)
CREAT SERPL-MCNC: 1.85 MG/DL (ref 0.67–1.17)
EGFRCR SERPLBLD CKD-EPI 2021: 35 ML/MIN/1.73M2
GLUCOSE SERPL-MCNC: 86 MG/DL (ref 70–99)
HCO3 SERPL-SCNC: 23 MMOL/L (ref 22–29)
POTASSIUM SERPL-SCNC: 5.5 MMOL/L (ref 3.4–5.3)
SODIUM SERPL-SCNC: 139 MMOL/L (ref 135–145)

## 2025-05-01 ENCOUNTER — LAB REQUISITION (OUTPATIENT)
Dept: LAB | Facility: CLINIC | Age: 85
End: 2025-05-01
Payer: MEDICARE

## 2025-05-01 DIAGNOSIS — D47.3 ESSENTIAL (HEMORRHAGIC) THROMBOCYTHEMIA (H): ICD-10-CM

## 2025-05-05 LAB
BASOPHILS # BLD AUTO: 0.1 10E3/UL (ref 0–0.2)
BASOPHILS NFR BLD AUTO: 1 %
EOSINOPHIL # BLD AUTO: 0.5 10E3/UL (ref 0–0.7)
EOSINOPHIL NFR BLD AUTO: 5 %
ERYTHROCYTE [DISTWIDTH] IN BLOOD BY AUTOMATED COUNT: 15.5 % (ref 10–15)
HCT VFR BLD AUTO: 39 % (ref 40–53)
HGB BLD-MCNC: 11.9 G/DL (ref 13.3–17.7)
IMM GRANULOCYTES # BLD: 0.1 10E3/UL
IMM GRANULOCYTES NFR BLD: 1 %
LYMPHOCYTES # BLD AUTO: 0.8 10E3/UL (ref 0.8–5.3)
LYMPHOCYTES NFR BLD AUTO: 8 %
MCH RBC QN AUTO: 30.4 PG (ref 26.5–33)
MCHC RBC AUTO-ENTMCNC: 30.5 G/DL (ref 31.5–36.5)
MCV RBC AUTO: 100 FL (ref 78–100)
MONOCYTES # BLD AUTO: 1.7 10E3/UL (ref 0–1.3)
MONOCYTES NFR BLD AUTO: 17 %
NEUTROPHILS # BLD AUTO: 6.8 10E3/UL (ref 1.6–8.3)
NEUTROPHILS NFR BLD AUTO: 68 %
NRBC # BLD AUTO: 0 10E3/UL
NRBC BLD AUTO-RTO: 0 /100
PLAT MORPH BLD: NORMAL
PLATELET # BLD AUTO: 1101 10E3/UL (ref 150–450)
RBC # BLD AUTO: 3.91 10E6/UL (ref 4.4–5.9)
RBC MORPH BLD: NORMAL
WBC # BLD AUTO: 9.9 10E3/UL (ref 4–11)

## 2025-05-05 PROCEDURE — P9603 ONE-WAY ALLOW PRORATED MILES: HCPCS | Mod: ORL | Performed by: NURSE PRACTITIONER

## 2025-05-05 PROCEDURE — 85025 COMPLETE CBC W/AUTO DIFF WBC: CPT | Mod: ORL | Performed by: NURSE PRACTITIONER

## 2025-05-05 PROCEDURE — 36415 COLL VENOUS BLD VENIPUNCTURE: CPT | Mod: ORL | Performed by: NURSE PRACTITIONER

## 2025-07-30 ENCOUNTER — LAB REQUISITION (OUTPATIENT)
Dept: LAB | Facility: CLINIC | Age: 85
End: 2025-07-30
Payer: MEDICARE

## 2025-07-30 DIAGNOSIS — E03.9 HYPOTHYROIDISM, UNSPECIFIED: ICD-10-CM

## 2025-07-30 DIAGNOSIS — D47.3 ESSENTIAL (HEMORRHAGIC) THROMBOCYTHEMIA (H): ICD-10-CM

## 2025-08-04 LAB
BASOPHILS # BLD AUTO: 0.1 10E3/UL (ref 0–0.2)
BASOPHILS NFR BLD AUTO: 1 %
EOSINOPHIL # BLD AUTO: 0.6 10E3/UL (ref 0–0.7)
EOSINOPHIL NFR BLD AUTO: 6 %
ERYTHROCYTE [DISTWIDTH] IN BLOOD BY AUTOMATED COUNT: 15.3 % (ref 10–15)
HCT VFR BLD AUTO: 39.3 % (ref 40–53)
HGB BLD-MCNC: 11.8 G/DL (ref 13.3–17.7)
IMM GRANULOCYTES # BLD: 0.1 10E3/UL
IMM GRANULOCYTES NFR BLD: 1 %
LYMPHOCYTES # BLD AUTO: 1 10E3/UL (ref 0.8–5.3)
LYMPHOCYTES NFR BLD AUTO: 10 %
MCH RBC QN AUTO: 31.2 PG (ref 26.5–33)
MCHC RBC AUTO-ENTMCNC: 30 G/DL (ref 31.5–36.5)
MCV RBC AUTO: 104 FL (ref 78–100)
MONOCYTES # BLD AUTO: 1.2 10E3/UL (ref 0–1.3)
MONOCYTES NFR BLD AUTO: 12 %
NEUTROPHILS # BLD AUTO: 7.2 10E3/UL (ref 1.6–8.3)
NEUTROPHILS NFR BLD AUTO: 70 %
NRBC # BLD AUTO: 0 10E3/UL
NRBC BLD AUTO-RTO: 0 /100
PLATELET # BLD AUTO: 979 10E3/UL (ref 150–450)
RBC # BLD AUTO: 3.78 10E6/UL (ref 4.4–5.9)
TSH SERPL DL<=0.005 MIU/L-ACNC: 28.6 UIU/ML (ref 0.3–4.2)
WBC # BLD AUTO: 10.3 10E3/UL (ref 4–11)

## 2025-08-04 PROCEDURE — P9604 ONE-WAY ALLOW PRORATED TRIP: HCPCS | Mod: ORL | Performed by: NURSE PRACTITIONER

## 2025-08-04 PROCEDURE — 85025 COMPLETE CBC W/AUTO DIFF WBC: CPT | Mod: ORL | Performed by: NURSE PRACTITIONER

## 2025-08-04 PROCEDURE — 84443 ASSAY THYROID STIM HORMONE: CPT | Mod: ORL | Performed by: NURSE PRACTITIONER

## 2025-08-04 PROCEDURE — 36415 COLL VENOUS BLD VENIPUNCTURE: CPT | Mod: ORL | Performed by: NURSE PRACTITIONER

## (undated) DEVICE — DRSG GAUZE 4X4" TRAY

## (undated) DEVICE — SUTURE PDS 1 54IN TP1+ VIOLET PDP879G

## (undated) DEVICE — BANDAGE ELASTIC VELCRO 4IN REB3014

## (undated) DEVICE — SUCTION MANIFOLD NEPTUNE 2 SYS 1 PORT 702-025-000

## (undated) DEVICE — GLOVE SURG PI ULTRA TOUCH M SZ 6-1/2 LF

## (undated) DEVICE — PREP CHLORAPREP 26ML TINTED HI-LITE ORANGE 930815

## (undated) DEVICE — GLOVE PROTEXIS POWDER FREE SMT 7.5  2D72PT75X

## (undated) DEVICE — GLOVE PROTEXIS MICRO 6.5  2D73PM65

## (undated) DEVICE — GOWN IMPERVIOUS BREATHABLE SMART LG 89015

## (undated) DEVICE — BLADE CLIPPER PIVOT PURPLE DISP 9660

## (undated) DEVICE — DRSG PRIMAPORE 03 1/8X6" 66000318

## (undated) DEVICE — DRAPE OR LAPAROTOMY KC 89228*

## (undated) DEVICE — SU SILK 3-0 SH 30" K832H

## (undated) DEVICE — EYE PREP BETADINE 5% SOLUTION 30ML 0065-0411-30

## (undated) DEVICE — SOL NACL 0.9% IRRIG 1000ML BOTTLE 2F7124

## (undated) DEVICE — SOL WATER IRRIG 1000ML BOTTLE 2F7114

## (undated) DEVICE — SU VICRYL 5-0 S-14DA 18" J571G

## (undated) DEVICE — DRSG STERI STRIP 1/2X4" R1547

## (undated) DEVICE — DRSG KERLIX 4 1/2"X4YDS ROLL 6715

## (undated) DEVICE — SU VICRYL 7-0 TG140-8DA 18" J546G

## (undated) DEVICE — TUBING SUCTION 12"X1/4" N612

## (undated) DEVICE — SUTURE VICRYL+ 2-0 27IN CT-1 UND VCP259H

## (undated) DEVICE — CUSTOM PACK COLON CLOSING SBA5BCCHEA

## (undated) DEVICE — PACK MINOR SINGLE BASIN SSK3001

## (undated) DEVICE — ESU CORD BIPOLAR 12' E0512

## (undated) DEVICE — DRSG TELFA 3X8" 1238

## (undated) DEVICE — PLATE GROUNDING ADULT W/CORD 9165L

## (undated) DEVICE — SU MONOCRYL+ 4-0 18IN PS2 UND MCP496G

## (undated) DEVICE — MITT PRE-OP 7 L X 5 1/2 W 5177M1

## (undated) DEVICE — ESU PENCIL SMOKE EVAC W/ROCKER SWITCH 0703-047-000

## (undated) DEVICE — PACK OCULOPLATIC SEN15OCFSD

## (undated) DEVICE — LINEN TOWEL PACK X5 5464

## (undated) DEVICE — CUSTOM PACK GEN MAJOR SBA5BGMHEA

## (undated) RX ORDER — ONDANSETRON 2 MG/ML
INJECTION INTRAMUSCULAR; INTRAVENOUS
Status: DISPENSED
Start: 2022-07-09

## (undated) RX ORDER — LIDOCAINE HYDROCHLORIDE 10 MG/ML
INJECTION, SOLUTION EPIDURAL; INFILTRATION; INTRACAUDAL; PERINEURAL
Status: DISPENSED
Start: 2022-07-09

## (undated) RX ORDER — BUPIVACAINE HYDROCHLORIDE 2.5 MG/ML
INJECTION, SOLUTION INFILTRATION; PERINEURAL
Status: DISPENSED
Start: 2022-07-09

## (undated) RX ORDER — BACITRACIN ZINC 500 [USP'U]/G
OINTMENT TOPICAL
Status: DISPENSED
Start: 2022-07-09

## (undated) RX ORDER — FENTANYL CITRATE-0.9 % NACL/PF 10 MCG/ML
PLASTIC BAG, INJECTION (ML) INTRAVENOUS
Status: DISPENSED
Start: 2022-07-09

## (undated) RX ORDER — ESMOLOL HYDROCHLORIDE 10 MG/ML
INJECTION INTRAVENOUS
Status: DISPENSED
Start: 2022-07-09

## (undated) RX ORDER — PROPOFOL 10 MG/ML
INJECTION, EMULSION INTRAVENOUS
Status: DISPENSED
Start: 2022-07-09

## (undated) RX ORDER — FENTANYL CITRATE 50 UG/ML
INJECTION, SOLUTION INTRAMUSCULAR; INTRAVENOUS
Status: DISPENSED
Start: 2022-07-09